# Patient Record
Sex: FEMALE | Race: WHITE | Employment: OTHER | URBAN - METROPOLITAN AREA
[De-identification: names, ages, dates, MRNs, and addresses within clinical notes are randomized per-mention and may not be internally consistent; named-entity substitution may affect disease eponyms.]

---

## 2017-03-24 ENCOUNTER — TELEPHONE (OUTPATIENT)
Dept: SURGERY | Facility: CLINIC | Age: 82
End: 2017-03-24

## 2017-03-27 NOTE — TELEPHONE ENCOUNTER
Informed pt that Dr Micheline Balbuena will not be able to refill pts pain pump,Dr Micheline Balbuena does not refill pumps he has not installed, pt understood and no further questions

## 2018-05-15 ENCOUNTER — ASST LIVING (OUTPATIENT)
Dept: FAMILY MEDICINE CLINIC | Facility: CLINIC | Age: 83
End: 2018-05-15

## 2018-05-15 VITALS
HEART RATE: 80 BPM | TEMPERATURE: 98 F | OXYGEN SATURATION: 94 % | SYSTOLIC BLOOD PRESSURE: 150 MMHG | RESPIRATION RATE: 18 BRPM | DIASTOLIC BLOOD PRESSURE: 80 MMHG

## 2018-05-15 DIAGNOSIS — G89.29 CHRONIC MIDLINE BACK PAIN, UNSPECIFIED BACK LOCATION: ICD-10-CM

## 2018-05-15 DIAGNOSIS — M54.9 CHRONIC MIDLINE BACK PAIN, UNSPECIFIED BACK LOCATION: ICD-10-CM

## 2018-05-15 DIAGNOSIS — J45.901 ACUTE EXACERBATION OF COPD WITH ASTHMA (HCC): Primary | ICD-10-CM

## 2018-05-15 DIAGNOSIS — R03.0 ELEVATED BLOOD-PRESSURE READING WITHOUT DIAGNOSIS OF HYPERTENSION: ICD-10-CM

## 2018-05-15 DIAGNOSIS — J44.1 ACUTE EXACERBATION OF COPD WITH ASTHMA (HCC): Primary | ICD-10-CM

## 2018-05-15 DIAGNOSIS — F41.9 ANXIETY: ICD-10-CM

## 2018-05-15 RX ORDER — LEVOFLOXACIN 500 MG/1
500 TABLET, FILM COATED ORAL DAILY
Qty: 10 TABLET | Refills: 0 | Status: SHIPPED | OUTPATIENT
Start: 2018-05-15 | End: 2018-05-25

## 2018-05-15 RX ORDER — BIOTIN 1 MG
1 TABLET ORAL DAILY
COMMUNITY

## 2018-05-15 RX ORDER — UBIDECARENONE 75 MG
250 CAPSULE ORAL DAILY
COMMUNITY
End: 2019-01-07

## 2018-05-15 RX ORDER — OXYBUTYNIN CHLORIDE 5 MG/1
TABLET ORAL
Refills: 2 | COMMUNITY
Start: 2018-04-08 | End: 2018-06-26

## 2018-05-15 RX ORDER — TEMAZEPAM 15 MG/1
CAPSULE ORAL
Refills: 0 | COMMUNITY
Start: 2018-03-09 | End: 2018-06-26

## 2018-05-15 RX ORDER — GABAPENTIN 600 MG/1
600 TABLET ORAL 3 TIMES DAILY
COMMUNITY
End: 2018-06-26

## 2018-05-15 RX ORDER — AMOXICILLIN 500 MG
1200 CAPSULE ORAL DAILY
Status: ON HOLD | COMMUNITY
End: 2019-01-25

## 2018-05-15 RX ORDER — PREDNISONE 20 MG/1
20 TABLET ORAL DAILY
Qty: 5 TABLET | Refills: 0 | Status: SHIPPED | OUTPATIENT
Start: 2018-05-15 | End: 2018-05-20

## 2018-05-15 RX ORDER — TRAVOPROST 0.004 %
DROPS OPHTHALMIC (EYE)
Refills: 3 | COMMUNITY
Start: 2018-05-11 | End: 2019-01-07

## 2018-05-15 RX ORDER — FERROUS SULFATE 325(65) MG
325 TABLET ORAL
COMMUNITY
End: 2019-01-01

## 2018-05-15 RX ORDER — ALBUTEROL SULFATE 90 UG/1
2 AEROSOL, METERED RESPIRATORY (INHALATION) EVERY 4 HOURS PRN
Qty: 1 INHALER | Refills: 6 | Status: SHIPPED | OUTPATIENT
Start: 2018-05-15 | End: 2019-02-22 | Stop reason: ALTCHOICE

## 2018-05-16 ENCOUNTER — TELEPHONE (OUTPATIENT)
Dept: FAMILY MEDICINE CLINIC | Facility: CLINIC | Age: 83
End: 2018-05-16

## 2018-05-16 ENCOUNTER — MED REC SCAN ONLY (OUTPATIENT)
Dept: FAMILY MEDICINE CLINIC | Facility: CLINIC | Age: 83
End: 2018-05-16

## 2018-05-16 DIAGNOSIS — J45.901 ASTHMA WITH COPD WITH EXACERBATION (HCC): Primary | ICD-10-CM

## 2018-05-16 DIAGNOSIS — J44.1 ASTHMA WITH COPD WITH EXACERBATION (HCC): Primary | ICD-10-CM

## 2018-05-16 PROBLEM — J44.9 COPD WITH ASTHMA (HCC): Status: ACTIVE | Noted: 2018-05-16

## 2018-05-16 RX ORDER — IPRATROPIUM BROMIDE AND ALBUTEROL SULFATE 2.5; .5 MG/3ML; MG/3ML
3 SOLUTION RESPIRATORY (INHALATION) EVERY 4 HOURS PRN
Qty: 50 VIAL | Refills: 0 | Status: SHIPPED | OUTPATIENT
Start: 2018-05-16 | End: 2019-01-07

## 2018-05-16 RX ORDER — ALBUTEROL SULFATE 2.5 MG/3ML
2.5 SOLUTION RESPIRATORY (INHALATION) EVERY 4 HOURS PRN
Qty: 540 ML | Refills: 3 | Status: CANCELLED | OUTPATIENT
Start: 2018-05-16

## 2018-05-16 NOTE — TELEPHONE ENCOUNTER
[facility] notified. Recommended Nasal spray and Humidifier for nasal stuffiness.

## 2018-05-16 NOTE — TELEPHONE ENCOUNTER
Yu Finnegan from [facility] called to say that they received the nebulizer machine that was ordered for this patient but not the medication to go inside, as it was not ordered according to pharmacy.  Also wants to know if pt is okay to take mucinex

## 2018-05-16 NOTE — TELEPHONE ENCOUNTER
Shiva Camilo from [facility] asked that Dr Adriana Farias place Rx for Med neb Rx to [facility], and asked if OK for Pt to take Mucinex DM at home for sinuses? Med estevan Rx pended. Will you approve, and advise of Mucinex DM authorized? Routed to Dr Adriana Farias.

## 2018-05-16 NOTE — PROGRESS NOTES
/80   Pulse 80   Temp 97.8 °F (36.6 °C) (Temporal)   Resp 18   SpO2 94%               Patient presents with:  Test Results: go over chest x-ray  Chest Congestion       HPI; Samule Burn is a 80year old female.   She is being seen here for the 325 mg by mouth daily with breakfast. Disp:  Rfl:    Docusate Sodium (STOOL SOFTENER OR) Take by mouth. Disp:  Rfl:    Probiotic Product (PROBIOTIC COLON SUPPORT OR) Take by mouth. Disp:  Rfl:    Polyethylene Glycol 3350 (MIRALAX OR) Take by mouth.  Disp: hypertension  Discussed with the home health nurse to monitor the blood pressure  Anxiety  Stable, continue the temazepam  Chronic midline back pain, unspecified back location  Unchanged, continue Dilaudid pump and OxyContin    Checking her blood work, CBC

## 2018-05-17 ENCOUNTER — TELEPHONE (OUTPATIENT)
Dept: FAMILY MEDICINE CLINIC | Facility: CLINIC | Age: 83
End: 2018-05-17

## 2018-05-17 NOTE — TELEPHONE ENCOUNTER
Initiated Prior authorization for patients Ipratropium-Albuterol  Inhalation solution . Awaiting for results .

## 2018-05-22 ENCOUNTER — ASST LIVING (OUTPATIENT)
Dept: FAMILY MEDICINE CLINIC | Facility: CLINIC | Age: 83
End: 2018-05-22

## 2018-05-22 VITALS
TEMPERATURE: 97 F | SYSTOLIC BLOOD PRESSURE: 140 MMHG | HEART RATE: 82 BPM | OXYGEN SATURATION: 96 % | RESPIRATION RATE: 18 BRPM | DIASTOLIC BLOOD PRESSURE: 68 MMHG

## 2018-05-22 DIAGNOSIS — J44.9 COPD WITH ASTHMA (HCC): ICD-10-CM

## 2018-05-22 DIAGNOSIS — R53.1 GENERALIZED WEAKNESS: ICD-10-CM

## 2018-05-22 DIAGNOSIS — J18.9 PNEUMONITIS: Primary | ICD-10-CM

## 2018-05-22 RX ORDER — PEAK FLOW METER
EACH MISCELLANEOUS
Refills: 0 | COMMUNITY
Start: 2018-05-15 | End: 2019-01-07

## 2018-05-22 RX ORDER — BRIMONIDINE TARTRATE 0.1 %
DROPS OPHTHALMIC (EYE)
Refills: 3 | COMMUNITY
Start: 2018-05-14 | End: 2018-06-26

## 2018-05-24 NOTE — PROGRESS NOTES
/68   Pulse 82   Temp 96.6 °F (35.9 °C) (Temporal)   Resp 18   SpO2 96%               Patient presents with:  RSV: follow up        HPI; Guille Hill is a 80year old female.   Patient is seen for follow-up for cough and congestion  She underw Oral Chew Tab Chew 600 mg by mouth. Disp:  Rfl:    gabapentin 600 MG Oral Tab Take 600 mg by mouth 3 (three) times daily.  Disp:  Rfl:    Ferrous Sulfate 325 (65 Fe) MG Oral Tab Take 325 mg by mouth daily with breakfast. Disp:  Rfl:    Docusate Sodium (STOO indicates understanding of these issues and agrees to the plan. Imaging & Consults:  None  Meds & Refills for this Visit:    No prescriptions requested or ordered in this encounter     No orders of the defined types were placed in this encounter.

## 2018-06-12 ENCOUNTER — ASST LIVING (OUTPATIENT)
Dept: FAMILY MEDICINE CLINIC | Facility: CLINIC | Age: 83
End: 2018-06-12

## 2018-06-12 VITALS
SYSTOLIC BLOOD PRESSURE: 108 MMHG | HEART RATE: 72 BPM | RESPIRATION RATE: 18 BRPM | TEMPERATURE: 97 F | DIASTOLIC BLOOD PRESSURE: 56 MMHG | OXYGEN SATURATION: 94 %

## 2018-06-12 DIAGNOSIS — R03.0 ELEVATED BLOOD PRESSURE READING: Primary | ICD-10-CM

## 2018-06-12 PROCEDURE — 1111F DSCHRG MED/CURRENT MED MERGE: CPT | Performed by: FAMILY MEDICINE

## 2018-06-12 NOTE — PROGRESS NOTES
/56   Pulse 72   Temp 96.8 °F (36 °C) (Temporal)   Resp 18   SpO2 94%               Patient presents with:  Hospital F/U       HPI; Dinah Castellanos is a 80year old female.  She was seen in the central to the hospital with elevated blood pressure puffs into the lungs every 4 (four) hours as needed for Wheezing. Disp: 1 Inhaler Rfl: 6   BusPIRone HCl 10 MG Oral Tab Take 1 tablet (10 mg total) by mouth 2 (two) times daily.  Disp: 60 tablet Rfl: 3   lisinopril 5 MG Oral Tab Take 1 tablet (5 mg total) b HEALTH: feels well otherwise  SKIN: denies any unusual skin lesions or rashes  RESPIRATORY: denies shortness of breath with exertion  CARDIOVASCULAR: denies chest pain on exertion  GI: denies abdominal pain and denies heartburn  NEURO: denies headaches  EX

## 2018-06-25 ENCOUNTER — HOSPITAL ENCOUNTER (EMERGENCY)
Facility: HOSPITAL | Age: 83
Discharge: HOME | End: 2018-06-25
Attending: EMERGENCY MEDICINE
Payer: MEDICARE

## 2018-06-25 VITALS
RESPIRATION RATE: 16 BRPM | WEIGHT: 130 LBS | SYSTOLIC BLOOD PRESSURE: 176 MMHG | BODY MASS INDEX: 21.66 KG/M2 | DIASTOLIC BLOOD PRESSURE: 74 MMHG | HEART RATE: 68 BPM | HEIGHT: 65 IN | TEMPERATURE: 98 F | OXYGEN SATURATION: 96 %

## 2018-06-25 DIAGNOSIS — I10 HYPERTENSION, UNSPECIFIED TYPE: Primary | ICD-10-CM

## 2018-06-25 PROCEDURE — 99284 EMERGENCY DEPT VISIT MOD MDM: CPT

## 2018-06-25 PROCEDURE — 93010 ELECTROCARDIOGRAM REPORT: CPT

## 2018-06-25 PROCEDURE — 93005 ELECTROCARDIOGRAM TRACING: CPT

## 2018-06-25 RX ORDER — CLONIDINE HYDROCHLORIDE 0.1 MG/1
0.1 TABLET ORAL 2 TIMES DAILY
Qty: 30 TABLET | Refills: 0 | Status: SHIPPED | OUTPATIENT
Start: 2018-06-25 | End: 2018-06-26

## 2018-06-25 RX ORDER — CLONIDINE HYDROCHLORIDE 0.1 MG/1
0.1 TABLET ORAL 2 TIMES DAILY
Status: COMPLETED | OUTPATIENT
Start: 2018-06-25 | End: 2018-06-25

## 2018-06-26 ENCOUNTER — ASST LIVING (OUTPATIENT)
Dept: FAMILY MEDICINE CLINIC | Facility: CLINIC | Age: 83
End: 2018-06-26

## 2018-06-26 ENCOUNTER — MOBILE ENCOUNTER (OUTPATIENT)
Dept: FAMILY MEDICINE CLINIC | Facility: CLINIC | Age: 83
End: 2018-06-26

## 2018-06-26 VITALS
TEMPERATURE: 98 F | OXYGEN SATURATION: 95 % | HEART RATE: 76 BPM | SYSTOLIC BLOOD PRESSURE: 142 MMHG | RESPIRATION RATE: 18 BRPM | DIASTOLIC BLOOD PRESSURE: 90 MMHG

## 2018-06-26 DIAGNOSIS — I10 ESSENTIAL HYPERTENSION: Primary | ICD-10-CM

## 2018-06-26 RX ORDER — GABAPENTIN 600 MG/1
TABLET ORAL
COMMUNITY
Start: 2018-06-19 | End: 2019-01-07

## 2018-06-26 RX ORDER — TEMAZEPAM 15 MG/1
CAPSULE ORAL
COMMUNITY
Start: 2018-03-09 | End: 2019-01-07

## 2018-06-26 RX ORDER — HYDROMORPHONE HYDROCHLORIDE 4 MG/1
4 TABLET ORAL 4 TIMES DAILY PRN
Status: ON HOLD | COMMUNITY
Start: 2018-08-10 | End: 2019-01-07

## 2018-06-26 RX ORDER — NALOXONE HYDROCHLORIDE 2 MG/.4ML
2 INJECTION, SOLUTION INTRAMUSCULAR; SUBCUTANEOUS
COMMUNITY
Start: 2018-06-15 | End: 2019-01-07

## 2018-06-26 RX ORDER — OXYCODONE HCL 20 MG/1
20 TABLET, FILM COATED, EXTENDED RELEASE ORAL EVERY 12 HOURS
Status: ON HOLD | COMMUNITY
Start: 2018-08-10 | End: 2019-01-25

## 2018-06-26 RX ORDER — CLONIDINE HYDROCHLORIDE 0.1 MG/1
0.1 TABLET ORAL 2 TIMES DAILY
Qty: 30 TABLET | Refills: 0 | Status: SHIPPED | OUTPATIENT
Start: 2018-06-26 | End: 2019-01-07

## 2018-06-26 RX ORDER — OXYBUTYNIN CHLORIDE 5 MG/1
TABLET ORAL
COMMUNITY
Start: 2018-04-08 | End: 2019-01-07

## 2018-06-26 NOTE — ED NOTES
Assisted Pt onto bedpan so PT could use the bathroom, Pt urinated in bedpan w/o difficulty or incident, and given clean pads

## 2018-06-26 NOTE — ED PROVIDER NOTES
Patient Seen in: [facility] Emergency Department    History   Patient presents with:  Hypertension (cardiovascular)    Stated Complaint: HTN    HPI    Patient 55-year-old woman here for evaluation for a high blood pressure read at the extended care Normocephalic and atraumatic. Eyes: Conjunctivae are normal. No scleral icterus. Neck: Normal range of motion. Neck supple. Cardiovascular: Normal rate and intact distal pulses. Pulmonary/Chest: Effort normal. No respiratory distress.    Abdominal: these medications    CloNIDine HCl 0.1 MG Oral Tab  Take 1 tablet (0.1 mg total) by mouth 2 (two) times daily.   Qty: 30 tablet Refills: 0

## 2018-06-26 NOTE — ED NOTES
Assisted Pt onto bedpan so PT could urinate. Pt did this w/o difficulty or incident.  Pt given clean pads

## 2018-06-28 NOTE — PROGRESS NOTES
/90   Pulse 76   Temp 98.3 °F (36.8 °C) (Temporal)   Resp 18   SpO2 95%               Patient presents with:  TCM (Transition Of Care Management): high blood pressure       HPI; Dustin Casper is a 80year old female.   Seen in the emergency john Cholecalciferol (VITAMIN D3) 1000 units Oral Cap Take 1 tablet by mouth daily. Disp:  Rfl:    Psyllium (METAMUCIL OR)  Disp:  Rfl:    Vitamin B-12 100 MCG Oral Tab Take 250 mcg by mouth daily.  Disp:  Rfl:    Calcium Carbonate Antacid 600 MG Oral Chew Tab chest pain on exertion  GI: denies abdominal pain and denies heartburn  NEURO: denies headaches  EXAM:   /90   Pulse 76   Temp 98.3 °F (36.8 °C) (Temporal)   Resp 18   SpO2 95%   GENERAL: well developed, well nourished,in no apparent distress  SKIN:

## 2018-12-08 NOTE — IP AVS SNAPSHOT
Patient Demographics     Address  [address] [address] Phone  [phone] [facility]  [phone] [facility]      Emergency Contact(s)     Name Relation Home Work Lizbeth Daughter [phone] Reason For Visit  LIZETH CERRATO is a(n) established patient here today for Diabetes and Dyslipidemia. A chaperone is not applicable. He is accompanied by no one.        History of Present Illness  Patient was first noted to have diabetes when he presented with some symptoms of polyuria approximate 20 years ago and was started on oral agents therapy. He did take I believe metformin and glipizide up until a BMtransplant for myeoldysplasia last year at [facility] He was up to 110 mg of prednisone twice a day. He was finally taken off steroids about 2 weeks ago. He reports being down to 10 mg a day which I think would be rather high dose to discontinue after being on it for several months she has seen Dr. Rosenbaum for many years and most recently was on a regimen of 20 units of Lantus twice a day and 10 units of Humalog with each meal plus a 1-50 sliding scale starting at 1:30. With the past few days he has had some hypoglycemia particularly after lunch. He does not eat many carbohydrates could benefit from seeing a nutritionist again at [facility]. Last saw an ophthalmologist about a year ago and was referred to a retinal specialist who told him that \"everything was OK \". No symptoms of diabetic peripheral neuropathy his most recent labs from January showed aBUN-42 and a creatinine of 1.7. Download of his blood glucose meter with mostly readings at meals   Showed a little spike after lunch, minimal after breakfast and no post dinner readings to interpret.. I'm told his most recent A1c was only 6% one performed yesterday history of type 2 diabetes in a maternal grandmother.      Review of Systems  Const: recent - 50lbs in one year weight loss, but no recent weight gain.   Eyes: no blurred vision.   ENT: no dysphagia and no hoarseness.   CV: no lower extremity edema and no dyspnea on exertion.   Resp: not expressed as feeling short of breath.   : nocturia, but no change in urinary frequency.    Endo: no polyuria and no polydipsia.   Heme/Lymph: a tendency for easy bruising.   Musc: no joint pain.   Neuro: no dizziness, no numbness, no tremors and no feeling weak.   Psych: no depression.   Skin: bruising.       Allergies  No Known Drug Allergies    Current Meds   1. Aspirin 81 MG Oral Tablet Delayed Release; TAKE 1 TABLET DAILY;   Therapy: 08May2017 to Recorded   2. HumaLOG KwikPen 100 UNIT/ML Subcutaneous Solution Pen-injector; INJECT 8 UNITS   WITH BREAKFAST, 6 UNITS WITH LUNCH, AND 8 WITH DINNER;   Therapy: 08May2017 to (Evaluate:23Mar2019)  Requested for: 28Mar2018; Last   Rx:28Mar2018 Ordered   3. Lantus SoloStar 100 UNIT/ML Subcutaneous Solution Pen-injector; INJECT 20 UNITS   TWICE A DAY;   Therapy: 08May2017 to (Evaluate:23Mar2019)  Requested for: 28Mar2018; Last   Rx:28Mar2018 Ordered   4. OneTouch Delica Lancets 33G; TEST 3 TIMES A DAY.    E11.9 TYPE 2 DIABETES ON   INSULIN;   Therapy: 22May2018 to (Evaluate:17May2019)  Requested for: 22May2018; Last   Rx:22May2018 Ordered   5. OneTouch Ultra Blue In Vitro Strip; TEST 3 TIMES DAILY;   Therapy: 14Jun2018 to (Evaluate:22Jun2019)  Requested for: 27Jun2018; Last   Rx:27Jun2018 Ordered   6. TechLite Pen Needles 32G X 4 MM; Use 4 times daily as instructed;   Therapy: 27Mar2018 to (Evaluate:29Mar2019)  Requested for: [id number]; Last   Rx:32Rva1041 Ordered   7. ValACYclovir HCl - 1 GM Oral Tablet;   Therapy: 08May2017 to Recorded    Fluconazole  Tacrolimus  Penicillin  Lantus insulin 20 units twice a day  Humalog insulin 8 units 3 times a day  Humalog sliding scale 1 unit per 50 mg/dL greater than 1:30.      Active Problems  Diabetes mellitus, type II (E11.9)  Hypercholesteremia (E78.00)  Type 2 diabetes mellitus (E11.9)    Past Medical History  History of MDS (myelodysplastic syndrome) (D46.9)  History of Uncontrolled diabetes mellitus (E11.65)  Hypertension in the past  Coronary artery disease with 2 stents  Diabetes mellitusâ€“type I.5.      Surgical  Clopidogrel Bisulfate 75 MG Tabs  Commonly known as:  PLAVIX      TAKE 1 TABLET BY MOUTH EVERY DAY   Keke Landin MD         denosumab 60 MG/ML Soln  Commonly known as:  PROLIA      Inject 60 mg into the skin every 6 (six) months.           docusate [id number] Pantoprazole Sodium (PROTONIX) EC tab 20 mg 09/17/19 0633 Given      [id number] aspirin chewable tab 81 mg 09/17/19 0917 Given      [id number] atorvastatin (LIPITOR) tab 40 mg 09/16/19 2006 Given      [id number] brimonidine Tartrate (ALPHAGAN) 0.2 History  History of Insertion Of Coronary Artery Stent    Family History  Grandmother   Family history of diabetes mellitus (Z83.3)    Social History  Minimum alcohol consumption  Never smoked tobacco (Z78.9)  Tobaccoâ€“smoked a pipe  EtOH occasionally.      Physical Exam  Constitutional: alert and in no acute distress.   Head and Face: normocephalic.   Eyes: Fundi grossly benign un dilated.   Neck: no thyroid nodules and thyroid not enlarged.   Lymphatic: no lymphadenopathy.   Pulmonary: normal respiratory rate and effort. breath sounds clear to auscultation bilaterally.   Cardiovascular: normal rate and regular rhythm . Systolic murmur. Trace peripheral edema. no right carotid bruit right dorsalis pedis 2+ no left carotid bruit left dorsalis pedis 2+   Abdomen: soft and nontender. no hepatomegaly and no splenomegaly.   Musculoskeletal: no joint swelling seen.   Neurologic: cranial nerves grossly intact. Deep tendon reflexes: 0 right ankle jerk and 0 left ankle jerk.   Skin, Hair, Nails: Bruising. no foot ulcers.   Diabetic Foot Exam:   Decreased sensation of both feet.   No ulcers observed.   Skin integrity intact.   No Calluses.   No Charcot.   No Onychomycosis.      Results/Data  3/17   A1c 6%  1/17  Glucose 168  Potassium 5.4 sodium 138  BUN 42/creatinine 1.7   (less than 245)  HDL 57   Ferritin 2213  WBC 7.2  14.1 /40.3  Platelets 144,000    RDW 15.7.      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 At one time with [id number] disease was 110 of prednisone twice a day, and was taking some 30 of Lantus twice a day and about 30 of NovoLog with each meal. Question dose of steroids was on prior to  Signed    :  Sylvester Baron MD (Physician)         [facility]  [facility] and Physical     [facility]:  [facility]    1934 MRN [id number]   Location [address] [place] [address] Attending Zeinab Schuler, discontinuation about 3 weeks ago, patient remembers 10 mg. Some fatigue symptoms, no nausea or vomiting but has had some episodes of hypoglycemia after meals continuing 20 units of Lantus twice a day, Humalog decreased 6 units 3 times a day with meals and lighting scale changed to 1-50 greater than 150. Patient to monitor blood glucoses at home and call me with results. Have asked for a C-peptide level and a cortisol level to be drawn when he goes for his blood for the transplant service next week. Cortisol may be drawn around noon time but will try to interpret results were essentially looking for very low like 2- 3 mcg/dL or less. We'll plan to follow-up in about 3-4 months, eventually we'll need to transfer care to one of my younger associates. In addition would wonder about statin therapy with his cardiovascular disease if it would be safe from the liver standpoint  Patient returns for follow-up taking a tapering course of prednisone from Dr Olivier and now down to 10 mg a day and in addition is on Plaquenil 200 mg daily for monitoring shows readings were mostly done in the morning occasionally as low as 80 on one occasion as high as 350Renal function with BUN 41 creatinine 1.6 hemoglobin hematocrit white count and platelets are normal have suggested a regimen of 20 units of Lantus once a day and 6/6/6 of NovoLog. Spoke to patient and his caretaker and suggested at least twice a day monitoring in the morning and then either predinner or at bedtime and asked him to send in blood glucose readings.  Return visit after running out of insulin and hosp with glucose >500 and ? DKA-sent out on 20 Lantus and ss Humalog 2-6 with no insulin if BG <150.  Given new Rx of 24 Lantus and 8 Humalog tid and 2-5 ss Humaloh  Pt interested in looking into Buffy continous glucose monItoring system.   Given new Rx of 24 Lantus      Signatures   Electronically signed by : LAURIE PACHECO M.D.; Nov 19 2018 10:37PM CST     Psyllium (REGULOID) 0.52 g Oral Cap Take 2 capsules by mouth daily.  Disp:  Rfl:    Pantoprazole Sodium 20 MG Oral Tab EC Take 1 tablet (20 mg total) by mouth every morning before breakfast. Disp: 90 tablet Rfl: 2   spironolactone 25 MG Oral Tab Take 1 tabl aspirin 81 MG Oral Tab Take 81 mg by mouth daily. Disp:  Rfl:    Cholecalciferol (VITAMIN D3) 1000 units Oral Cap Take 1 tablet by mouth daily. Disp:  Rfl:    Polyethylene Glycol 3350 (MIRALAX OR) Take 17 g by mouth daily.    Disp:  Rfl:        Review of Sy 4. Consider further imaging if not better, neuro consult? 2. Hyponatremia   1. Check urine osm/Na  2. IVF  3. Consider salt tabs  4. Renal consult? ?[NB.2]  3. Elevated LFTs,[NB. 1]monitor[NB.2]   4. HTN,[NB.1]  Controlled, resume home med[NB.2]  5.  COPD, s • Cancer [facility])    • Cataract    • Depression    • Essential hypertension    • Glaucoma    • Hearing impairment    • High blood pressure    • High cholesterol    • Incontinence    • Muscle weakness    • Pneumonia due to organism    • Thyroid disease    • Vi reflexes bilaterally. EOMs intact. Visual fields are full. Tongue is midline. Uvula and palate elevate symmetrically. Shrug shoulders normally bilaterally. The rest of the cranial nerves are grossly intact.   Sensation to light touch is intact bilater Toxic metabolic encephalopathy secondary to above  Baseline expressive aphasia due to prior stroke    Continue to treat hyponatremia. If mental status continues to improve, then no further neurologic work up will be indicated. Will continue to follow. • Essential hypertension    • Glaucoma    • Hearing impairment    • High blood pressure    • High cholesterol    • Incontinence    • Muscle weakness    • Pneumonia due to organism    • Thyroid disease    • Visual impairment        Past Surgical History  Pa FUNCTIONAL ABILITY STATUS  Gait Assessment   Gait Assistance: Dependent assistance(per FIM distance - otherwise mod-max A)  Distance (ft): 40  Assistive Device: Rolling walker  Pattern: Shuffle; Ataxic(retropulsion)  Stoop/Curb Assistance: Not tested  [employer] days) - after completion of MAI, Pt will most likely require 24 hour care/supervision d/t high fall risk, and currently requiring Mod A/Max A for all mobility. PLAN  PT Treatment Plan: Body mechanics; Patient education;Gait training;Strengthening;Bryant Diagnosis Date   • Back pain    • Back problem    • Cancer [facility])    • Cataract    • Depression    • Essential hypertension    • Glaucoma    • Hearing impairment    • High blood pressure    • High cholesterol    • Incontinence    • Muscle weakness    • Pneu Standardized Score (AM-PAC Scale): 35.33   CMS Modifier (G-Code): CL    FUNCTIONAL ABILITY STATUS  Gait Assessment   Gait Assistance: Dependent assistance(per FIM distance - otherwise Max A)  Distance (ft): 12  Assistive Device: Rolling walker  Pattern:  Sh PT Discharge Recommendations: Sub-acute rehabilitation(estimated LOS 12-14 days) - after completion of MAI, Pt will most likely require 24 hour care/supervision d/t high fall risk, and currently requiring Mod A/Max A for all mobility.        PLAN  PT Treatm Altered mental status, unspecified altered mental status type  Active Problems:    Essential hypertension    Hyponatremia    Altered mental status    Fall, initial encounter      Past Medical History  Past Medical History:   Diagnosis Date   • Back pain AM-PAC '6-Clicks' INPATIENT SHORT FORM - BASIC MOBILITY  How much difficulty does the patient currently have. ..  -   Turning over in bed (including adjusting bedclothes, sheets and blankets)?: A Little   -   Sitting down on and standing up from a chair wit and standing balance. Functional outcome measures completed include [facility] in which pt demonstrates with 54% degree impairment. Based on this evaluation, patient's clinical presentation is stable and overall the evaluation complexity is considered low.   [place] :  Leonie Monge OT (Occupational Therapist)       OCCUPATIONAL THERAPY EVALUATION - INPATIENT     Room Number: 426/426-A  Evaluation Date: 9/16/2019  Type of Evaluation: Initial[BJ.1]  Presenting Problem: AMS(9/14/19)[BJ.2]    Physician Order: 3. Obstructive pulmonary disease changes. XR Abdomen 9/14/19  CONCLUSION:    1. Nonobstructive bowel gas pattern  2. 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 Postoperative changes as described.       MAT JUAREZ notified of these findings with prelimin \"It just doesn't\" referring to R upper extremity    Patient self-stated goal is to go home. OBJECTIVE[BJ.1]  Precautions:  Other (Comment)(Contact MRSA, aphasia)  Fall Risk: High fall risk[BJ.2]    WEIGHT BEARING RESTRICTION[BJ.1]  Weight Bearing Res -   Bathing (including washing, rinsing, drying)?: A Lot  -   Toileting, which includes using toilet, bedpan or urinal? : A Lot  -   Putting on and taking off regular upper body clothing?: A Little  -   Taking care of personal grooming such as brushing jing the patient’s ability to participate in[BJ.1] self care ADLs, functional mobility, navigating home environment[BJ.3], instrumental activities of daily living, rest and sleep, work, leisure and social participation. [BJ.1]     Subacute rehab is recommended f Patient will perform toileting: with min assist[BJ.3]    Functional Transfer Goals[BJ.1]  Patient will transfer from sit to stand:  with min assist  Patient will transfer to toilet:  with min assist[BJ.3]    UE Exercise Program Goal  Patient will be[BJ.1]

## 2019-01-01 ENCOUNTER — MED REC SCAN ONLY (OUTPATIENT)
Dept: FAMILY MEDICINE CLINIC | Facility: CLINIC | Age: 84
End: 2019-01-01

## 2019-01-01 ENCOUNTER — EXTERNAL FACILITY (OUTPATIENT)
Dept: FAMILY MEDICINE CLINIC | Facility: CLINIC | Age: 84
End: 2019-01-01

## 2019-01-01 ENCOUNTER — TELEPHONE (OUTPATIENT)
Dept: FAMILY MEDICINE CLINIC | Facility: CLINIC | Age: 84
End: 2019-01-01

## 2019-01-01 ENCOUNTER — OFFICE VISIT (OUTPATIENT)
Dept: CARDIOLOGY | Age: 84
End: 2019-01-01

## 2019-01-01 ENCOUNTER — TELEPHONE (OUTPATIENT)
Dept: NEUROLOGY | Facility: CLINIC | Age: 84
End: 2019-01-01

## 2019-01-01 ENCOUNTER — APPOINTMENT (OUTPATIENT)
Dept: CARDIOLOGY | Age: 84
End: 2019-01-01
Attending: INTERNAL MEDICINE

## 2019-01-01 ENCOUNTER — APPOINTMENT (OUTPATIENT)
Dept: GENERAL RADIOLOGY | Facility: HOSPITAL | Age: 84
End: 2019-01-01
Attending: EMERGENCY MEDICINE
Payer: MEDICARE

## 2019-01-01 ENCOUNTER — OFFICE VISIT (OUTPATIENT)
Dept: NEUROLOGY | Facility: CLINIC | Age: 84
End: 2019-01-01
Payer: MEDICARE

## 2019-01-01 ENCOUNTER — HOSPITAL ENCOUNTER (OUTPATIENT)
Facility: HOSPITAL | Age: 84
Setting detail: OBSERVATION
Discharge: NURSING, REHAB OR LONG-TERM CARE | End: 2019-01-01
Attending: EMERGENCY MEDICINE | Admitting: INTERNAL MEDICINE
Payer: MEDICARE

## 2019-01-01 ENCOUNTER — ANCILLARY PROCEDURE (OUTPATIENT)
Dept: CARDIOLOGY | Age: 84
End: 2019-01-01
Attending: INTERNAL MEDICINE

## 2019-01-01 ENCOUNTER — APPOINTMENT (OUTPATIENT)
Dept: CARDIOLOGY | Age: 84
End: 2019-01-01

## 2019-01-01 ENCOUNTER — APPOINTMENT (OUTPATIENT)
Dept: CT IMAGING | Facility: HOSPITAL | Age: 84
End: 2019-01-01
Attending: EMERGENCY MEDICINE
Payer: MEDICARE

## 2019-01-01 ENCOUNTER — HOSPITAL ENCOUNTER (OUTPATIENT)
Dept: CARDIOLOGY CLINIC | Facility: HOSPITAL | Age: 84
Discharge: HOME | End: 2019-01-01
Attending: SURGERY
Payer: MEDICARE

## 2019-01-01 ENCOUNTER — APPOINTMENT (OUTPATIENT)
Dept: GENERAL RADIOLOGY | Facility: HOSPITAL | Age: 84
End: 2019-01-01
Attending: INTERNAL MEDICINE
Payer: MEDICARE

## 2019-01-01 ENCOUNTER — TELEPHONE (OUTPATIENT)
Dept: CARDIOLOGY | Age: 84
End: 2019-01-01

## 2019-01-01 ENCOUNTER — MOBILE ENCOUNTER (OUTPATIENT)
Dept: FAMILY MEDICINE CLINIC | Facility: CLINIC | Age: 84
End: 2019-01-01

## 2019-01-01 ENCOUNTER — HOSPITAL ENCOUNTER (OUTPATIENT)
Facility: HOSPITAL | Age: 84
Setting detail: OBSERVATION
Discharge: NURSING, REHAB OR LONG-TERM CARE | End: 2019-01-01
Attending: EMERGENCY MEDICINE | Admitting: STUDENT IN AN ORGANIZED HEALTH CARE EDUCATION/TRAINING PROGRAM
Payer: MEDICARE

## 2019-01-01 VITALS
HEIGHT: 63 IN | OXYGEN SATURATION: 95 % | TEMPERATURE: 99 F | SYSTOLIC BLOOD PRESSURE: 144 MMHG | DIASTOLIC BLOOD PRESSURE: 70 MMHG | RESPIRATION RATE: 16 BRPM | HEART RATE: 77 BPM | WEIGHT: 131 LBS | BODY MASS INDEX: 23.21 KG/M2

## 2019-01-01 VITALS
DIASTOLIC BLOOD PRESSURE: 60 MMHG | OXYGEN SATURATION: 96 % | SYSTOLIC BLOOD PRESSURE: 124 MMHG | TEMPERATURE: 97 F | RESPIRATION RATE: 16 BRPM | HEART RATE: 78 BPM

## 2019-01-01 VITALS
TEMPERATURE: 99 F | DIASTOLIC BLOOD PRESSURE: 60 MMHG | RESPIRATION RATE: 16 BRPM | SYSTOLIC BLOOD PRESSURE: 120 MMHG | OXYGEN SATURATION: 96 % | HEART RATE: 62 BPM

## 2019-01-01 VITALS
HEART RATE: 80 BPM | OXYGEN SATURATION: 96 % | RESPIRATION RATE: 18 BRPM | TEMPERATURE: 99 F | BODY MASS INDEX: 24 KG/M2 | SYSTOLIC BLOOD PRESSURE: 112 MMHG | DIASTOLIC BLOOD PRESSURE: 62 MMHG | WEIGHT: 135 LBS

## 2019-01-01 VITALS
HEART RATE: 76 BPM | BODY MASS INDEX: 23 KG/M2 | RESPIRATION RATE: 18 BRPM | TEMPERATURE: 99 F | DIASTOLIC BLOOD PRESSURE: 72 MMHG | SYSTOLIC BLOOD PRESSURE: 142 MMHG | WEIGHT: 138 LBS | OXYGEN SATURATION: 96 %

## 2019-01-01 VITALS
SYSTOLIC BLOOD PRESSURE: 144 MMHG | DIASTOLIC BLOOD PRESSURE: 72 MMHG | OXYGEN SATURATION: 94 % | TEMPERATURE: 98 F | BODY MASS INDEX: 22 KG/M2 | HEART RATE: 62 BPM | RESPIRATION RATE: 18 BRPM | WEIGHT: 134 LBS

## 2019-01-01 VITALS
DIASTOLIC BLOOD PRESSURE: 83 MMHG | RESPIRATION RATE: 18 BRPM | SYSTOLIC BLOOD PRESSURE: 126 MMHG | BODY MASS INDEX: 20.49 KG/M2 | WEIGHT: 123 LBS | TEMPERATURE: 98 F | HEIGHT: 65 IN | OXYGEN SATURATION: 98 % | HEART RATE: 72 BPM

## 2019-01-01 VITALS
WEIGHT: 131 LBS | DIASTOLIC BLOOD PRESSURE: 52 MMHG | SYSTOLIC BLOOD PRESSURE: 96 MMHG | HEIGHT: 65 IN | HEART RATE: 62 BPM | BODY MASS INDEX: 21.83 KG/M2

## 2019-01-01 VITALS
DIASTOLIC BLOOD PRESSURE: 48 MMHG | RESPIRATION RATE: 18 BRPM | SYSTOLIC BLOOD PRESSURE: 138 MMHG | HEART RATE: 67 BPM | TEMPERATURE: 99 F | WEIGHT: 133 LBS | BODY MASS INDEX: 24.48 KG/M2 | OXYGEN SATURATION: 91 % | HEIGHT: 62 IN

## 2019-01-01 VITALS
HEART RATE: 78 BPM | TEMPERATURE: 98 F | SYSTOLIC BLOOD PRESSURE: 108 MMHG | RESPIRATION RATE: 18 BRPM | DIASTOLIC BLOOD PRESSURE: 58 MMHG | OXYGEN SATURATION: 92 %

## 2019-01-01 VITALS
RESPIRATION RATE: 18 BRPM | HEART RATE: 72 BPM | DIASTOLIC BLOOD PRESSURE: 62 MMHG | BODY MASS INDEX: 22 KG/M2 | WEIGHT: 132 LBS | SYSTOLIC BLOOD PRESSURE: 140 MMHG | OXYGEN SATURATION: 96 % | TEMPERATURE: 98 F

## 2019-01-01 VITALS
SYSTOLIC BLOOD PRESSURE: 118 MMHG | WEIGHT: 131 LBS | HEART RATE: 70 BPM | DIASTOLIC BLOOD PRESSURE: 60 MMHG | TEMPERATURE: 97 F | BODY MASS INDEX: 24 KG/M2

## 2019-01-01 VITALS
HEART RATE: 68 BPM | BODY MASS INDEX: 22 KG/M2 | SYSTOLIC BLOOD PRESSURE: 118 MMHG | DIASTOLIC BLOOD PRESSURE: 68 MMHG | WEIGHT: 125 LBS | RESPIRATION RATE: 14 BRPM

## 2019-01-01 VITALS
DIASTOLIC BLOOD PRESSURE: 62 MMHG | TEMPERATURE: 99 F | RESPIRATION RATE: 18 BRPM | HEART RATE: 62 BPM | BODY MASS INDEX: 21 KG/M2 | OXYGEN SATURATION: 93 % | SYSTOLIC BLOOD PRESSURE: 130 MMHG | WEIGHT: 126 LBS

## 2019-01-01 VITALS
DIASTOLIC BLOOD PRESSURE: 58 MMHG | RESPIRATION RATE: 16 BRPM | WEIGHT: 130 LBS | TEMPERATURE: 99 F | HEART RATE: 84 BPM | SYSTOLIC BLOOD PRESSURE: 108 MMHG | OXYGEN SATURATION: 96 % | BODY MASS INDEX: 22 KG/M2

## 2019-01-01 VITALS — DIASTOLIC BLOOD PRESSURE: 54 MMHG | RESPIRATION RATE: 16 BRPM | SYSTOLIC BLOOD PRESSURE: 92 MMHG | HEART RATE: 80 BPM

## 2019-01-01 VITALS
RESPIRATION RATE: 18 BRPM | OXYGEN SATURATION: 97 % | TEMPERATURE: 98 F | DIASTOLIC BLOOD PRESSURE: 54 MMHG | HEART RATE: 62 BPM | SYSTOLIC BLOOD PRESSURE: 100 MMHG

## 2019-01-01 VITALS
SYSTOLIC BLOOD PRESSURE: 126 MMHG | HEART RATE: 60 BPM | BODY MASS INDEX: 25 KG/M2 | RESPIRATION RATE: 18 BRPM | TEMPERATURE: 98 F | DIASTOLIC BLOOD PRESSURE: 72 MMHG | WEIGHT: 140 LBS

## 2019-01-01 DIAGNOSIS — R47.01 EXPRESSIVE APHASIA: ICD-10-CM

## 2019-01-01 DIAGNOSIS — Z86.73 HISTORY OF STROKE: ICD-10-CM

## 2019-01-01 DIAGNOSIS — I95.9 HYPOTENSION, UNSPECIFIED HYPOTENSION TYPE: Primary | ICD-10-CM

## 2019-01-01 DIAGNOSIS — R41.82 ALTERED MENTAL STATUS, UNSPECIFIED ALTERED MENTAL STATUS TYPE: Primary | ICD-10-CM

## 2019-01-01 DIAGNOSIS — I95.9 HYPOTENSION, UNSPECIFIED HYPOTENSION TYPE: ICD-10-CM

## 2019-01-01 DIAGNOSIS — E87.1 HYPONATREMIA: Primary | ICD-10-CM

## 2019-01-01 DIAGNOSIS — I63.9 LEFT-SIDED CEREBROVASCULAR ACCIDENT (CVA) (CMD): Primary | ICD-10-CM

## 2019-01-01 DIAGNOSIS — I10 ESSENTIAL HYPERTENSION: ICD-10-CM

## 2019-01-01 DIAGNOSIS — R60.0 PEDAL EDEMA: Primary | ICD-10-CM

## 2019-01-01 DIAGNOSIS — E87.1 HYPONATREMIA: ICD-10-CM

## 2019-01-01 DIAGNOSIS — I95.1 ORTHOSTATIC HYPOTENSION: ICD-10-CM

## 2019-01-01 DIAGNOSIS — I67.89 ISCHEMIC ENCEPHALOPATHY: Primary | ICD-10-CM

## 2019-01-01 DIAGNOSIS — N30.00 ACUTE CYSTITIS WITHOUT HEMATURIA: Primary | ICD-10-CM

## 2019-01-01 DIAGNOSIS — R74.8 ELEVATED LIVER ENZYMES: ICD-10-CM

## 2019-01-01 DIAGNOSIS — R47.01 EXPRESSIVE APHASIA: Primary | ICD-10-CM

## 2019-01-01 DIAGNOSIS — I63.9 LEFT-SIDED CEREBROVASCULAR ACCIDENT (CVA) (HCC): ICD-10-CM

## 2019-01-01 DIAGNOSIS — I69.30 CHRONIC ISCHEMIC LEFT MCA STROKE: Primary | ICD-10-CM

## 2019-01-01 DIAGNOSIS — R35.0 URINARY FREQUENCY: Primary | ICD-10-CM

## 2019-01-01 DIAGNOSIS — I44.30 AV BLOCK: Primary | ICD-10-CM

## 2019-01-01 DIAGNOSIS — I63.9 LEFT-SIDED CEREBROVASCULAR ACCIDENT (CVA) (CMD): ICD-10-CM

## 2019-01-01 DIAGNOSIS — R29.6 FREQUENT FALLS: ICD-10-CM

## 2019-01-01 DIAGNOSIS — N30.00 ACUTE CYSTITIS WITHOUT HEMATURIA: ICD-10-CM

## 2019-01-01 DIAGNOSIS — N39.0 URINARY TRACT INFECTION WITHOUT HEMATURIA, SITE UNSPECIFIED: Primary | ICD-10-CM
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
## 2019-01-01 DIAGNOSIS — I87.2 VENOUS INSUFFICIENCY OF BOTH LOWER EXTREMITIES: ICD-10-CM

## 2019-01-01 DIAGNOSIS — J44.9 COPD WITH ASTHMA (HCC): ICD-10-CM

## 2019-01-01 DIAGNOSIS — R47.01 APHASIA DUE TO ACUTE CEREBROVASCULAR ACCIDENT (CVA) (HCC): ICD-10-CM

## 2019-01-01 DIAGNOSIS — R79.89 ABNORMAL LFTS: ICD-10-CM

## 2019-01-01 DIAGNOSIS — N39.46 MIXED STRESS AND URGE URINARY INCONTINENCE: Primary | ICD-10-CM

## 2019-01-01 DIAGNOSIS — D64.9 ANEMIA, UNSPECIFIED TYPE: ICD-10-CM

## 2019-01-01 DIAGNOSIS — G47.00 INSOMNIA, UNSPECIFIED TYPE: ICD-10-CM

## 2019-01-01 DIAGNOSIS — Z95.0 PACEMAKER: ICD-10-CM

## 2019-01-01 DIAGNOSIS — I10 ESSENTIAL HYPERTENSION: Primary | ICD-10-CM

## 2019-01-01 DIAGNOSIS — K59.00 CONSTIPATION, UNSPECIFIED CONSTIPATION TYPE: Primary | ICD-10-CM

## 2019-01-01 DIAGNOSIS — Z45.018 PACEMAKER REPROGRAMMING/CHECK: ICD-10-CM

## 2019-01-01 DIAGNOSIS — I63.9 APHASIA DUE TO ACUTE CEREBROVASCULAR ACCIDENT (CVA) (HCC): ICD-10-CM

## 2019-01-01 LAB
ALBUMIN SERPL-MCNC: 2.5 G/DL (ref 3.4–5)
ALBUMIN SERPL-MCNC: 2.5 G/DL (ref 3.4–5)
ALBUMIN SERPL-MCNC: 3.2 G/DL (ref 3.4–5)
ALBUMIN/GLOB SERPL: 0.6 {RATIO} (ref 1–2)
ALBUMIN/GLOB SERPL: 0.6 {RATIO} (ref 1–2)
ALP LIVER SERPL-CCNC: 184 U/L (ref 55–142)
ALP LIVER SERPL-CCNC: 215 U/L (ref 55–142)
ALP LIVER SERPL-CCNC: 309 U/L (ref 55–142)
ALT SERPL-CCNC: 114 U/L (ref 13–56)
ALT SERPL-CCNC: 145 U/L (ref 13–56)
ALT SERPL-CCNC: 205 U/L (ref 13–56)
ANION GAP SERPL CALC-SCNC: 3 MMOL/L (ref 0–18)
ANION GAP SERPL CALC-SCNC: 5 MMOL/L (ref 0–18)
ANION GAP SERPL CALC-SCNC: 7 MMOL/L (ref 0–18)
AST SERPL-CCNC: 154 U/L (ref 15–37)
AST SERPL-CCNC: 62 U/L (ref 15–37)
AST SERPL-CCNC: 96 U/L (ref 15–37)
ATRIAL RATE: 78 BPM
BASOPHILS # BLD AUTO: 0.1 X10(3) UL (ref 0–0.2)
BASOPHILS # BLD AUTO: 0.12 X10(3) UL (ref 0–0.2)
BASOPHILS # BLD AUTO: 0.13 X10(3) UL (ref 0–0.2)
BASOPHILS NFR BLD AUTO: 0.7 %
BASOPHILS NFR BLD AUTO: 0.8 %
BASOPHILS NFR BLD AUTO: 0.9 %
BILIRUB DIRECT SERPL-MCNC: 0.2 MG/DL (ref 0–0.2)
BILIRUB SERPL-MCNC: 0.5 MG/DL (ref 0.1–2)
BILIRUB SERPL-MCNC: 0.5 MG/DL (ref 0.1–2)
BILIRUB SERPL-MCNC: 0.6 MG/DL (ref 0.1–2)
BILIRUB UR QL STRIP.AUTO: NEGATIVE
BUN BLD-MCNC: 11 MG/DL (ref 7–18)
BUN BLD-MCNC: 15 MG/DL (ref 7–18)
BUN BLD-MCNC: 9 MG/DL (ref 7–18)
BUN/CREAT SERPL: 13.2 (ref 10–20)
BUN/CREAT SERPL: 13.9 (ref 10–20)
BUN/CREAT SERPL: 14.3 (ref 10–20)
CALCIUM BLD-MCNC: 8.7 MG/DL (ref 8.5–10.1)
CALCIUM BLD-MCNC: 8.7 MG/DL (ref 8.5–10.1)
CALCIUM BLD-MCNC: 9.2 MG/DL (ref 8.5–10.1)
CHLORIDE SERPL-SCNC: 101 MMOL/L (ref 98–112)
CHLORIDE SERPL-SCNC: 102 MMOL/L (ref 98–112)
CHLORIDE SERPL-SCNC: 93 MMOL/L (ref 98–112)
CO2 SERPL-SCNC: 27 MMOL/L (ref 21–32)
CO2 SERPL-SCNC: 27 MMOL/L (ref 21–32)
CO2 SERPL-SCNC: 29 MMOL/L (ref 21–32)
CREAT BLD-MCNC: 0.68 MG/DL (ref 0.55–1.02)
CREAT BLD-MCNC: 0.79 MG/DL (ref 0.55–1.02)
CREAT BLD-MCNC: 1.05 MG/DL (ref 0.55–1.02)
DEPRECATED RDW RBC AUTO: 44.9 FL (ref 35.1–46.3)
DEPRECATED RDW RBC AUTO: 44.9 FL (ref 35.1–46.3)
DEPRECATED RDW RBC AUTO: 45.7 FL (ref 35.1–46.3)
EOSINOPHIL # BLD AUTO: 0.28 X10(3) UL (ref 0–0.7)
EOSINOPHIL # BLD AUTO: 0.28 X10(3) UL (ref 0–0.7)
EOSINOPHIL # BLD AUTO: 0.31 X10(3) UL (ref 0–0.7)
EOSINOPHIL NFR BLD AUTO: 1.7 %
EOSINOPHIL NFR BLD AUTO: 2.1 %
EOSINOPHIL NFR BLD AUTO: 2.2 %
ERYTHROCYTE [DISTWIDTH] IN BLOOD BY AUTOMATED COUNT: 13.6 % (ref 11–15)
ERYTHROCYTE [DISTWIDTH] IN BLOOD BY AUTOMATED COUNT: 13.7 % (ref 11–15)
ERYTHROCYTE [DISTWIDTH] IN BLOOD BY AUTOMATED COUNT: 13.7 % (ref 11–15)
GLOBULIN PLAS-MCNC: 4 G/DL (ref 2.8–4.4)
GLOBULIN PLAS-MCNC: 5 G/DL (ref 2.8–4.4)
GLUCOSE BLD-MCNC: 123 MG/DL (ref 70–99)
GLUCOSE BLD-MCNC: 90 MG/DL (ref 70–99)
GLUCOSE BLD-MCNC: 95 MG/DL (ref 70–99)
GLUCOSE UR STRIP.AUTO-MCNC: NEGATIVE MG/DL
HCT VFR BLD AUTO: 32 % (ref 35–48)
HCT VFR BLD AUTO: 32 % (ref 35–48)
HCT VFR BLD AUTO: 35.1 % (ref 35–48)
HGB BLD-MCNC: 10.8 G/DL (ref 12–16)
HGB BLD-MCNC: 10.9 G/DL (ref 12–16)
HGB BLD-MCNC: 11.9 G/DL (ref 12–16)
IMM GRANULOCYTES # BLD AUTO: 0.3 X10(3) UL (ref 0–1)
IMM GRANULOCYTES # BLD AUTO: 0.32 X10(3) UL (ref 0–1)
IMM GRANULOCYTES # BLD AUTO: 0.34 X10(3) UL (ref 0–1)
IMM GRANULOCYTES NFR BLD: 1.9 %
IMM GRANULOCYTES NFR BLD: 2.3 %
IMM GRANULOCYTES NFR BLD: 2.4 %
KETONES UR STRIP.AUTO-MCNC: NEGATIVE MG/DL
LYMPHOCYTES # BLD AUTO: 1.68 X10(3) UL (ref 1–4)
LYMPHOCYTES # BLD AUTO: 1.7 X10(3) UL (ref 1–4)
LYMPHOCYTES # BLD AUTO: 1.99 X10(3) UL (ref 1–4)
LYMPHOCYTES NFR BLD AUTO: 10.1 %
LYMPHOCYTES NFR BLD AUTO: 13.1 %
LYMPHOCYTES NFR BLD AUTO: 13.8 %
M PROTEIN MFR SERPL ELPH: 6.4 G/DL (ref 6.4–8.2)
M PROTEIN MFR SERPL ELPH: 6.5 G/DL (ref 6.4–8.2)
M PROTEIN MFR SERPL ELPH: 8.2 G/DL (ref 6.4–8.2)
MCH RBC QN AUTO: 30.1 PG (ref 26–34)
MCH RBC QN AUTO: 30.2 PG (ref 26–34)
MCH RBC QN AUTO: 30.4 PG (ref 26–34)
MCHC RBC AUTO-ENTMCNC: 33.8 G/DL (ref 31–37)
MCHC RBC AUTO-ENTMCNC: 33.9 G/DL (ref 31–37)
MCHC RBC AUTO-ENTMCNC: 34.1 G/DL (ref 31–37)
MCV RBC AUTO: 88.6 FL (ref 80–100)
MCV RBC AUTO: 88.9 FL (ref 80–100)
MCV RBC AUTO: 90.1 FL (ref 80–100)
MONOCYTES # BLD AUTO: 1.58 X10(3) UL (ref 0.1–1)
MONOCYTES # BLD AUTO: 1.89 X10(3) UL (ref 0.1–1)
MONOCYTES # BLD AUTO: 2.21 X10(3) UL (ref 0.1–1)
MONOCYTES NFR BLD AUTO: 12.3 %
MONOCYTES NFR BLD AUTO: 13.1 %
MONOCYTES NFR BLD AUTO: 13.1 %
NEUTROPHILS # BLD AUTO: 12.19 X10 (3) UL (ref 1.5–7.7)
NEUTROPHILS # BLD AUTO: 12.19 X10(3) UL (ref 1.5–7.7)
NEUTROPHILS # BLD AUTO: 8.84 X10 (3) UL (ref 1.5–7.7)
NEUTROPHILS # BLD AUTO: 8.84 X10(3) UL (ref 1.5–7.7)
NEUTROPHILS # BLD AUTO: 9.82 X10 (3) UL (ref 1.5–7.7)
NEUTROPHILS # BLD AUTO: 9.82 X10(3) UL (ref 1.5–7.7)
NEUTROPHILS NFR BLD AUTO: 67.9 %
NEUTROPHILS NFR BLD AUTO: 69.2 %
NEUTROPHILS NFR BLD AUTO: 72.4 %
NITRITE UR QL STRIP.AUTO: NEGATIVE
NT-PROBNP SERPL-MCNC: 568 PG/ML (ref ?–450)
OSMOLALITY SERPL CALC.SUM OF ELEC: 266 MOSM/KG (ref 275–295)
OSMOLALITY SERPL CALC.SUM OF ELEC: 275 MOSM/KG (ref 275–295)
OSMOLALITY SERPL CALC.SUM OF ELEC: 276 MOSM/KG (ref 275–295)
P AXIS: 67 DEGREES
P-R INTERVAL: 196 MS
PH UR STRIP.AUTO: 6 [PH] (ref 4.5–8)
PLATELET # BLD AUTO: 396 10(3)UL (ref 150–450)
PLATELET # BLD AUTO: 399 10(3)UL (ref 150–450)
PLATELET # BLD AUTO: 423 10(3)UL (ref 150–450)
POTASSIUM SERPL-SCNC: 4.8 MMOL/L (ref 3.5–5.1)
POTASSIUM SERPL-SCNC: 4.8 MMOL/L (ref 3.5–5.1)
POTASSIUM SERPL-SCNC: 5 MMOL/L (ref 3.5–5.1)
PROT UR STRIP.AUTO-MCNC: NEGATIVE MG/DL
Q-T INTERVAL: 370 MS
QRS DURATION: 100 MS
QTC CALCULATION (BEZET): 421 MS
R AXIS: -17 DEGREES
RBC # BLD AUTO: 3.55 X10(6)UL (ref 3.8–5.3)
RBC # BLD AUTO: 3.61 X10(6)UL (ref 3.8–5.3)
RBC # BLD AUTO: 3.95 X10(6)UL (ref 3.8–5.3)
SODIUM SERPL-SCNC: 127 MMOL/L (ref 136–145)
SODIUM SERPL-SCNC: 133 MMOL/L (ref 136–145)
SODIUM SERPL-SCNC: 134 MMOL/L (ref 136–145)
SP GR UR STRIP.AUTO: 1.01 (ref 1–1.03)
T AXIS: -12 DEGREES
TROPONIN I SERPL-MCNC: <0.045 NG/ML (ref ?–0.04)
UROBILINOGEN UR STRIP.AUTO-MCNC: <2 MG/DL
VENTRICULAR RATE: 78 BPM
WBC # BLD AUTO: 12.8 X10(3) UL (ref 4–11)
WBC # BLD AUTO: 14.5 X10(3) UL (ref 4–11)
WBC # BLD AUTO: 16.8 X10(3) UL (ref 4–11)

## 2019-01-01 PROCEDURE — 99225 SUBSEQUENT OBSERVATION CARE: CPT | Performed by: INTERNAL MEDICINE

## 2019-01-01 PROCEDURE — 99213 OFFICE O/P EST LOW 20 MIN: CPT | Performed by: INTERNAL MEDICINE

## 2019-01-01 PROCEDURE — 99217 OBSERVATION CARE DISCHARGE: CPT | Performed by: HOSPITALIST

## 2019-01-01 PROCEDURE — 93280 PM DEVICE PROGR EVAL DUAL: CPT | Performed by: INTERNAL MEDICINE

## 2019-01-01 PROCEDURE — 72125 CT NECK SPINE W/O DYE: CPT | Performed by: EMERGENCY MEDICINE

## 2019-01-01 PROCEDURE — 99219 INITIAL OBSERVATION CARE,LEVL II: CPT | Performed by: STUDENT IN AN ORGANIZED HEALTH CARE EDUCATION/TRAINING PROGRAM

## 2019-01-01 PROCEDURE — 71045 X-RAY EXAM CHEST 1 VIEW: CPT | Performed by: EMERGENCY MEDICINE

## 2019-01-01 PROCEDURE — 99214 OFFICE O/P EST MOD 30 MIN: CPT | Performed by: INTERNAL MEDICINE

## 2019-01-01 PROCEDURE — 99214 OFFICE O/P EST MOD 30 MIN: CPT | Performed by: FAMILY MEDICINE

## 2019-01-01 PROCEDURE — 99217 OBSERVATION CARE DISCHARGE: CPT | Performed by: INTERNAL MEDICINE

## 2019-01-01 PROCEDURE — 74018 RADEX ABDOMEN 1 VIEW: CPT | Performed by: INTERNAL MEDICINE

## 2019-01-01 PROCEDURE — 99213 OFFICE O/P EST LOW 20 MIN: CPT | Performed by: OTHER

## 2019-01-01 PROCEDURE — 99495 TRANSJ CARE MGMT MOD F2F 14D: CPT | Performed by: FAMILY MEDICINE

## 2019-01-01 PROCEDURE — 99214 OFFICE O/P EST MOD 30 MIN: CPT | Performed by: OTHER

## 2019-01-01 PROCEDURE — 99220 INITIAL OBSERVATION CARE,LEVL III: CPT | Performed by: INTERNAL MEDICINE

## 2019-01-01 PROCEDURE — 70450 CT HEAD/BRAIN W/O DYE: CPT | Performed by: EMERGENCY MEDICINE

## 2019-01-01 PROCEDURE — 99226 SUBSEQUENT OBSERVATION CARE: CPT | Performed by: HOSPITALIST

## 2019-01-01 PROCEDURE — 93880 EXTRACRANIAL BILAT STUDY: CPT | Performed by: INTERNAL MEDICINE

## 2019-01-01 RX ORDER — POLYETHYLENE GLYCOL 3350 17 G/17G
17 POWDER, FOR SOLUTION ORAL DAILY
Status: DISCONTINUED | OUTPATIENT
Start: 2019-01-01 | End: 2019-01-01

## 2019-01-01 RX ORDER — OXYBUTYNIN CHLORIDE 5 MG/1
TABLET ORAL
Qty: 30 TABLET | Refills: 5 | Status: SHIPPED | OUTPATIENT
Start: 2019-01-01 | End: 2019-01-01

## 2019-01-01 RX ORDER — CLOPIDOGREL BISULFATE 75 MG/1
TABLET ORAL
COMMUNITY
Start: 2019-01-01 | End: 2019-01-01

## 2019-01-01 RX ORDER — ATORVASTATIN CALCIUM 40 MG/1
40 TABLET, FILM COATED ORAL NIGHTLY
Qty: 90 TABLET | Refills: 0 | OUTPATIENT
Start: 2019-01-01

## 2019-01-01 RX ORDER — ASPIRIN 81 MG/1
81 TABLET, CHEWABLE ORAL DAILY
Status: DISCONTINUED | OUTPATIENT
Start: 2019-01-01 | End: 2019-01-01

## 2019-01-01 RX ORDER — SPIRONOLACTONE 25 MG/1
TABLET ORAL
Qty: 10 TABLET | Refills: 0 | Status: SHIPPED | OUTPATIENT
Start: 2019-01-01 | End: 2020-01-01

## 2019-01-01 RX ORDER — PANTOPRAZOLE SODIUM 20 MG/1
20 TABLET, DELAYED RELEASE ORAL
Status: DISCONTINUED | OUTPATIENT
Start: 2019-01-01 | End: 2019-01-01

## 2019-01-01 RX ORDER — ATORVASTATIN CALCIUM 40 MG/1
40 TABLET, FILM COATED ORAL NIGHTLY
Status: CANCELLED | OUTPATIENT
Start: 2019-01-01

## 2019-01-01 RX ORDER — OXYBUTYNIN CHLORIDE 5 MG/1
5 TABLET ORAL NIGHTLY
Status: DISCONTINUED | OUTPATIENT
Start: 2019-01-01 | End: 2019-01-01

## 2019-01-01 RX ORDER — DOCUSATE SODIUM 100 MG/1
100 CAPSULE, LIQUID FILLED ORAL
Status: DISCONTINUED | OUTPATIENT
Start: 2019-01-01 | End: 2019-01-01

## 2019-01-01 RX ORDER — SODIUM CHLORIDE 9 MG/ML
INJECTION, SOLUTION INTRAVENOUS CONTINUOUS
Status: DISCONTINUED | OUTPATIENT
Start: 2019-01-01 | End: 2019-01-01

## 2019-01-01 RX ORDER — SACCHAROMYCES BOULARDII 250 MG
250 CAPSULE ORAL 2 TIMES DAILY
Qty: 60 CAPSULE | Refills: 0 | Status: CANCELLED | OUTPATIENT
Start: 2019-01-01

## 2019-01-01 RX ORDER — LISINOPRIL 10 MG/1
TABLET ORAL
Qty: 90 TABLET | Refills: 0 | Status: SHIPPED | OUTPATIENT
Start: 2019-01-01 | End: 2019-01-01

## 2019-01-01 RX ORDER — ASPIRIN 81 MG/1
TABLET, COATED ORAL
Refills: 1 | COMMUNITY
Start: 2019-01-01 | End: 2019-01-01

## 2019-01-01 RX ORDER — ALBUTEROL SULFATE 90 UG/1
2 AEROSOL, METERED RESPIRATORY (INHALATION)
COMMUNITY
End: 2019-01-01

## 2019-01-01 RX ORDER — MIDODRINE HYDROCHLORIDE 2.5 MG/1
2.5 TABLET ORAL
Qty: 30 TABLET | Refills: 0 | Status: SHIPPED | OUTPATIENT
Start: 2019-01-01

## 2019-01-01 RX ORDER — SACCHAROMYCES BOULARDII 250 MG
250 CAPSULE ORAL 2 TIMES DAILY
Qty: 28 CAPSULE | Refills: 0 | Status: SHIPPED | OUTPATIENT
Start: 2019-01-01 | End: 2019-01-01

## 2019-01-01 RX ORDER — SPIRONOLACTONE 25 MG/1
25 TABLET ORAL DAILY
Qty: 30 TABLET | Refills: 1 | Status: SHIPPED | OUTPATIENT
Start: 2019-01-01 | End: 2019-01-01

## 2019-01-01 RX ORDER — GABAPENTIN 300 MG/1
CAPSULE ORAL
Qty: 60 CAPSULE | Refills: 0 | Status: SHIPPED | OUTPATIENT
Start: 2019-01-01 | End: 2019-01-01

## 2019-01-01 RX ORDER — HEPARIN SODIUM 5000 [USP'U]/ML
5000 INJECTION, SOLUTION INTRAVENOUS; SUBCUTANEOUS EVERY 12 HOURS SCHEDULED
Status: DISCONTINUED | OUTPATIENT
Start: 2019-01-01 | End: 2019-01-01

## 2019-01-01 RX ORDER — CLOPIDOGREL BISULFATE 75 MG/1
75 TABLET ORAL
Status: DISCONTINUED | OUTPATIENT
Start: 2019-01-01 | End: 2019-01-01

## 2019-01-01 RX ORDER — BRIMONIDINE TARTRATE 1 MG/ML
1 SOLUTION/ DROPS OPHTHALMIC
COMMUNITY

## 2019-01-01 RX ORDER — LATANOPROST 50 UG/ML
1 SOLUTION/ DROPS OPHTHALMIC NIGHTLY
Status: DISCONTINUED | OUTPATIENT
Start: 2019-01-01 | End: 2019-01-01

## 2019-01-01 RX ORDER — GABAPENTIN 300 MG/1
300 CAPSULE ORAL 2 TIMES DAILY
Status: DISCONTINUED | OUTPATIENT
Start: 2019-01-01 | End: 2019-01-01

## 2019-01-01 RX ORDER — GABAPENTIN 300 MG/1
300 CAPSULE ORAL 2 TIMES DAILY
Qty: 60 CAPSULE | Refills: 3 | Status: SHIPPED | OUTPATIENT
Start: 2019-01-01 | End: 2020-01-01

## 2019-01-01 RX ORDER — MIDODRINE HYDROCHLORIDE 2.5 MG/1
2.5 TABLET ORAL
Qty: 30 TABLET | Refills: 0 | Status: SHIPPED | OUTPATIENT
Start: 2019-01-01 | End: 2019-01-01

## 2019-01-01 RX ORDER — LISINOPRIL 10 MG/1
TABLET ORAL
Qty: 30 TABLET | Refills: 0 | Status: SHIPPED | OUTPATIENT
Start: 2019-01-01 | End: 2019-01-01

## 2019-01-01 RX ORDER — METOCLOPRAMIDE HYDROCHLORIDE 5 MG/ML
5 INJECTION INTRAMUSCULAR; INTRAVENOUS EVERY 8 HOURS PRN
Status: DISCONTINUED | OUTPATIENT
Start: 2019-01-01 | End: 2019-01-01

## 2019-01-01 RX ORDER — SODIUM CHLORIDE 1000 MG
1 TABLET, SOLUBLE MISCELLANEOUS ONCE
Qty: 1 TABLET | Refills: 0 | Status: SHIPPED | OUTPATIENT
Start: 2019-01-01 | End: 2019-01-01

## 2019-01-01 RX ORDER — FERROUS SULFATE 325(65) MG
325 TABLET ORAL
Qty: 90 TABLET | Refills: 0 | Status: CANCELLED | OUTPATIENT
Start: 2019-01-01

## 2019-01-01 RX ORDER — CIPROFLOXACIN 500 MG/1
500 TABLET, FILM COATED ORAL 2 TIMES DAILY
Qty: 14 TABLET | Refills: 0 | Status: ON HOLD | OUTPATIENT
Start: 2019-01-01 | End: 2019-01-01 | Stop reason: ALTCHOICE

## 2019-01-01 RX ORDER — ONDANSETRON 2 MG/ML
4 INJECTION INTRAMUSCULAR; INTRAVENOUS EVERY 6 HOURS PRN
Status: DISCONTINUED | OUTPATIENT
Start: 2019-01-01 | End: 2019-01-01

## 2019-01-01 RX ORDER — OXYBUTYNIN CHLORIDE 5 MG/1
5 TABLET ORAL EVERY EVENING
Status: DISCONTINUED | OUTPATIENT
Start: 2019-01-01 | End: 2019-01-01

## 2019-01-01 RX ORDER — LISINOPRIL 10 MG/1
10 TABLET ORAL
Status: DISCONTINUED | OUTPATIENT
Start: 2019-01-01 | End: 2019-01-01

## 2019-01-01 RX ORDER — ATORVASTATIN CALCIUM 40 MG/1
40 TABLET, FILM COATED ORAL NIGHTLY
Qty: 90 TABLET | Refills: 0 | Status: SHIPPED | OUTPATIENT
Start: 2019-01-01 | End: 2019-01-01

## 2019-01-01 RX ORDER — MELATONIN
325
Status: DISCONTINUED | OUTPATIENT
Start: 2019-01-01 | End: 2019-01-01

## 2019-01-01 RX ORDER — GARLIC EXTRACT 500 MG
1 CAPSULE ORAL 2 TIMES DAILY
Status: DISCONTINUED | OUTPATIENT
Start: 2019-01-01 | End: 2019-01-01

## 2019-01-01 RX ORDER — LISINOPRIL 5 MG/1
5 TABLET ORAL 2 TIMES DAILY
Qty: 60 TABLET | Refills: 2 | Status: SHIPPED | OUTPATIENT
Start: 2019-01-01

## 2019-01-01 RX ORDER — LISINOPRIL 5 MG/1
2.5 TABLET ORAL
COMMUNITY
Start: 2019-01-01

## 2019-01-01 RX ORDER — OXYBUTYNIN CHLORIDE 5 MG/1
TABLET ORAL
Qty: 30 TABLET | Refills: 0 | Status: SHIPPED | OUTPATIENT
Start: 2019-01-01 | End: 2019-01-01

## 2019-01-01 RX ORDER — ATORVASTATIN CALCIUM 40 MG/1
40 TABLET, FILM COATED ORAL
COMMUNITY
Start: 2019-01-01

## 2019-01-01 RX ORDER — LISINOPRIL 10 MG/1
TABLET ORAL
Qty: 30 TABLET | Refills: 0 | Status: CANCELLED | OUTPATIENT
Start: 2019-01-01

## 2019-01-01 RX ORDER — PANTOPRAZOLE SODIUM 20 MG/1
20 TABLET, DELAYED RELEASE ORAL
COMMUNITY
Start: 2019-01-01

## 2019-01-01 RX ORDER — CLOPIDOGREL BISULFATE 75 MG/1
TABLET ORAL
Qty: 30 TABLET | Refills: 0 | Status: SHIPPED | OUTPATIENT
Start: 2019-01-01 | End: 2019-01-01

## 2019-01-01 RX ORDER — ALBUTEROL SULFATE 90 UG/1
1 AEROSOL, METERED RESPIRATORY (INHALATION) EVERY 6 HOURS PRN
Status: DISCONTINUED | OUTPATIENT
Start: 2019-01-01 | End: 2019-01-01

## 2019-01-01 RX ORDER — OMEPRAZOLE 20 MG/1
20 CAPSULE, DELAYED RELEASE ORAL
Qty: 90 CAPSULE | Refills: 3 | Status: SHIPPED | OUTPATIENT
Start: 2019-01-01 | End: 2019-01-01

## 2019-01-01 RX ORDER — TRAVOPROST OPHTHALMIC SOLUTION 0.04 MG/ML
1 SOLUTION OPHTHALMIC
COMMUNITY
End: 2019-01-01

## 2019-01-01 RX ORDER — SOLIFENACIN SUCCINATE 5 MG/1
5 TABLET, FILM COATED ORAL DAILY
Qty: 30 TABLET | Refills: 3 | Status: SHIPPED | OUTPATIENT
Start: 2019-01-01

## 2019-01-01 RX ORDER — B-COMPLEX WITH VITAMIN C
1 TABLET ORAL DAILY
Qty: 90 TABLET | Refills: 3 | Status: SHIPPED | OUTPATIENT
Start: 2019-01-01

## 2019-01-01 RX ORDER — BRIMONIDINE TARTRATE 2 MG/ML
1 SOLUTION/ DROPS OPHTHALMIC 2 TIMES DAILY
Status: DISCONTINUED | OUTPATIENT
Start: 2019-01-01 | End: 2019-01-01

## 2019-01-01 RX ORDER — SPIRONOLACTONE 25 MG/1
25 TABLET ORAL DAILY
Qty: 30 TABLET | Refills: 1 | Status: ON HOLD | OUTPATIENT
Start: 2019-01-01 | End: 2019-01-01

## 2019-01-01 RX ORDER — DOCUSATE SODIUM 100 MG/1
100 CAPSULE, LIQUID FILLED ORAL
COMMUNITY

## 2019-01-01 RX ORDER — MAG HYDROX/ALUMINUM HYD/SIMETH 400-400-40
SUSPENSION, ORAL (FINAL DOSE FORM) ORAL
Refills: 5 | Status: ON HOLD | COMMUNITY
Start: 2019-01-01 | End: 2019-01-01

## 2019-01-01 RX ORDER — FERROUS SULFATE 325(65) MG
325 TABLET ORAL
Qty: 90 TABLET | Refills: 0 | Status: ON HOLD | OUTPATIENT
Start: 2019-01-01 | End: 2019-01-01

## 2019-01-01 RX ORDER — SODIUM CHLORIDE 1000 MG
1 TABLET, SOLUBLE MISCELLANEOUS
Qty: 30 TABLET | Refills: 3 | Status: SHIPPED | OUTPATIENT
Start: 2019-01-01 | End: 2019-01-01 | Stop reason: DRUGHIGH

## 2019-01-01 RX ORDER — MULTIVITAMIN WITH IRON
TABLET ORAL
Refills: 5 | COMMUNITY
Start: 2019-01-01 | End: 2020-01-01

## 2019-01-01 RX ORDER — TRAZODONE HYDROCHLORIDE 50 MG/1
50 TABLET ORAL NIGHTLY
Qty: 20 TABLET | Refills: 0 | Status: SHIPPED | OUTPATIENT
Start: 2019-01-01

## 2019-01-01 RX ORDER — GABAPENTIN 300 MG/1
CAPSULE ORAL
COMMUNITY
Start: 2019-01-01

## 2019-01-01 RX ORDER — GABAPENTIN 300 MG/1
300 CAPSULE ORAL 2 TIMES DAILY
Qty: 60 CAPSULE | Refills: 3 | Status: SHIPPED | OUTPATIENT
Start: 2019-01-01 | End: 2019-01-01

## 2019-01-01 RX ORDER — SODIUM CHLORIDE 9 MG/ML
1000 INJECTION, SOLUTION INTRAVENOUS ONCE
Status: COMPLETED | OUTPATIENT
Start: 2019-01-01 | End: 2019-01-01

## 2019-01-01 RX ORDER — B-COMPLEX WITH VITAMIN C
1 TABLET ORAL
COMMUNITY
Start: 2019-01-01 | End: 2019-01-01

## 2019-01-01 RX ORDER — LISINOPRIL 5 MG/1
5 TABLET ORAL 2 TIMES DAILY
Qty: 60 TABLET | Refills: 0 | Status: SHIPPED | OUTPATIENT
Start: 2019-01-01 | End: 2019-01-01

## 2019-01-01 RX ORDER — ATORVASTATIN CALCIUM 40 MG/1
40 TABLET, FILM COATED ORAL NIGHTLY
Status: DISCONTINUED | OUTPATIENT
Start: 2019-01-01 | End: 2019-01-01

## 2019-01-01 RX ORDER — GABAPENTIN 300 MG/1
CAPSULE ORAL
Qty: 60 CAPSULE | Refills: 3 | Status: SHIPPED | OUTPATIENT
Start: 2019-01-01 | End: 2019-01-01

## 2019-01-01 RX ORDER — ENOXAPARIN SODIUM 100 MG/ML
40 INJECTION SUBCUTANEOUS DAILY
Status: DISCONTINUED | OUTPATIENT
Start: 2019-01-01 | End: 2019-01-01

## 2019-01-01 RX ORDER — SODIUM CHLORIDE 1000 MG
TABLET, SOLUBLE MISCELLANEOUS
Refills: 0 | COMMUNITY
Start: 2019-01-01

## 2019-01-01 RX ORDER — ACETAMINOPHEN 500 MG
500 TABLET ORAL EVERY 8 HOURS PRN
Status: DISCONTINUED | OUTPATIENT
Start: 2019-01-01 | End: 2019-01-01

## 2019-01-01 RX ORDER — SPIRONOLACTONE 25 MG/1
25 TABLET ORAL DAILY
Status: DISCONTINUED | OUTPATIENT
Start: 2019-01-01 | End: 2019-01-01

## 2019-01-01 RX ORDER — SULFAMETHOXAZOLE AND TRIMETHOPRIM 800; 160 MG/1; MG/1
1 TABLET ORAL 2 TIMES DAILY
Qty: 14 TABLET | Refills: 0 | Status: SHIPPED | OUTPATIENT
Start: 2019-01-01 | End: 2019-01-01

## 2019-01-01 RX ORDER — SACCHAROMYCES BOULARDII 250 MG
250 CAPSULE ORAL 2 TIMES DAILY
Qty: 180 CAPSULE | Refills: 0 | Status: ON HOLD | OUTPATIENT
Start: 2019-01-01 | End: 2019-01-01 | Stop reason: ALTCHOICE

## 2019-01-01 RX ORDER — CLOPIDOGREL BISULFATE 75 MG/1
TABLET ORAL
Qty: 90 TABLET | Refills: 3 | Status: SHIPPED | OUTPATIENT
Start: 2019-01-01

## 2019-01-01 RX ORDER — ACETAMINOPHEN 500 MG
1 TABLET ORAL DAILY
Qty: 30 TABLET | Refills: 5 | Status: SHIPPED | OUTPATIENT
Start: 2019-01-01 | End: 2019-01-01

## 2019-01-01 SDOH — HEALTH STABILITY: MENTAL HEALTH: HOW OFTEN DO YOU HAVE A DRINK CONTAINING ALCOHOL?: NEVER

## 2019-01-01 ASSESSMENT — ENCOUNTER SYMPTOMS
HEMOPTYSIS: 0
WEIGHT LOSS: 0
CHILLS: 0
WEIGHT GAIN: 0
ALLERGIC/IMMUNOLOGIC COMMENTS: NO NEW FOOD ALLERGIES
SUSPICIOUS LESIONS: 0
HEMATOCHEZIA: 0
FEVER: 0
BRUISES/BLEEDS EASILY: 0
COUGH: 0

## 2019-01-07 ENCOUNTER — APPOINTMENT (OUTPATIENT)
Dept: CV DIAGNOSTICS | Facility: HOSPITAL | Age: 84
DRG: 244 | End: 2019-01-07
Attending: INTERNAL MEDICINE
Payer: MEDICARE

## 2019-01-07 ENCOUNTER — APPOINTMENT (OUTPATIENT)
Dept: INTERVENTIONAL RADIOLOGY/VASCULAR | Facility: HOSPITAL | Age: 84
DRG: 244 | End: 2019-01-07
Attending: INTERNAL MEDICINE
Payer: MEDICARE

## 2019-01-07 ENCOUNTER — HOSPITAL ENCOUNTER (INPATIENT)
Facility: HOSPITAL | Age: 84
LOS: 1 days | Discharge: HOME HEALTH | DRG: 244 | End: 2019-01-09
Attending: EMERGENCY MEDICINE | Admitting: INTERNAL MEDICINE
Payer: MEDICARE

## 2019-01-07 ENCOUNTER — APPOINTMENT (OUTPATIENT)
Dept: GENERAL RADIOLOGY | Facility: HOSPITAL | Age: 84
DRG: 244 | End: 2019-01-07
Attending: INTERNAL MEDICINE
Payer: MEDICARE

## 2019-01-07 ENCOUNTER — APPOINTMENT (OUTPATIENT)
Dept: GENERAL RADIOLOGY | Facility: HOSPITAL | Age: 84
DRG: 244 | End: 2019-01-07
Attending: EMERGENCY MEDICINE
Payer: MEDICARE

## 2019-01-07 ENCOUNTER — APPOINTMENT (OUTPATIENT)
Dept: CT IMAGING | Facility: HOSPITAL | Age: 84
DRG: 244 | End: 2019-01-07
Attending: EMERGENCY MEDICINE
Payer: MEDICARE

## 2019-01-07 DIAGNOSIS — S01.01XA LACERATION OF SCALP, INITIAL ENCOUNTER: ICD-10-CM

## 2019-01-07 DIAGNOSIS — R55 SYNCOPE AND COLLAPSE: Primary | ICD-10-CM

## 2019-01-07 DIAGNOSIS — D64.9 ANEMIA, UNSPECIFIED TYPE: ICD-10-CM

## 2019-01-07 DIAGNOSIS — Z23 TETANUS-DIPHTHERIA (TD) VACCINATION: ICD-10-CM

## 2019-01-07 PROBLEM — R79.89 AZOTEMIA: Status: ACTIVE | Noted: 2019-01-07

## 2019-01-07 PROBLEM — R73.9 HYPERGLYCEMIA: Status: ACTIVE | Noted: 2019-01-07

## 2019-01-07 PROCEDURE — 70450 CT HEAD/BRAIN W/O DYE: CPT | Performed by: EMERGENCY MEDICINE

## 2019-01-07 PROCEDURE — 71045 X-RAY EXAM CHEST 1 VIEW: CPT | Performed by: INTERNAL MEDICINE

## 2019-01-07 PROCEDURE — 02H63JZ INSERTION OF PACEMAKER LEAD INTO RIGHT ATRIUM, PERCUTANEOUS APPROACH: ICD-10-PCS | Performed by: INTERNAL MEDICINE

## 2019-01-07 PROCEDURE — 71045 X-RAY EXAM CHEST 1 VIEW: CPT | Performed by: EMERGENCY MEDICINE

## 2019-01-07 PROCEDURE — 93306 TTE W/DOPPLER COMPLETE: CPT | Performed by: INTERNAL MEDICINE

## 2019-01-07 PROCEDURE — 0JH606Z INSERTION OF PACEMAKER, DUAL CHAMBER INTO CHEST SUBCUTANEOUS TISSUE AND FASCIA, OPEN APPROACH: ICD-10-PCS | Performed by: INTERNAL MEDICINE

## 2019-01-07 PROCEDURE — 02HK3JZ INSERTION OF PACEMAKER LEAD INTO RIGHT VENTRICLE, PERCUTANEOUS APPROACH: ICD-10-PCS | Performed by: INTERNAL MEDICINE

## 2019-01-07 PROCEDURE — 99223 1ST HOSP IP/OBS HIGH 75: CPT | Performed by: INTERNAL MEDICINE

## 2019-01-07 PROCEDURE — B5171ZZ FLUOROSCOPY OF LEFT SUBCLAVIAN VEIN USING LOW OSMOLAR CONTRAST: ICD-10-PCS | Performed by: INTERNAL MEDICINE

## 2019-01-07 RX ORDER — GABAPENTIN 600 MG/1
600 TABLET ORAL 3 TIMES DAILY
Status: ON HOLD | COMMUNITY
End: 2019-01-25

## 2019-01-07 RX ORDER — ASPIRIN 81 MG/1
81 TABLET, CHEWABLE ORAL DAILY
Status: DISCONTINUED | OUTPATIENT
Start: 2019-01-08 | End: 2019-01-09

## 2019-01-07 RX ORDER — MELATONIN
325
Status: DISCONTINUED | OUTPATIENT
Start: 2019-01-08 | End: 2019-01-09

## 2019-01-07 RX ORDER — LATANOPROST 50 UG/ML
1 SOLUTION/ DROPS OPHTHALMIC NIGHTLY
Status: DISCONTINUED | OUTPATIENT
Start: 2019-01-07 | End: 2019-01-09

## 2019-01-07 RX ORDER — TEMAZEPAM 7.5 MG/1
7.5 CAPSULE ORAL NIGHTLY PRN
Refills: 1 | COMMUNITY
Start: 2018-10-17 | End: 2019-01-07

## 2019-01-07 RX ORDER — SODIUM CHLORIDE 9 MG/ML
INJECTION, SOLUTION INTRAVENOUS CONTINUOUS
Status: DISCONTINUED | OUTPATIENT
Start: 2019-01-07 | End: 2019-01-07

## 2019-01-07 RX ORDER — ONDANSETRON 2 MG/ML
4 INJECTION INTRAMUSCULAR; INTRAVENOUS EVERY 6 HOURS PRN
Status: DISCONTINUED | OUTPATIENT
Start: 2019-01-07 | End: 2019-01-09

## 2019-01-07 RX ORDER — TEMAZEPAM 7.5 MG/1
7.5 CAPSULE ORAL NIGHTLY PRN
Status: ON HOLD | COMMUNITY
End: 2019-01-25

## 2019-01-07 RX ORDER — LIDOCAINE HYDROCHLORIDE 10 MG/ML
INJECTION, SOLUTION EPIDURAL; INFILTRATION; INTRACAUDAL; PERINEURAL
Status: COMPLETED
Start: 2019-01-07 | End: 2019-01-07

## 2019-01-07 RX ORDER — CHLORHEXIDINE GLUCONATE 4 G/100ML
30 SOLUTION TOPICAL
Status: DISCONTINUED | OUTPATIENT
Start: 2019-01-08 | End: 2019-01-07

## 2019-01-07 RX ORDER — CEFAZOLIN SODIUM/WATER 2 G/20 ML
SYRINGE (ML) INTRAVENOUS
Status: COMPLETED
Start: 2019-01-07 | End: 2019-01-07

## 2019-01-07 RX ORDER — TEMAZEPAM 15 MG/1
15 CAPSULE ORAL NIGHTLY PRN
Status: DISCONTINUED | OUTPATIENT
Start: 2019-01-07 | End: 2019-01-09

## 2019-01-07 RX ORDER — BACITRACIN 50000 [USP'U]/1
INJECTION, POWDER, LYOPHILIZED, FOR SOLUTION INTRAMUSCULAR
Status: COMPLETED
Start: 2019-01-07 | End: 2019-01-07

## 2019-01-07 RX ORDER — DIAZEPAM 2 MG/1
1 TABLET ORAL 2 TIMES DAILY PRN
Status: ON HOLD | COMMUNITY
End: 2019-01-07

## 2019-01-07 RX ORDER — SODIUM CHLORIDE 9 MG/ML
INJECTION, SOLUTION INTRAVENOUS CONTINUOUS
Status: DISCONTINUED | OUTPATIENT
Start: 2019-01-07 | End: 2019-01-09

## 2019-01-07 RX ORDER — CEFAZOLIN SODIUM/WATER 2 G/20 ML
2 SYRINGE (ML) INTRAVENOUS EVERY 12 HOURS
Status: COMPLETED | OUTPATIENT
Start: 2019-01-08 | End: 2019-01-08

## 2019-01-07 RX ORDER — NETARSUDIL 0.2 MG/ML
1 SOLUTION/ DROPS OPHTHALMIC; TOPICAL NIGHTLY
Refills: 6 | COMMUNITY
Start: 2019-01-03 | End: 2019-01-07

## 2019-01-07 RX ORDER — OXYCODONE HCL 20 MG/1
20 TABLET, FILM COATED, EXTENDED RELEASE ORAL EVERY 24 HOURS
Status: DISCONTINUED | OUTPATIENT
Start: 2019-01-08 | End: 2019-01-09

## 2019-01-07 RX ORDER — ALBUTEROL SULFATE 90 UG/1
2 AEROSOL, METERED RESPIRATORY (INHALATION) EVERY 4 HOURS PRN
Status: DISCONTINUED | OUTPATIENT
Start: 2019-01-07 | End: 2019-01-09

## 2019-01-07 RX ORDER — TRAVOPROST OPHTHALMIC SOLUTION 0.04 MG/ML
1 SOLUTION OPHTHALMIC NIGHTLY
Status: ON HOLD | COMMUNITY
End: 2019-01-14 | Stop reason: SDUPTHER

## 2019-01-07 RX ORDER — CLONIDINE HYDROCHLORIDE 0.1 MG/1
0.1 TABLET ORAL 2 TIMES DAILY
Status: DISCONTINUED | OUTPATIENT
Start: 2019-01-08 | End: 2019-01-08

## 2019-01-07 RX ORDER — LISINOPRIL 2.5 MG/1
2.5 TABLET ORAL DAILY
Refills: 0 | Status: ON HOLD | COMMUNITY
Start: 2018-12-26 | End: 2019-01-09

## 2019-01-07 RX ORDER — TETANUS AND DIPHTHERIA TOXOIDS ADSORBED 2; 2 [LF]/.5ML; [LF]/.5ML
0.5 INJECTION INTRAMUSCULAR ONCE
Status: COMPLETED | OUTPATIENT
Start: 2019-01-07 | End: 2019-01-07

## 2019-01-07 RX ORDER — BRIMONIDINE TARTRATE 2 MG/ML
1 SOLUTION/ DROPS OPHTHALMIC EVERY 8 HOURS SCHEDULED
Status: DISCONTINUED | OUTPATIENT
Start: 2019-01-07 | End: 2019-01-08

## 2019-01-07 RX ORDER — HYDRALAZINE HYDROCHLORIDE 20 MG/ML
10 INJECTION INTRAMUSCULAR; INTRAVENOUS EVERY 4 HOURS PRN
Status: DISCONTINUED | OUTPATIENT
Start: 2019-01-07 | End: 2019-01-09

## 2019-01-07 RX ORDER — MIDAZOLAM HYDROCHLORIDE 1 MG/ML
INJECTION INTRAMUSCULAR; INTRAVENOUS
Status: COMPLETED
Start: 2019-01-07 | End: 2019-01-07

## 2019-01-07 RX ORDER — DOCUSATE SODIUM 100 MG/1
100 CAPSULE, LIQUID FILLED ORAL
COMMUNITY

## 2019-01-07 RX ORDER — GABAPENTIN 600 MG/1
600 TABLET ORAL 3 TIMES DAILY
Status: DISCONTINUED | OUTPATIENT
Start: 2019-01-07 | End: 2019-01-09

## 2019-01-07 RX ORDER — LISINOPRIL 2.5 MG/1
2.5 TABLET ORAL DAILY
Status: DISCONTINUED | OUTPATIENT
Start: 2019-01-08 | End: 2019-01-08

## 2019-01-07 RX ORDER — IPRATROPIUM BROMIDE AND ALBUTEROL SULFATE 2.5; .5 MG/3ML; MG/3ML
3 SOLUTION RESPIRATORY (INHALATION) EVERY 4 HOURS PRN
Status: DISCONTINUED | OUTPATIENT
Start: 2019-01-07 | End: 2019-01-09

## 2019-01-07 RX ORDER — BISACODYL 10 MG
10 SUPPOSITORY, RECTAL RECTAL
Status: DISCONTINUED | OUTPATIENT
Start: 2019-01-07 | End: 2019-01-09

## 2019-01-07 RX ORDER — POLYETHYLENE GLYCOL 3350 17 G/17G
17 POWDER, FOR SOLUTION ORAL DAILY PRN
Status: DISCONTINUED | OUTPATIENT
Start: 2019-01-07 | End: 2019-01-09

## 2019-01-07 RX ORDER — CEFAZOLIN SODIUM/WATER 2 G/20 ML
2 SYRINGE (ML) INTRAVENOUS
Status: DISCONTINUED | OUTPATIENT
Start: 2019-01-07 | End: 2019-01-07 | Stop reason: ALTCHOICE

## 2019-01-07 RX ORDER — ENOXAPARIN SODIUM 100 MG/ML
40 INJECTION SUBCUTANEOUS NIGHTLY
Status: DISCONTINUED | OUTPATIENT
Start: 2019-01-07 | End: 2019-01-09

## 2019-01-07 NOTE — ED NOTES
Cardiologist at the bedside, patient to stay here in ED for stat ECHO and then to get pace maker. Notified RN on the floor regarding update.

## 2019-01-07 NOTE — H&P
[facility]OSPITAL[facility]  History and Physical     Sedalia Ranjan Villanueva Patient Status:  Emergency    1934 MRN [id number]   Location [facility] Attending Maryan Lindsey, [address] [place] [address] Day # 0 PCP Elijah Boast     Chief Complaint Carbonate-Vit D-Min (CALCIUM 1200 OR) Take 1,200 mg by mouth daily. Disp:  Rfl:    diazepam 2 MG Oral Tab Take 1 mg by mouth 2 (two) times daily as needed for Anxiety.  Disp:  Rfl:    docusate sodium 100 MG Oral Cap Take 100 mg by mouth daily as needed for General: No acute distress. Alert and oriented x 3. HEENT: Normocephalic atraumatic. Moist mucous membranes. EOM-I. PERRLA. Anicteric. Neck: No lymphadenopathy. Respiratory: Clear to auscultation bilaterally. No wheezes. No rhonchi.   Cardiovascular:

## 2019-01-07 NOTE — ED NOTES
Patient returned to room after having gone to the bathroom, patient reported to be feeling very sleepy all of a sudden. Noted to be bradycardic on the monitor. Confirmed patient's HR was in the 30s.  MD and team to bedside, patient placed on pads, atropine

## 2019-01-07 NOTE — ED INITIAL ASSESSMENT (HPI)
Patient arrives by ems following fall off toilet from independent living facility. Reports she woke up on ground in bathroom.  spoke with patient and saw bleeding to back of head. Patient denies head pain, dizziness, vision changes.  Denies c

## 2019-01-07 NOTE — CONSULTS
[facility] Cardiology  Report of Consultation    Mayur Villanueva Patient Status:  Emergency    1934 MRN [id number]   Location [address] [facility] Attending Zehra Zuluaga, [address] [place] [address] Day # 0 PCP Serge Ya     Reason for Consu in no apparent distress. HEENT: Superficial right occipital contusion with dried blood. Neck: No JVD, carotids 2+, loud right bruit. Cardiac: Regular rate and rhythm, S1, S2 normal, no murmur, rub or gallop.   Lungs: Clear without wheezes, rales, rhonchi

## 2019-01-07 NOTE — ED NOTES
Patient's daughter Moody Muñoz was called and updated regarding patient's status as per patient's request. Per daughter, she will be coming to the hospital later this evening.

## 2019-01-07 NOTE — CONSULTS
[facility] Initial Consult Note - EP consult      Ruel Villanueva Patient Status:  Emergency    1934 MRN [id number]   Location [facility] [facility] Attending Deisy Casanova, [address] [place] [address] Day # 0 PCP MERCEDES NORMAN fatigued  Neuro: A+O x 3  HEENT: right carotid bruit  Cardiac: RRR  Lungs: CTAB  Abdomen: Soft, NT, ND, BS+  Extremities: Warm, well perfused, normal cap refill, no edema  Skin: Warm and dry.        Labs:     Recent Labs   Lab  01/07/19   1209   GLU  119*

## 2019-01-07 NOTE — ED PROVIDER NOTES
Patient Seen in: [facility] Emergency Department    History   Patient presents with:  Trauma 1 & 2 (cardiovascular, musculoskeletal)    Stated Complaint: fall, head injury, possible LOC    HPI    79 yo fm medical history of hypertension, depression, Temp src Temporal   SpO2 95 %   O2 Device None (Room air)       Current:/71   Pulse 62   Temp 98.5 °F (36.9 °C) (Temporal)   Resp 16   Ht 165.1 cm (5' 5\")   Wt 63 kg   SpO2 96%   BMI 23.13 kg/m²         Physical Exam   Constitutional: She is orien - Normal   PROTHROMBIN TIME (PT) - Normal   PTT, ACTIVATED - Normal   CBC WITH DIFFERENTIAL WITH PLATELET    Narrative: The following orders were created for panel order CBC WITH DIFFERENTIAL WITH PLATELET.   Procedure                               Abno right silvano thorax.  Surgical clips over the right hilum suggest previous thoracotomy.  Correlate with clinical history.              Dictated by: Kvng Garcia MD on 1/07/2019 at 13:29       Approved by: Kvng Garcia MD                      Narr palpation step-offs or deformities. She has no musculoskeletal complaints at this time. Her EKG is sinus with no gross ST changes significant for ischemia she does have a right bundle branch block seen in her previous EKG from 6/25/2018.   Troponin is neg

## 2019-01-08 ENCOUNTER — APPOINTMENT (OUTPATIENT)
Dept: ULTRASOUND IMAGING | Facility: HOSPITAL | Age: 84
DRG: 244 | End: 2019-01-08
Attending: INTERNAL MEDICINE
Payer: MEDICARE

## 2019-01-08 ENCOUNTER — APPOINTMENT (OUTPATIENT)
Dept: GENERAL RADIOLOGY | Facility: HOSPITAL | Age: 84
DRG: 244 | End: 2019-01-08
Attending: INTERNAL MEDICINE
Payer: MEDICARE

## 2019-01-08 PROBLEM — I44.2 COMPLETE HEART BLOCK (HCC): Status: ACTIVE | Noted: 2019-01-08

## 2019-01-08 PROCEDURE — 4B02XSZ MEASUREMENT OF CARDIAC PACEMAKER, EXTERNAL APPROACH: ICD-10-PCS | Performed by: INTERNAL MEDICINE

## 2019-01-08 PROCEDURE — 71046 X-RAY EXAM CHEST 2 VIEWS: CPT | Performed by: INTERNAL MEDICINE

## 2019-01-08 PROCEDURE — 99232 SBSQ HOSP IP/OBS MODERATE 35: CPT | Performed by: INTERNAL MEDICINE

## 2019-01-08 PROCEDURE — 93880 EXTRACRANIAL BILAT STUDY: CPT | Performed by: INTERNAL MEDICINE

## 2019-01-08 RX ORDER — BRIMONIDINE TARTRATE 2 MG/ML
1 SOLUTION/ DROPS OPHTHALMIC 2 TIMES DAILY
Status: DISCONTINUED | OUTPATIENT
Start: 2019-01-08 | End: 2019-01-09

## 2019-01-08 RX ORDER — LISINOPRIL 5 MG/1
5 TABLET ORAL DAILY
Status: DISCONTINUED | OUTPATIENT
Start: 2019-01-09 | End: 2019-01-09

## 2019-01-08 NOTE — PROGRESS NOTES
[facility]  Cardiology Progress Note    Subjective:  No chest pain or shortness of breath. Feels good.       Objective:  /58 (BP Location: Right arm)   Pulse 67   Temp 98.6 °F (37 °C) (Oral)   Resp 20   Ht 5' 5\" (1.651 m)   Wt 136 lb 7.4 oz (61 favor PT/OT evaluation given limitations with left upper extremity post-pacemaker for next 30 days. Possible discharge later today pending evaluations and treatment plan.   - Increase to Lisinopril 5mg PO daily  -  to see - would benefit fro

## 2019-01-08 NOTE — PHYSICAL THERAPY NOTE
PHYSICAL THERAPY QUICK EVALUATION - INPATIENT    Room Number: 5985/9812-X  Evaluation Date: 1/8/2019  Presenting Problem: syncope, s/p PPM implant 1/7/19  Physician Order: PT Eval and Treat    Problem List  Principal Problem:    Syncope and collapse  Act functional limits     Lower extremity strength is within functional limits     NEUROLOGICAL FINDINGS                      ACTIVITY TOLERANCE                         O2 WALK                  AM-PAC '6-Clicks' INPATIENT SHORT FORM - BASIC MOBILITY  How much precautions and verbalized understanding. Based on this evaluation, patient's clinical presentation is stable and overall evaluation complexity is considered low.    PT Discharge Recommendations: Home    PLAN  Patient has been evaluated and presents with no

## 2019-01-08 NOTE — PROGRESS NOTES
Pain Service  Consulted to interrogate Intrathecal pump:  Morphine 25mg/ml concentration  11.995mg/day continuous rate  Low reservoir alarm date 2/10/19    Patient's Pain MD Dr. Shen [place] [phone]    Plan for

## 2019-01-08 NOTE — OCCUPATIONAL THERAPY NOTE
OCCUPATIONAL THERAPY QUICK EVALUATION - INPATIENT    Room Number: 5856/8877-H  Evaluation Date: 1/8/2019     Type of Evaluation: Initial  Presenting Problem: syncope, pacemaker placement    Physician Order: IP Consult to Occupational Therapy  Reason for Th we're supposed to do? \"    Patient self-stated goal is to find her necklace, lost at [facility]. OBJECTIVE  Precautions: Limb alert - left; Other (Comment)(LUE pacemaker precautions)  Fall Risk: Standard fall risk    WEIGHT BEARING RESTRICTION  Weight Bearing Re avoiding L shoulder flexion. Able to correct and identify alternate strategy. Able to don/doff sling with cueing for proper fit. Pt discovers loss of necklace during dressing task, as she thought it was in her shoe.  Pt very upset RE necklace; assisted pt t

## 2019-01-08 NOTE — CM/SW NOTE
01/08/19 1400   CM/SW Screening   Referral [phone] staff; Chart review;Nursing rounds   Patient's Mental Status Alert;Oriented   Patient's Home Environment [facility]  [place])   Patient Status Pr

## 2019-01-08 NOTE — OPERATIVE REPORT
ELECTROPHYSIOLOGY SERVICE OP REPORT    PROCEDURE: dual chamber PPM implant    OPERATION PERFORMED:    1. Implantation of [employer] dual chamber PPM, serial number [id number] at the left infraclavicular site. 2. Implantation of transvenous leads:   At fluoroscopic guidance. A total of 2 wires were placed. RIGHT VENTRICULAR LEAD:  A 7 Lithuanian peel away sheath was brought to the field and placed  into the venous system via over the wire technique.  The right ventricular lead was placed  via this sheath i applied to the incision followed by a Telfa and Tegaderm  Dressing.     MEASURED DEVICE DATA:  Atrial lead  sensin.8 mV  impedance: 545 Ohms  Threshold: 1.2 Volts at 0.5 ms    RV lead  sensin.1 mV  pacing impedance: 950 Ohms  Threshold: 0.8 Volts at

## 2019-01-08 NOTE — PLAN OF CARE
Patient/Family Goals    • Patient/Family Long Term Goal Progressing    • Patient/Family Short Term Goal Progressing        Patient is alert and oriented x4. NSR/A paced on tele. Oxygenation is 100% on RA. L sounds clear. VSS.  Patient denies chest pain, cony

## 2019-01-08 NOTE — PROGRESS NOTES
ANN HOSPITALIST  Progress Note     Remona Schlatter Warschkow Patient Status:  Observation    1934 MRN [id number]   [facility] 2NE-A Attending Danilo Gamez MD   Hosp Day # 0 PCP Santana Jaffe     Chief Complaint: s/p PPM     S: Patient with Both Eyes Nightly   • enoxaparin  40 mg Subcutaneous Nightly   • ceFAZolin  2 g Intravenous 2 times per day       ASSESSMENT / PLAN:     1. Syncope   1. Due to CHB, s./p PPM  2. CTH neg for acute bleed  3. Cardiology rec appreciated   2.  Bradycardia with 3

## 2019-01-08 NOTE — PROGRESS NOTES
[facility] Pharmacy Note:  Renal Adjustment for cefazolin (ANCEF)    Bina Lyles is a 80year old female who has been prescribed cefazolin (ANCEF) 2g q12hr x2 doses.     Est CrCl: ~33 mL/min    The dose has been adjusted to cefazolin (ANCEF) 2g q12hr per hos

## 2019-01-09 VITALS
HEART RATE: 72 BPM | HEIGHT: 65 IN | WEIGHT: 136.44 LBS | DIASTOLIC BLOOD PRESSURE: 59 MMHG | TEMPERATURE: 98 F | RESPIRATION RATE: 18 BRPM | BODY MASS INDEX: 22.73 KG/M2 | SYSTOLIC BLOOD PRESSURE: 136 MMHG | OXYGEN SATURATION: 96 %

## 2019-01-09 PROCEDURE — 99239 HOSP IP/OBS DSCHRG MGMT >30: CPT | Performed by: HOSPITALIST

## 2019-01-09 RX ORDER — LISINOPRIL 5 MG/1
5 TABLET ORAL DAILY
Qty: 30 TABLET | Refills: 3 | Status: ON HOLD | OUTPATIENT
Start: 2019-01-10 | End: 2019-01-26

## 2019-01-09 RX ORDER — BRIMONIDINE TARTRATE 2 MG/ML
1 SOLUTION/ DROPS OPHTHALMIC 2 TIMES DAILY
Qty: 1 BOTTLE | Refills: 1 | Status: SHIPPED | OUTPATIENT
Start: 2019-01-09 | End: 2019-01-09

## 2019-01-09 NOTE — PLAN OF CARE
Patient/Family Goals    • Patient/Family Long Term Goal Progressing    • Patient/Family Short Term Goal Progressing            Assumed care of pt around 1. Pt A/Ox4. RA. VSS. Denies pain. Left upper chest dressing c/d/i.   Pt resting comfortably in chair

## 2019-01-09 NOTE — CM/SW NOTE
01/09/19 1138   Discharge disposition   Expected discharge disposition Home or Self   Name of Facillity/Home Care/[facility]   Discharge transportation [facility]       Patient discussed in rounds, jemmate d/c today.  Per rn, dtr requesti

## 2019-01-09 NOTE — PLAN OF CARE
Patient/Family Goals    • Patient/Family Long Term Goal Completed    • Patient/Family Short Term Goal Completed        Patient was given discharge instructions in regards to medications, s/s to monitor for, follow up appointments, and activity level.  Paulette

## 2019-01-09 NOTE — CM/SW NOTE
sw arranged [facility] on will call.       RN to call [phone] when ready to DC patient to arrange time for [facility]

## 2019-01-09 NOTE — PROGRESS NOTES
ANN HOSPITALIST  Progress Note     Reese Villanueva Patient Status:  Observation    1934 MRN [id number]   [facility] 2NE-A Attending Teresa Clarke MD   Hosp Day # 0 PCP Kenny Gandhi     Chief Complaint: s/p PPM     S: Patient with Both Eyes Nightly   • enoxaparin  40 mg Subcutaneous Nightly       ASSESSMENT / PLAN:     1. Syncope   1. Due to CHB, s/p PPM  2. CTH neg for acute bleed  3. Cardiology rec appreciated   2. Bradycardia with 3rd degree AV block   1. S/p PPM 1/7/19  3.  Chron

## 2019-01-11 NOTE — DISCHARGE SUMMARY
ANN HOSPITALIST  DISCHARGE SUMMARY     Syeda Villanueva Patient Status:  Inpatient    1934 MRN [id number]   [facility] 2NE-A Attending No att. providers found   Hosp Day # 1 PCP Chrisitne Fernandez     Date of Admission: 2019  Date of expected.                Procedures during hospitalization:   • none    Incidental or significant findings and recommendations (brief descriptions):  • PPM    Lab/Test results pending at Discharge:   · no    Consultants:  • Cardiology    Discharge Medicatio supplement      Take 1,200 mg by mouth daily. Refills:  0     gabapentin 600 MG Tabs  Commonly known as:  NEURONTIN  Notes to patient:  Neurologic pain medication      Take 600 mg by mouth 3 (three) times daily.    Refills:  0     MIRALAX OR  Notes to pat [phone]    Schedule an appointment as soon as possible for a visit  follow up for management of chronic pain/medications/pump    Jose Vo MD  [address] [place] [address] [phone]    On 1/23/2019  2:30pm with pacemaker

## 2019-01-13 ENCOUNTER — HOSPITAL ENCOUNTER (INPATIENT)
Facility: HOSPITAL | Age: 84
LOS: 14 days | Discharge: NURSING, REHAB OR LONG-TERM CARE | DRG: 037 | End: 2019-01-27
Attending: EMERGENCY MEDICINE | Admitting: HOSPITALIST
Payer: MEDICARE

## 2019-01-13 ENCOUNTER — APPOINTMENT (OUTPATIENT)
Dept: CT IMAGING | Facility: HOSPITAL | Age: 84
DRG: 037 | End: 2019-01-13
Attending: EMERGENCY MEDICINE
Payer: MEDICARE

## 2019-01-13 ENCOUNTER — APPOINTMENT (OUTPATIENT)
Dept: GENERAL RADIOLOGY | Facility: HOSPITAL | Age: 84
DRG: 037 | End: 2019-01-13
Attending: EMERGENCY MEDICINE
Payer: MEDICARE

## 2019-01-13 DIAGNOSIS — R47.01 APHASIA DUE TO ACUTE CEREBROVASCULAR ACCIDENT (CVA) (HCC): Primary | ICD-10-CM

## 2019-01-13 DIAGNOSIS — I63.9 APHASIA DUE TO ACUTE CEREBROVASCULAR ACCIDENT (CVA) (HCC): Primary | ICD-10-CM

## 2019-01-13 PROBLEM — I65.21 SYMPTOMATIC STENOSIS OF RIGHT CAROTID ARTERY: Chronic | Status: ACTIVE | Noted: 2019-01-13

## 2019-01-13 PROBLEM — I67.89 ISCHEMIC ENCEPHALOPATHY: Status: ACTIVE | Noted: 2019-01-13

## 2019-01-13 LAB
ALBUMIN SERPL-MCNC: 3.3 G/DL (ref 3.1–4.5)
ALBUMIN/GLOB SERPL: 0.7 {RATIO} (ref 1–2)
ALP LIVER SERPL-CCNC: 91 U/L (ref 55–142)
ALT SERPL-CCNC: 20 U/L (ref 14–54)
ANION GAP SERPL CALC-SCNC: 4 MMOL/L (ref 0–18)
AST SERPL-CCNC: 27 U/L (ref 15–41)
ATRIAL RATE: 72 BPM
BASOPHILS # BLD AUTO: 0.09 X10(3) UL (ref 0–0.1)
BASOPHILS NFR BLD AUTO: 1 %
BILIRUB SERPL-MCNC: 0.5 MG/DL (ref 0.1–2)
BILIRUB UR QL STRIP.AUTO: NEGATIVE
BUN BLD-MCNC: 14 MG/DL (ref 8–20)
BUN/CREAT SERPL: 17.3 (ref 10–20)
CALCIUM BLD-MCNC: 9.1 MG/DL (ref 8.3–10.3)
CHLORIDE SERPL-SCNC: 105 MMOL/L (ref 101–111)
CLARITY UR REFRACT.AUTO: CLEAR
CO2 SERPL-SCNC: 30 MMOL/L (ref 22–32)
COLOR UR AUTO: YELLOW
CREAT BLD-MCNC: 0.81 MG/DL (ref 0.55–1.02)
EOSINOPHIL # BLD AUTO: 0.17 X10(3) UL (ref 0–0.3)
EOSINOPHIL NFR BLD AUTO: 1.8 %
ERYTHROCYTE [DISTWIDTH] IN BLOOD BY AUTOMATED COUNT: 12.6 % (ref 11.5–16)
EST. AVERAGE GLUCOSE BLD GHB EST-MCNC: 131 MG/DL (ref 68–126)
GLOBULIN PLAS-MCNC: 5 G/DL (ref 2.8–4.4)
GLUCOSE BLD-MCNC: 100 MG/DL (ref 65–99)
GLUCOSE BLD-MCNC: 109 MG/DL (ref 70–99)
GLUCOSE BLD-MCNC: 99 MG/DL (ref 65–99)
GLUCOSE UR STRIP.AUTO-MCNC: NEGATIVE MG/DL
HBA1C MFR BLD HPLC: 6.2 % (ref ?–5.7)
HCT VFR BLD AUTO: 39.5 % (ref 34–50)
HGB BLD-MCNC: 12.4 G/DL (ref 12–16)
IMMATURE GRANULOCYTE COUNT: 0.02 X10(3) UL (ref 0–1)
IMMATURE GRANULOCYTE RATIO %: 0.2 %
INR BLD: 1.02 (ref 0.9–1.1)
KETONES UR STRIP.AUTO-MCNC: NEGATIVE MG/DL
LEUKOCYTE ESTERASE UR QL STRIP.AUTO: NEGATIVE
LYMPHOCYTES # BLD AUTO: 1.69 X10(3) UL (ref 0.9–4)
LYMPHOCYTES NFR BLD AUTO: 18.4 %
M PROTEIN MFR SERPL ELPH: 8.3 G/DL (ref 6.4–8.2)
MCH RBC QN AUTO: 28.7 PG (ref 27–33.2)
MCHC RBC AUTO-ENTMCNC: 31.4 G/DL (ref 31–37)
MCV RBC AUTO: 91.4 FL (ref 81–100)
MONOCYTES # BLD AUTO: 0.81 X10(3) UL (ref 0.1–1)
MONOCYTES NFR BLD AUTO: 8.8 %
NEUTROPHIL ABS PRELIM: 6.42 X10 (3) UL (ref 1.3–6.7)
NEUTROPHILS # BLD AUTO: 6.42 X10(3) UL (ref 1.3–6.7)
NEUTROPHILS NFR BLD AUTO: 69.8 %
NITRITE UR QL STRIP.AUTO: NEGATIVE
OSMOLALITY SERPL CALC.SUM OF ELEC: 289 MOSM/KG (ref 275–295)
P AXIS: 62 DEGREES
P-R INTERVAL: 278 MS
PH UR STRIP.AUTO: 7 [PH] (ref 4.5–8)
PLATELET # BLD AUTO: 268 10(3)UL (ref 150–450)
POTASSIUM SERPL-SCNC: 4.8 MMOL/L (ref 3.6–5.1)
PROT UR STRIP.AUTO-MCNC: NEGATIVE MG/DL
PSA SERPL DL<=0.01 NG/ML-MCNC: 13.8 SECONDS (ref 12.4–14.7)
Q-T INTERVAL: 456 MS
QRS DURATION: 170 MS
QTC CALCULATION (BEZET): 499 MS
R AXIS: -85 DEGREES
RBC # BLD AUTO: 4.32 X10(6)UL (ref 3.8–5.1)
RBC UR QL AUTO: NEGATIVE
RED CELL DISTRIBUTION WIDTH-SD: 42.7 FL (ref 35.1–46.3)
SODIUM SERPL-SCNC: 139 MMOL/L (ref 136–144)
SP GR UR STRIP.AUTO: 1.01 (ref 1–1.03)
T AXIS: 59 DEGREES
UROBILINOGEN UR STRIP.AUTO-MCNC: <2 MG/DL
VENTRICULAR RATE: 72 BPM
WBC # BLD AUTO: 9.2 X10(3) UL (ref 4–13)

## 2019-01-13 PROCEDURE — 71045 X-RAY EXAM CHEST 1 VIEW: CPT | Performed by: EMERGENCY MEDICINE

## 2019-01-13 PROCEDURE — 99221 1ST HOSP IP/OBS SF/LOW 40: CPT | Performed by: HOSPITALIST

## 2019-01-13 PROCEDURE — 99291 CRITICAL CARE FIRST HOUR: CPT | Performed by: NURSE PRACTITIONER

## 2019-01-13 PROCEDURE — 70496 CT ANGIOGRAPHY HEAD: CPT | Performed by: EMERGENCY MEDICINE

## 2019-01-13 PROCEDURE — 70450 CT HEAD/BRAIN W/O DYE: CPT | Performed by: EMERGENCY MEDICINE

## 2019-01-13 PROCEDURE — 70498 CT ANGIOGRAPHY NECK: CPT | Performed by: EMERGENCY MEDICINE

## 2019-01-13 RX ORDER — POLYETHYLENE GLYCOL 3350 17 G/17G
17 POWDER, FOR SOLUTION ORAL DAILY PRN
Status: DISCONTINUED | OUTPATIENT
Start: 2019-01-13 | End: 2019-01-21

## 2019-01-13 RX ORDER — SODIUM CHLORIDE 9 MG/ML
INJECTION, SOLUTION INTRAVENOUS CONTINUOUS
Status: ACTIVE | OUTPATIENT
Start: 2019-01-13 | End: 2019-01-15

## 2019-01-13 RX ORDER — ASPIRIN 325 MG
325 TABLET ORAL DAILY
Status: DISCONTINUED | OUTPATIENT
Start: 2019-01-13 | End: 2019-01-23

## 2019-01-13 RX ORDER — ACETAMINOPHEN 325 MG/1
650 TABLET ORAL EVERY 4 HOURS PRN
Status: DISCONTINUED | OUTPATIENT
Start: 2019-01-13 | End: 2019-01-27

## 2019-01-13 RX ORDER — ACETAMINOPHEN 650 MG/1
650 SUPPOSITORY RECTAL EVERY 4 HOURS PRN
Status: DISCONTINUED | OUTPATIENT
Start: 2019-01-13 | End: 2019-01-24

## 2019-01-13 RX ORDER — GABAPENTIN 600 MG/1
600 TABLET ORAL 3 TIMES DAILY
Status: DISCONTINUED | OUTPATIENT
Start: 2019-01-13 | End: 2019-01-17

## 2019-01-13 RX ORDER — ENOXAPARIN SODIUM 100 MG/ML
40 INJECTION SUBCUTANEOUS NIGHTLY
Status: DISCONTINUED | OUTPATIENT
Start: 2019-01-13 | End: 2019-01-23

## 2019-01-13 RX ORDER — PHENYLEPHRINE HCL IN 0.9% NACL 50MG/250ML
PLASTIC BAG, INJECTION (ML) INTRAVENOUS CONTINUOUS PRN
Status: DISCONTINUED | OUTPATIENT
Start: 2019-01-13 | End: 2019-01-21

## 2019-01-13 RX ORDER — DOCUSATE SODIUM 100 MG/1
100 CAPSULE, LIQUID FILLED ORAL 2 TIMES DAILY
Status: DISCONTINUED | OUTPATIENT
Start: 2019-01-13 | End: 2019-01-20

## 2019-01-13 RX ORDER — SODIUM PHOSPHATE, DIBASIC AND SODIUM PHOSPHATE, MONOBASIC 7; 19 G/133ML; G/133ML
1 ENEMA RECTAL ONCE AS NEEDED
Status: DISCONTINUED | OUTPATIENT
Start: 2019-01-13 | End: 2019-01-27

## 2019-01-13 RX ORDER — MORPHINE SULFATE 4 MG/ML
1 INJECTION, SOLUTION INTRAMUSCULAR; INTRAVENOUS EVERY 2 HOUR PRN
Status: DISCONTINUED | OUTPATIENT
Start: 2019-01-13 | End: 2019-01-14

## 2019-01-13 RX ORDER — FAMOTIDINE 10 MG/ML
20 INJECTION, SOLUTION INTRAVENOUS DAILY
Status: DISCONTINUED | OUTPATIENT
Start: 2019-01-13 | End: 2019-01-14

## 2019-01-13 RX ORDER — ASPIRIN 300 MG
300 SUPPOSITORY, RECTAL RECTAL DAILY
Status: DISCONTINUED | OUTPATIENT
Start: 2019-01-13 | End: 2019-01-20

## 2019-01-13 RX ORDER — LABETALOL HYDROCHLORIDE 5 MG/ML
10 INJECTION, SOLUTION INTRAVENOUS EVERY 10 MIN PRN
Status: DISCONTINUED | OUTPATIENT
Start: 2019-01-13 | End: 2019-01-27

## 2019-01-13 RX ORDER — FAMOTIDINE 20 MG/1
20 TABLET ORAL DAILY
Status: DISCONTINUED | OUTPATIENT
Start: 2019-01-13 | End: 2019-01-14

## 2019-01-13 RX ORDER — BISACODYL 10 MG
10 SUPPOSITORY, RECTAL RECTAL
Status: DISCONTINUED | OUTPATIENT
Start: 2019-01-13 | End: 2019-01-24

## 2019-01-13 RX ORDER — ATORVASTATIN CALCIUM 80 MG/1
80 TABLET, FILM COATED ORAL NIGHTLY
Status: DISCONTINUED | OUTPATIENT
Start: 2019-01-13 | End: 2019-01-16

## 2019-01-13 RX ORDER — BRIMONIDINE TARTRATE 2 MG/ML
1 SOLUTION/ DROPS OPHTHALMIC EVERY 8 HOURS SCHEDULED
Status: DISCONTINUED | OUTPATIENT
Start: 2019-01-13 | End: 2019-01-27

## 2019-01-13 RX ORDER — MORPHINE SULFATE 4 MG/ML
2 INJECTION, SOLUTION INTRAMUSCULAR; INTRAVENOUS EVERY 2 HOUR PRN
Status: DISCONTINUED | OUTPATIENT
Start: 2019-01-13 | End: 2019-01-14

## 2019-01-13 RX ORDER — SENNOSIDES 8.6 MG
17.2 TABLET ORAL NIGHTLY
Status: DISCONTINUED | OUTPATIENT
Start: 2019-01-13 | End: 2019-01-20

## 2019-01-13 RX ORDER — METOCLOPRAMIDE HYDROCHLORIDE 5 MG/ML
10 INJECTION INTRAMUSCULAR; INTRAVENOUS EVERY 8 HOURS PRN
Status: DISCONTINUED | OUTPATIENT
Start: 2019-01-13 | End: 2019-01-27

## 2019-01-13 RX ORDER — DOCUSATE SODIUM 100 MG/1
100 CAPSULE, LIQUID FILLED ORAL
Status: DISCONTINUED | OUTPATIENT
Start: 2019-01-13 | End: 2019-01-24

## 2019-01-13 RX ORDER — MELATONIN
325
Status: DISCONTINUED | OUTPATIENT
Start: 2019-01-14 | End: 2019-01-23

## 2019-01-13 RX ORDER — ALBUTEROL SULFATE 90 UG/1
2 AEROSOL, METERED RESPIRATORY (INHALATION) EVERY 4 HOURS PRN
Status: DISCONTINUED | OUTPATIENT
Start: 2019-01-13 | End: 2019-01-27

## 2019-01-13 RX ORDER — ONDANSETRON 2 MG/ML
4 INJECTION INTRAMUSCULAR; INTRAVENOUS EVERY 6 HOURS PRN
Status: DISCONTINUED | OUTPATIENT
Start: 2019-01-13 | End: 2019-01-27

## 2019-01-13 NOTE — ED INITIAL ASSESSMENT (HPI)
Patient arrived via ambulance called for ams onset per medics 20min     Patient felt she just wasn't right she went by her neighbors and asked them to call 911    Patient is unsure as to what time she went to bed last night she appears very confused keeps

## 2019-01-13 NOTE — ED PROVIDER NOTES
Patient Seen in: [facility] Emergency Department    History   Patient presents with:  Altered Mental Status (neurologic)    Stated Complaint: ams    HPI    Patient presents to the emergency department after she complained of not quite feeling hersel EOM are normal.   Neck: Normal range of motion. Neck supple. Carotid bruit is present (r sided). Cardiovascular: Normal rate, regular rhythm and intact distal pulses. Murmur heard. Systolic murmur is present with a grade of 2/6.   Pulmonary/Chest: Eff Paced  Reading: Paced complexes           ED Course as of Jan 13 1148  ------------------------------------------------------------  Time: 01/13 1015  Comment: Chemistries and CBC were unremarkable.  -------------------------------------------------------- complex decision making, and/or extensive discussions with the patient, family, and clinical staff.

## 2019-01-13 NOTE — ED NOTES
Report called to [facility] RN Waiting for transport for assistance.  Neuro remain the same  Patient is confused (expressive aphasia)

## 2019-01-13 NOTE — ED NOTES
Patient lives at [address] independent resident. Not to sure what happened today  The daughter was called this am.   [address] [facility] her daughter. ..

## 2019-01-13 NOTE — ED NOTES
Daughter states that she is usually alert since the pacemaker she seemed more alert. Her daughter called her last night and she seemed anxious and confused.  Pain pump filled every 5 weeks Dr Vilma Blackmon [place]   Daughter said that she is an anxious person, worrie

## 2019-01-13 NOTE — H&P
ANN HOSPITALIST  History and Physical     Ryann Villanueva Patient Status:  Emergency    1934 MRN [id number]   Location [address] [facility] Attending Jaclyn Reyes MD   Hosp Day # 0 PCP Mary Riggs     Chief Complaint: Archana Powell pancreas  Lactose                 DIARRHEA  Medications:    No current facility-administered medications on file prior to encounter.    Current Outpatient Medications on File Prior to Encounter:  lisinopril 5 MG Oral Tab Take 1 tablet (5 mg total) by mouth Polyethylene Glycol 3350 (MIRALAX OR) Take 17 g by mouth daily. Disp:  Rfl:    Albuterol Sulfate HFA (VENTOLIN HFA) 108 (90 Base) MCG/ACT Inhalation Aero Soln Inhale 2 puffs into the lungs every 4 (four) hours as needed for Wheezing.  Disp: 1 Inhaler Rf 30.0  30.0   ALKPHO   --    --   91   AST   --    --   27   ALT   --    --   20   BILT   --    --   0.5   TP   --    --   8.3*     Recent Labs   Lab  01/07/19   1209   PTP  14.7   INR  1.10     Recent Labs   Lab  01/07/19 1209   TROP  <0.046     Imaging:

## 2019-01-13 NOTE — CONSULTS
[facility] Cardiology  [address] [facility] Patient Status:  Emergency    1934 MRN [id number]   Location [address] [facility] [address] Attending Shi Wright MD   Hosp Day # 0 PCP Kar Sanders     Subjective:  See my recent consult 01/13/2019    CREATSERUM 0.81 01/13/2019     Recent Labs   Lab  01/07/19   1209   PTP  14.7   INR  1.10         Medications:  Reviewed.     Assessment and Plan:  Principal Problem:    Symptomatic stenosis of right carotid artery  Active Problems:    Complet

## 2019-01-13 NOTE — ED NOTES
Patient is incontinent bed linens changed. Right side of her buttocks is bruised.  I questioned it she said she fell last week She can not remember how she fell

## 2019-01-13 NOTE — ICD/PM
[employer] dual chamber pacemaker implanted 1/7/18    Generator Accol[employer] is MRI conditional  RA and RV leads [employer] are MRI conditional    I spoke to technical services at [employer] they recommend waiting 6 weeks post implant    Vinita Sicard

## 2019-01-14 LAB
ANION GAP SERPL CALC-SCNC: 6 MMOL/L (ref 0–18)
BASOPHILS # BLD AUTO: 0.09 X10(3) UL (ref 0–0.1)
BASOPHILS NFR BLD AUTO: 1.3 %
BUN BLD-MCNC: 10 MG/DL (ref 8–20)
BUN/CREAT SERPL: 15.2 (ref 10–20)
CALCIUM BLD-MCNC: 8.5 MG/DL (ref 8.3–10.3)
CHLORIDE SERPL-SCNC: 104 MMOL/L (ref 101–111)
CO2 SERPL-SCNC: 28 MMOL/L (ref 22–32)
CREAT BLD-MCNC: 0.66 MG/DL (ref 0.55–1.02)
EOSINOPHIL # BLD AUTO: 0.3 X10(3) UL (ref 0–0.3)
EOSINOPHIL NFR BLD AUTO: 4.2 %
ERYTHROCYTE [DISTWIDTH] IN BLOOD BY AUTOMATED COUNT: 12.7 % (ref 11.5–16)
GLUCOSE BLD-MCNC: 102 MG/DL (ref 65–99)
GLUCOSE BLD-MCNC: 130 MG/DL (ref 65–99)
GLUCOSE BLD-MCNC: 132 MG/DL (ref 65–99)
GLUCOSE BLD-MCNC: 90 MG/DL (ref 70–99)
GLUCOSE BLD-MCNC: 91 MG/DL (ref 65–99)
HAV IGM SER QL: 1.8 MG/DL (ref 1.8–2.5)
HCT VFR BLD AUTO: 34.1 % (ref 34–50)
HGB BLD-MCNC: 11 G/DL (ref 12–16)
IMMATURE GRANULOCYTE COUNT: 0.01 X10(3) UL (ref 0–1)
IMMATURE GRANULOCYTE RATIO %: 0.1 %
LYMPHOCYTES # BLD AUTO: 1.97 X10(3) UL (ref 0.9–4)
LYMPHOCYTES NFR BLD AUTO: 27.6 %
MCH RBC QN AUTO: 29.5 PG (ref 27–33.2)
MCHC RBC AUTO-ENTMCNC: 32.3 G/DL (ref 31–37)
MCV RBC AUTO: 91.4 FL (ref 81–100)
MONOCYTES # BLD AUTO: 0.77 X10(3) UL (ref 0.1–1)
MONOCYTES NFR BLD AUTO: 10.8 %
NEUTROPHIL ABS PRELIM: 4.01 X10 (3) UL (ref 1.3–6.7)
NEUTROPHILS # BLD AUTO: 4.01 X10(3) UL (ref 1.3–6.7)
NEUTROPHILS NFR BLD AUTO: 56 %
OSMOLALITY SERPL CALC.SUM OF ELEC: 285 MOSM/KG (ref 275–295)
PHOSPHATE SERPL-MCNC: 2.8 MG/DL (ref 2.5–4.9)
PLATELET # BLD AUTO: 264 10(3)UL (ref 150–450)
POTASSIUM SERPL-SCNC: 4.1 MMOL/L (ref 3.6–5.1)
RBC # BLD AUTO: 3.73 X10(6)UL (ref 3.8–5.1)
RED CELL DISTRIBUTION WIDTH-SD: 41.7 FL (ref 35.1–46.3)
SODIUM SERPL-SCNC: 138 MMOL/L (ref 136–144)
WBC # BLD AUTO: 7.2 X10(3) UL (ref 4–13)

## 2019-01-14 PROCEDURE — 99232 SBSQ HOSP IP/OBS MODERATE 35: CPT | Performed by: HOSPITALIST

## 2019-01-14 PROCEDURE — 99291 CRITICAL CARE FIRST HOUR: CPT | Performed by: OTHER

## 2019-01-14 RX ORDER — TRAVOPROST OPHTHALMIC SOLUTION 0.04 MG/ML
1 SOLUTION OPHTHALMIC NIGHTLY
COMMUNITY

## 2019-01-14 RX ORDER — OXYBUTYNIN CHLORIDE 5 MG/1
5 TABLET ORAL 3 TIMES DAILY
Status: ON HOLD | COMMUNITY
End: 2019-01-16

## 2019-01-14 RX ORDER — BRIMONIDINE TARTRATE 2 MG/ML
1 SOLUTION/ DROPS OPHTHALMIC 2 TIMES DAILY
Status: ON HOLD | COMMUNITY
End: 2019-01-01

## 2019-01-14 RX ORDER — MAGNESIUM OXIDE 400 MG (241.3 MG MAGNESIUM) TABLET
400 TABLET ONCE
Status: COMPLETED | OUTPATIENT
Start: 2019-01-14 | End: 2019-01-14

## 2019-01-14 NOTE — CONSULTS
[id number] [address] [facility] Interventional Neuro Radiology Consult    Nikolai Osborn Patient Status:  Inpatient    1934 MRN [id number]   [facility] 6NE-A Attending Leighann Cutler MD   Hosp Day # 1 PCP Sonny Handy [phone] that she does not drink alcohol.     ALLERGIES:    Hctz [Hydrochloroth*    OTHER (SEE COMMENTS)    Comment:Per  Pt affected the pancreas  Lactose                 DIARRHEA    MEDICATIONS:    Medications Prior to Admission:  lisinopril 5 MG Oral Tab Take 1 ta Rfl:  1/7/2019 at Unknown time   Cholecalciferol (VITAMIN D3) 1000 units Oral Cap Take 1 tablet by mouth daily.  Disp:  Rfl:  1/7/2019 at Unknown time   Ferrous Sulfate 325 (65 Fe) MG Oral Tab Take 325 mg by mouth daily with breakfast. Disp:  Rfl:  1/7/2019 Q8H PRN   famoTIDine (PEPCID) tab 20 mg 20 mg Oral Daily   Or      famoTIDine (PEPCID) injection 20 mg 20 mg Intravenous Daily   Enoxaparin Sodium (LOVENOX) 40 MG/0.4ML injection 40 mg 40 mg Subcutaneous Nightly   morphINE sulfate (PF) 4 MG/ML injection 1 DIAGNOSTIC DATA: Lab Results   Component Value Date    WBC 7.2 01/14/2019    HGB 11.0 01/14/2019    HCT 34.1 01/14/2019    .0 01/14/2019    CREATSERUM 0.66 01/14/2019    BUN 10 01/14/2019     01/14/2019    K 4.1 01/14/2019     01/1

## 2019-01-14 NOTE — PLAN OF CARE
Impaired Activities of Daily Living    • Achieve highest/safest level of independence in self care Progressing        Impaired Cognition    • Patient will exhibit improved attention, thought processing and/or memory Progressing        Impaired Communicatio

## 2019-01-14 NOTE — PROGRESS NOTES
01/14/19 5363   Clinical Encounter Type   Visited With Patient  ( respected patient's preference to not visit with .)   Continue Visiting No  ( remains available at pager #[phone] as needed/requested.)   Bahai Encounters   Sigrid Bosworth

## 2019-01-14 NOTE — PROGRESS NOTES
ANN HOSPITALIST  Progress Note     Mildred Vincent Patient Status:  Inpatient    1934 MRN [id number]   [facility] 6NE-A Attending Cekristine Varela MD   [facility] Day # 1 PCP Mary Riggs     Chief Complaint:  Acute confusion    S: Patie 01/13/19   0930   PTP  14.7  13.8   INR  1.10  1.02       Recent Labs   Lab  01/07/19   1209   TROP  <0.046          Imaging: Imaging data reviewed in Epic.     Medications:   • brimonidine Tartrate  1 drop Both Eyes Q8H [facility] & long-term   • ferrous sulfate  325 mg Mike Conn Lives alone     Plan of care discussed with RN, pt   Myra Quintana NP,   1/14/2019  [id number]    Addendum  Patient seen and examined  Chest: CTA B/L  CVS: S1, S2, RRR  ABD: Soft, NT, ND, BS+  EXT: No c/c    Imaging: Reviewed  Agree with above  Motor intac

## 2019-01-14 NOTE — PHYSICAL THERAPY NOTE
Attempted to see pt for PT eval, but spoke with Neuro APN and will wait until following MRI to see if upgraded activity orders are appropriate.   Will f/u again later as indicated and if time permits

## 2019-01-14 NOTE — PROGRESS NOTES
[facility] Consult Follow Up Note      Jes Vincent Patient Status:  Inpatient    1934 MRN [id number]   [facility] 6NE-A Attending Eladio Pugh MD   Hosp Day # 1 PCP Lucien Prieto     Reason for Consultation mg, Rectal, Daily PRN  •  FLEET ENEMA (FLEET) 7-19 GM/118ML enema 133 mL, 1 enema, Rectal, Once PRN  •  ondansetron HCl (ZOFRAN) injection 4 mg, 4 mg, Intravenous, Q6H PRN **OR** Metoclopramide HCl (REGLAN) injection 10 mg, 10 mg, Intravenous, Q8H PRN  • 01/14/19   0418   GLU  93  109*  90   BUN  15  14  10   CREATSERUM  0.70  0.81  0.66   GFRAA  92  77  94   GFRNAA  80  67  81   CA  7.9*  9.1  8.5   NA  142  139  138   K  4.2  4.8  4.1   CL  109  105  104   CO2  30.0  30.0  28.0       Recent Labs   Lab  0

## 2019-01-14 NOTE — CONSULTS
[facility]  Neurocritical Care       Name: Nikolai Osborn  MRN: [id number]  Admission Date/Time: 1/13/2019  9:24 AM  Primary Care Provider:  Cristina Hennessy        Reason for Consultation:   Carotid stenosis and confusion    HPI:   Paulette • HIP REPLACEMENT SURGERY     • TOTAL HIP REPLACEMENT           Medications Prior to Admission:  lisinopril 5 MG Oral Tab Take 1 tablet (5 mg total) by mouth daily.  Disp: 30 tablet Rfl: 3    gabapentin 600 MG Oral Tab Take 600 mg by mouth 3 (three) times 1/7/2019 at Unknown time   Ferrous Sulfate 325 (65 Fe) MG Oral Tab Take 325 mg by mouth daily with breakfast. Disp:  Rfl:  1/7/2019 at Unknown time   Probiotic Product (PROBIOTIC COLON SUPPORT OR) Take 1 capsule by mouth daily.    Disp:  Rfl:  1/7/2019 at 3350 (MIRALAX) powder packet 17 g 17 g Oral Daily PRN   magnesium hydroxide (MILK OF MAGNESIA) 400 MG/5ML suspension 30 mL 30 mL Oral Daily PRN   bisacodyl (DULCOLAX) rectal suppository 10 mg 10 mg Rectal Daily PRN   FLEET ENEMA (FLEET) 7-19 GM/118ML enema mass index is 23.9 kg/m².     Intake/Output:    Intake/Output Summary (Last 24 hours) at 1/14/2019 1416  Last data filed at 1/14/2019 2948  Gross per 24 hour   Intake 894 ml   Output 850 ml   Net 44 ml       Neuro exam: MS: Awake, Alert, Oriented to person, brain. Dose reduction techniques were used. Dose information is transmitted to the [facility] of Radiology) NRDR ([address]) which includes the Dose Index Registry.   PATIENT STATED HISTORY: (As transcribed by Technologist) vessels. SINUSES:           No sign of acute sinusitis. MASTOIDS:          No sign of acute inflammation. SKULL:             No evidence for fracture or osseous abnormality. OTHER:             None. CONCLUSION:  1. No acute process.  2. Mild diffuse a Left ICA/CCA Velocity Ratio:  2.01  The vertebral arteries demonstrate antegrade flow. CONCLUSION:  1. There is greater than 70% stenosis but less than near occlusion involving the right proximal ICA.   At directed imaging there is approxim 1/07/2019 at 21:14     Approved by: Hayes Hou MD            Xr Chest Ap Portable  (cpt=71045)    Result Date: 1/7/2019  PROCEDURE:  XR CHEST AP PORTABLE  (CPT=71045)  TECHNIQUE:  AP chest radiograph was obtained. COMPARISON:  None.   INDICATIONS: performed through the carotid and vertebral arteries. All measurements obtained in this exam were performed using NASCET criteria. Dose reduction techniques were used.  Dose information is transmitted to the [facility] of Radiology) [facility] There is a bovine arch. The visualized soft tissues of the neck are also unremarkable. CONCLUSION:   1. Approximately 80% stenosis secondary to irregular eccentric plaque of the RIGHT proximal internal carotid artery.    2.  Approximately 50-60% st 24 hours) at 1/14/2019 1416  Last data filed at 1/14/2019 0958  Gross per 24 hour   Intake 894 ml   Output 850 ml   Net 44 ml   -   - IV Fluids NS at 75ml/hr  - Electrolytes – Na 138, K 4.1, Mg 1.8    GI:  - GI Prophylaxis not indicated  - Bowel Regimen

## 2019-01-14 NOTE — PROGRESS NOTES
[facility]  Neurocritical Care APRN Progress Note    NAME: Lacho Mullen - ROOM: 8826/6022-H - MRN: [id number] - Age: 80year old - :1934    History Of Present Illness:  Lacho Mullen is a 80year old female with PMHx sig and negatives noted in the HPI.     OBJECTIVE  Vitals:  BP (!) 168/79 (BP Location: Left arm)   Pulse 71   Temp 98.5 °F (36.9 °C) (Temporal)   Resp 19   Ht 160 cm (5' 3\")   Wt 135 lb (61.2 kg)   SpO2 92%   BMI 23.91 kg/m²    Physical Exam:    General Appea Brain Or Head (56058)  Result Date: 1/13/2019  CONCLUSION:  No acute intracranial process.     Dictated by: Josh Ryder MD on 1/13/2019 at 10:17     Approved by: Josh Ryder MD            Xr Chest Ap Portable  (cpt=71045)  Result Date: 1/13/2019  CONCLU Morphine ordered prn  7.   Encephalopathy--increased confusion reported in ED--appears resolved on exam--likely 2/2 sequelae from possible ischemic event        - Ischemic stroke order set      - Neuro checks Q1h      - NIH daily      - 0.9NS at 75      - M

## 2019-01-14 NOTE — PLAN OF CARE
Arrive from ED 1430  Alert, oriented to name, states age 80 (but later unable to state her appropriate age again with subsequent assessments). Confused. Does not know why she is here. Has expressive and some receptive aphasia. Clear sleep.   Stumbles over

## 2019-01-14 NOTE — PROGRESS NOTES
Pain Service  Patient readmitted to rule out stroke.     Consulted to interrogate Intrathecal pump:  Morphine 25mg/ml concentration  11.995mg/day continuous rate  Low reservoir alarm date 2/10/19     Patient's Pain MD   Dr. Usha Ambrocio  [address]

## 2019-01-14 NOTE — OCCUPATIONAL THERAPY NOTE
Attempted to see pt for OT eval, but spoke with Neuro APN and will wait until following MRI to see if upgraded activity orders are appropriate. Will f/u again later as indicated and if time permits.

## 2019-01-14 NOTE — SLP NOTE
ADULT SWALLOWING EVALUATION    ASSESSMENT    ASSESSMENT/OVERALL IMPRESSION:  Order was received for bedside swallow evaluation to r/o aspiration. Pt is a 79 y/o female with recent admission for heart block and PPM insertion.  H/o syncope, HTN and current bi Recommendations: One pill at a time  Treatment Plan/Recommendations: Communication evaluation;Dysphagia therapy  Discharge Recommendations/Plan: Undetermined    HISTORY   MEDICAL HISTORY  Reason for Referral: Stroke protocol;R/O aspiration    Problem List at 11:30       Approved by: Maximo Dumont MD                SUBJECTIVE       OBJECTIVE   ORAL MOTOR EXAMINATION  Dentition: Natural;Functional  Symmetry: Within Functional Limits  Strength:  Within Functional Limits  Tone: Within Functional Limits  Range of

## 2019-01-15 ENCOUNTER — APPOINTMENT (OUTPATIENT)
Dept: MRI IMAGING | Facility: HOSPITAL | Age: 84
DRG: 037 | End: 2019-01-15
Attending: EMERGENCY MEDICINE
Payer: MEDICARE

## 2019-01-15 LAB
ANION GAP SERPL CALC-SCNC: 6 MMOL/L (ref 0–18)
BUN BLD-MCNC: 8 MG/DL (ref 8–20)
BUN/CREAT SERPL: 13.1 (ref 10–20)
CALCIUM BLD-MCNC: 8.4 MG/DL (ref 8.3–10.3)
CHLORIDE SERPL-SCNC: 104 MMOL/L (ref 101–111)
CO2 SERPL-SCNC: 29 MMOL/L (ref 22–32)
CREAT BLD-MCNC: 0.61 MG/DL (ref 0.55–1.02)
ERYTHROCYTE [DISTWIDTH] IN BLOOD BY AUTOMATED COUNT: 12.5 % (ref 11.5–16)
GLUCOSE BLD-MCNC: 101 MG/DL (ref 70–99)
GLUCOSE BLD-MCNC: 103 MG/DL (ref 65–99)
GLUCOSE BLD-MCNC: 111 MG/DL (ref 65–99)
HAV IGM SER QL: 1.7 MG/DL (ref 1.8–2.5)
HCT VFR BLD AUTO: 36.3 % (ref 34–50)
HGB BLD-MCNC: 11.7 G/DL (ref 12–16)
MCH RBC QN AUTO: 29.3 PG (ref 27–33.2)
MCHC RBC AUTO-ENTMCNC: 32.2 G/DL (ref 31–37)
MCV RBC AUTO: 91 FL (ref 81–100)
OSMOLALITY SERPL CALC.SUM OF ELEC: 286 MOSM/KG (ref 275–295)
PLATELET # BLD AUTO: 274 10(3)UL (ref 150–450)
POTASSIUM SERPL-SCNC: 4 MMOL/L (ref 3.6–5.1)
RBC # BLD AUTO: 3.99 X10(6)UL (ref 3.8–5.1)
RED CELL DISTRIBUTION WIDTH-SD: 41.4 FL (ref 35.1–46.3)
SODIUM SERPL-SCNC: 139 MMOL/L (ref 136–144)
WBC # BLD AUTO: 9.3 X10(3) UL (ref 4–13)

## 2019-01-15 PROCEDURE — 99232 SBSQ HOSP IP/OBS MODERATE 35: CPT | Performed by: HOSPITALIST

## 2019-01-15 PROCEDURE — 70551 MRI BRAIN STEM W/O DYE: CPT | Performed by: NURSE PRACTITIONER

## 2019-01-15 RX ORDER — MAGNESIUM OXIDE 400 MG (241.3 MG MAGNESIUM) TABLET
400 TABLET ONCE
Status: COMPLETED | OUTPATIENT
Start: 2019-01-15 | End: 2019-01-15

## 2019-01-15 NOTE — PROGRESS NOTES
[facility] Cardiology  Progress Note    Estel Citizen Patient Status:  Inpatient    1934 MRN [id number]   [facility] 6NE-A Attending Yulisa Duggan MD   Hosp Day # 2 PCP Nilo Poll     Subjective:  No cardiac issues over night.   Se Problem:    Aphasia due to acute cerebrovascular accident (CVA) ([facility] [address]  Active Problems:    Complete heart block (HCC)    Encephalopathy, ISCHEMIC    Symptomatic stenosis of right carotid artery    Probable dementia    TIA in distribution of high grade barton

## 2019-01-15 NOTE — PROGRESS NOTES
ANN HOSPITALIST  Progress Note     Lakeishajuliano Gomeznicole Patient Status:  Inpatient    1934 MRN [id number]   [facility] 6NE-A Attending Yung Aguero MD   Psychiatric Day # 2 PCP Anna Saenz     Chief Complaint:  Acute confusion    S: Patie last 168 hours. Imaging: Imaging data reviewed in Epic.     Medications:   • mupirocin  1 Application Nasal U98J   • brimonidine Tartrate  1 drop Both Eyes Q8H [address]   • ferrous sulfate  325 mg Oral Daily with breakfast   • gabapentin  600 mg Oral TID S2, RRR  ABD: Soft, NT, ND, BS+  EXT: No c/c    Imaging: Reviewed  Plan as above  Still confused but better  Otherwise neuro intact  F/u MRI Brain  CEA pending clinical course    Christianne Hill MD

## 2019-01-15 NOTE — PROGRESS NOTES
Addendum:    Results of MRI noted. Acute CVA in left parietal region. Doesn't fit with initial clinical motor symptoms in ER on Sunday when I examined her, lateralizing to the left arm, but fits with her aphasia (she is right handed).    This MRI finding

## 2019-01-15 NOTE — PLAN OF CARE
Impaired Communication    • Patient will achieve maximal communication potential Progressing        NEUROLOGICAL - ADULT    • Achieves stable or improved neurological status Progressing    • Achieves maximal functionality and self care Progressing        P

## 2019-01-15 NOTE — PLAN OF CARE
0800 Pt's awake, follows commands, ellison's, toby. 0900 eating breakfast, tolerating well. Dr Hoa Santiago signed consent for pt's approval  For MRI. 1430 down for MRI.  1515 Pt returned to Amandeep ca at bedside before and after MRI  Scan. 1600 Notified o

## 2019-01-15 NOTE — CONSULTS
[facility]    PATIENT'S NAME: Josee Calero   ATTENDING PHYSICIAN: Greta Larose M.D.   [facility] Ada: Brandan Sena M.D.    PATIENT ACCOUNT#:   [de-identified]    LOCATION:  [facility] [id number] [facility]  MEDICAL RECORD #:   [id number]       DATE OF BIRTH: 2 daughters. She has a history of smoking in the past.  There is no EtOH abuse or drug abuse and is currently not smoking. She states she was a  when she was working.       FAMILY HISTORY:  The patient does not recall the history of her mother an and the risk of stroke, death, myocardial infarction, bleeding, infection, cranial nerve injury. Risks and complications of stroke occurring without treatment were also explained. I will talk to the family members.   If she did have a stroke, I would wait

## 2019-01-15 NOTE — CM/SW NOTE
01/15/19 1500   CM/SW Referral Data   Referral Source Physician   Reason for Referral Discharge planning   Informant Patient   Social History   Recreational Drug/Alcohol Use no   Major Changes Last 6 Months no   Domestic/Partner Violence no   Suicidal I

## 2019-01-15 NOTE — OCCUPATIONAL THERAPY NOTE
Attempted to see pt for OT. Pt still awaiting MRI. Will hold until results available. Will re-attempt to see pt as appropriate and as able.

## 2019-01-15 NOTE — PROGRESS NOTES
[facility]  Neurocritical Care       Name: Carlos Mohamud  MRN: [id number]  Admission Date/Time: 1/13/2019  9:24 AM  Primary Care Provider:  Adan Min        Subjective:     Pt with stable deficits.   No events overnight    Patient lisinopril 5 MG Oral Tab Take 1 tablet (5 mg total) by mouth daily. Disp: 30 tablet Rfl: 3    gabapentin 600 MG Oral Tab Take 600 mg by mouth 3 (three) times daily.  Disp:  Rfl:  1/7/2019 at Unknown time   OxyCODONE HCl ER 20 MG Oral Tablet Extended Relea 2 puffs into the lungs every 4 (four) hours as needed for Wheezing.  Disp: 1 Inhaler Rfl: 6 Unknown at Unknown time       Hctz [Hydrochloroth*    OTHER (SEE COMMENTS)    Comment:Per  Pt affected the pancreas  Lactose                 DIARRHEA  Social History HCl (REGLAN) injection 10 mg 10 mg Intravenous Q8H PRN   Enoxaparin Sodium (LOVENOX) 40 MG/0.4ML injection 40 mg 40 mg Subcutaneous Nightly   Labetalol HCl (TRANDATE) injection 10 mg 10 mg Intravenous Q10 Min PRN   niCARdipine HCl in NaCl (CARDENE) 20 mg/2 Chest Pa + Lat Chest (cpt=71046)    Result Date: 1/8/2019  PROCEDURE:  XR CHEST PA + LAT CHEST (CPT=71046)  INDICATIONS:  Pacemaker placement. COMPARISON:  [facility] , XR CHEST AP PORTABLE  (CPT=71045), 1/07/2019, 20:45.   [facility] , XR CHEST AP PORTABLE  (CPT= abnormalities. There is  nothing specific for acute infarct. There is no hemorrhage or mass lesion. SINUSES:           No sign of acute sinusitis. MASTOIDS:          No sign of acute inflammation.  SKULL:             No evidence for fracture or osseous carotid true week, possible LOC, fall  TECHNIQUE:  Real-time gray-scale and duplex ultrasound was used to evaluate the bilateral extracranial carotid and vertebral arteries.   B-mode 2-dimensional images of the vascular structures, Doppler spectral analysis XR CHEST PA + LAT CHEST (MTQ=14791), 1/08/2019, 8:25. INDICATIONS:  ams  PATIENT STATED HISTORY: (As transcribed by Technologist)  Patient offered no additional history at this time. FINDINGS:  Left-sided cardiac pacemaker is stable.   There is no acute lower thorax which could reflect a hiatus hernia. PA and lateral view would be helpful for further finding. Surgical clip projects over  the right hilum. Correlate for any previous thoracotomy.   Mild degenerative changes are seen in the spine and right VENTRICLES:  Mild prominence consistent with atrophy. No enlargement or displacement. CEREBRUM:    Low-attenuation within the periventricular white matter consistent small vessel ischemic change.   No excessive atrophy, mass, or hemorrhage, or abnormal enh 01/15/2019    HCT 36.3 01/15/2019    .0 01/15/2019    CREATSERUM 0.61 01/15/2019    BUN 8 01/15/2019     01/15/2019    K 4.0 01/15/2019     01/15/2019    CO2 29.0 01/15/2019     01/15/2019    CA 8.4 01/15/2019    MG 1.7 01/15/2019 None at bedside  G: none    Goals of the Day: transfer to floor    A total of 35 minutes of care time (exclusive of billable procedures) was administered.  This involved direct patient intervention, complex decision making, and/or extensive discussions with

## 2019-01-15 NOTE — PROGRESS NOTES
[facility]  Interventional Neuroradiology Progress Note    Belinda Bearden Patient Status:  Inpatient    1934 MRN [id number]   [facility] 6NE-A Attending Jesus Joseph MD   Hosp Day # 2 PCP Rhys Fernandez     Subjective:  Norma Corley 01/15/2019    CREATSERUM 0.61 01/15/2019    BUN 8 01/15/2019     01/15/2019    K 4.0 01/15/2019     01/15/2019    CO2 29.0 01/15/2019     01/15/2019    CA 8.4 01/15/2019    MG 1.7 01/15/2019    PGLU 103 01/15/2019     Imaging:  Carotid U

## 2019-01-15 NOTE — PROGRESS NOTES
01/15/19 1024   Clinical Encounter Type   Visited With Health care provider   Routine Visit Follow-up    responded to the consult regarding POLST.  Encouraged KODY Lynch to consult  if the patient/POA wish to facilitate POLST paperwork w/

## 2019-01-16 PROBLEM — I65.29 STENOSIS OF CAROTID ARTERY: Chronic | Status: ACTIVE | Noted: 2019-01-13

## 2019-01-16 LAB
GLUCOSE BLD-MCNC: 113 MG/DL (ref 65–99)
GLUCOSE BLD-MCNC: 123 MG/DL (ref 65–99)
GLUCOSE BLD-MCNC: 159 MG/DL (ref 65–99)
GLUCOSE BLD-MCNC: 91 MG/DL (ref 65–99)
HAV IGM SER QL: 2 MG/DL (ref 1.8–2.5)

## 2019-01-16 PROCEDURE — 99233 SBSQ HOSP IP/OBS HIGH 50: CPT | Performed by: OTHER

## 2019-01-16 PROCEDURE — 99232 SBSQ HOSP IP/OBS MODERATE 35: CPT | Performed by: HOSPITALIST

## 2019-01-16 RX ORDER — TRAVOPROST OPHTHALMIC SOLUTION 0.04 MG/ML
1 SOLUTION OPHTHALMIC NIGHTLY
Status: DISCONTINUED | OUTPATIENT
Start: 2019-01-16 | End: 2019-01-27

## 2019-01-16 RX ORDER — ATORVASTATIN CALCIUM 40 MG/1
40 TABLET, FILM COATED ORAL NIGHTLY
Status: DISCONTINUED | OUTPATIENT
Start: 2019-01-16 | End: 2019-01-27

## 2019-01-16 NOTE — SLP NOTE
SPEECH DAILY NOTE - INPATIENT    ASSESSMENT & PLAN   ASSESSMENT  Pt seen for dysphagia tx to assess tolerance with recommended diet, ensure proper utilization of aspiration precautions and provide pt/family education.   Patient alert and up in bedside chair contact Bri Weaver Catalino 87 CCC-SLP  Pager [phone]

## 2019-01-16 NOTE — PROGRESS NOTES
[facility]  Progress Note     Jenniferbrandan Wright Patient Status:  Inpatient    1934 MRN [id number]   [facility] 6NE-A Attending MICHELLE Victor MD   Hosp Day # 3 PCP Rubin Cabot     Chief Complaint:  Acute confusion    S: Patient Imaging: Imaging data reviewed in Epic.     Medications:   • Netarsudil Dimesylate  1 drop Both Eyes Nightly   • mupirocin  1 Application Nasal V78J   • brimonidine Tartrate  1 drop Both Eyes Q8H [address]   • ferrous sulfate  325 mg Oral Daily with breakfast discharge to:  tbd  Will need more help at home  Lives alone     Plan of care discussed with RN, pt   Sharmila Randhawa NP,   1/16  /2019  [id number]    Patient seen and examined  No complaints of chest pain, shortness of breath  No nausea, vomiting, diarrhea

## 2019-01-16 NOTE — PLAN OF CARE
Assumed care at 2300. Pt drowsy with expressive aphasia. Oriented to self and place. Disoriented to time and situation. RA. V Paced on tele. VSS. Neuro checks q4, see flowsheets. Incontinent, briefed. Safety precautions maintained. Bed alarm on.  Call li

## 2019-01-16 NOTE — PROGRESS NOTES
[id number] [facility] Progress Note    Reese Villanueva Patient Status:  Inpatient    1934 MRN [id number]   [facility] 7NE-A Attending Gela Gallegos MD   Hosp Day # 3 PCP Kenny Gandhi       Neurology Attending note     I ha and rhythm.  S1S2  Skin: warm,dry    Neurologic:   Mental status: Awake   Orientation: oriented to person and place, had difficulty with year    Speech: mild expressive aphasia, no dysarthria  Follows commands     Cranial Nerves: VFF, PERRL 3mm brisk, EOMI, left proximal ICA  CTA brain/carotids: PRANAV 80% stenosis, L carotid bulb 50-60% stenosis  LDL 93, HDL  49 ( from 1/7/19)  A1C 6.2  Echo EF 55-60%, no WMA    Plan:    Ischemic stroke order set in place   Asa 325 mg daily  Lipitor 80 mh Qhs   - 160  PT

## 2019-01-16 NOTE — OCCUPATIONAL THERAPY NOTE
PT spoke with RN. Per PT note, \"Pt presenting with expressive aphasia, MRI revealing acute infarct posterior L MCA. Per workup, pt with severe right ICA and moderate left ICA stenosis. Per vascular, planning possible CEA.  Will await activity orders per Ne

## 2019-01-16 NOTE — CM/SW NOTE
Care Management/BPCI:    Met with patient at bedside to explain the BPCI/Medicare program. Patient agreed with phone f/u [facility]. BPCI/Medicare Letter and Brochure provided.  [facility] [address]

## 2019-01-16 NOTE — PROGRESS NOTES
Patient improved - some ? aphasia, no lateralizing weakness. MRI noted. CTA/Duplex both with 80% right carotid stenosis, Left 60-65% stenosis. , Discussed with daughter Briscoe Bernheim- recommend left CEA/vein patch- not sure if Thursday or Monday due to her age.  Kika Russell

## 2019-01-16 NOTE — PROGRESS NOTES
Discussed with daughter yesterday- Patient recent left CVA- resolving aphasia, Plan Left CEA for Monday 1/21. Risk/complications explained. Family aware of higher risk CVA with recent stroke for surgery.  Aware of high risk for recurrent CVA on medical dixie

## 2019-01-16 NOTE — PROGRESS NOTES
Assumed care of patient at 1. Patient alert but confused at times. Oriented to self and place. Expressive aphasia apparent when patient asked direct questions. Otherwise neuro exam is intact. VSS, v-paced on the monitor.  SPB maintained within ordered 10

## 2019-01-16 NOTE — PROGRESS NOTES
[facility]  Cardiology Progress Note    Dormerissa Boudreauxniravnicolas Patient Status:  Inpatient    1934 MRN [id number]   [facility] 7NE-A Attending Amanda Pillai MD   Hosp Day # 3 PCP Yesi Tracy     Subjective:  Still with some word find Kay. Chart reviewed. She is admitted with left side CVA in the setting of bilateral carotid stenosis.     Exam:  CV: RRR  Pulm: CTAB  GI: soft, non-tender    Plan:    1) s/p [employer] PPM  - no acute issues    2) CVA  - aspirin, atorvastatin

## 2019-01-16 NOTE — PHYSICAL THERAPY NOTE
Spoke with RN, Nidia Odell this AM. Pt presenting with expressive aphasia, MRI revealing acute infarct posterior L MCA. Per workup, pt with severe right ICA and moderate left ICA stenosis. Per vascular, planning possible CEA.  Will await activity orders per Neur

## 2019-01-16 NOTE — PLAN OF CARE
A&Ox3, disoriented to time, can state year.   Neuro checks Q4H  Expressive aphasia noted  Generalized weakness noted  LLE slightly weaker than RLE  Denies pain or discomfort  Bruising noted to R hip  Up to chair x2 assist tolerated well  Will continue to mo

## 2019-01-16 NOTE — CM/SW NOTE
Pt is current with [facility] [phone]. PT/OT to see after surgery which maybe tomorrow - waiting for Dr Isabell Daniels to decide when surgery will be.

## 2019-01-17 ENCOUNTER — APPOINTMENT (OUTPATIENT)
Dept: CT IMAGING | Facility: HOSPITAL | Age: 84
DRG: 037 | End: 2019-01-17
Attending: HOSPITALIST
Payer: MEDICARE

## 2019-01-17 LAB
ALBUMIN SERPL-MCNC: 2.6 G/DL (ref 3.1–4.5)
ALBUMIN/GLOB SERPL: 0.6 {RATIO} (ref 1–2)
ALP LIVER SERPL-CCNC: 75 U/L (ref 55–142)
ALT SERPL-CCNC: 10 U/L (ref 14–54)
ANION GAP SERPL CALC-SCNC: 5 MMOL/L (ref 0–18)
AST SERPL-CCNC: 22 U/L (ref 15–41)
BASOPHILS # BLD AUTO: 0.08 X10(3) UL (ref 0–0.1)
BASOPHILS NFR BLD AUTO: 0.7 %
BILIRUB SERPL-MCNC: 0.6 MG/DL (ref 0.1–2)
BILIRUB UR QL STRIP.AUTO: NEGATIVE
BUN BLD-MCNC: 13 MG/DL (ref 8–20)
BUN/CREAT SERPL: 17.8 (ref 10–20)
CALCIUM BLD-MCNC: 8.1 MG/DL (ref 8.3–10.3)
CHLORIDE SERPL-SCNC: 104 MMOL/L (ref 101–111)
CO2 SERPL-SCNC: 27 MMOL/L (ref 22–32)
COLOR UR AUTO: YELLOW
CREAT BLD-MCNC: 0.73 MG/DL (ref 0.55–1.02)
EOSINOPHIL # BLD AUTO: 0.2 X10(3) UL (ref 0–0.3)
EOSINOPHIL NFR BLD AUTO: 1.8 %
ERYTHROCYTE [DISTWIDTH] IN BLOOD BY AUTOMATED COUNT: 12.7 % (ref 11.5–16)
GLOBULIN PLAS-MCNC: 4.7 G/DL (ref 2.8–4.4)
GLUCOSE BLD-MCNC: 103 MG/DL (ref 65–99)
GLUCOSE BLD-MCNC: 112 MG/DL (ref 65–99)
GLUCOSE BLD-MCNC: 125 MG/DL (ref 65–99)
GLUCOSE BLD-MCNC: 193 MG/DL (ref 65–99)
GLUCOSE BLD-MCNC: 98 MG/DL (ref 70–99)
GLUCOSE UR STRIP.AUTO-MCNC: NEGATIVE MG/DL
HCT VFR BLD AUTO: 34.1 % (ref 34–50)
HGB BLD-MCNC: 11.4 G/DL (ref 12–16)
IMMATURE GRANULOCYTE COUNT: 0.04 X10(3) UL (ref 0–1)
IMMATURE GRANULOCYTE RATIO %: 0.4 %
KETONES UR STRIP.AUTO-MCNC: NEGATIVE MG/DL
LYMPHOCYTES # BLD AUTO: 1.33 X10(3) UL (ref 0.9–4)
LYMPHOCYTES NFR BLD AUTO: 11.7 %
M PROTEIN MFR SERPL ELPH: 7.3 G/DL (ref 6.4–8.2)
MCH RBC QN AUTO: 30.2 PG (ref 27–33.2)
MCHC RBC AUTO-ENTMCNC: 33.4 G/DL (ref 31–37)
MCV RBC AUTO: 90.2 FL (ref 81–100)
MONOCYTES # BLD AUTO: 1.66 X10(3) UL (ref 0.1–1)
MONOCYTES NFR BLD AUTO: 14.7 %
NEUTROPHIL ABS PRELIM: 8.01 X10 (3) UL (ref 1.3–6.7)
NEUTROPHILS # BLD AUTO: 8.01 X10(3) UL (ref 1.3–6.7)
NEUTROPHILS NFR BLD AUTO: 70.7 %
NITRITE UR QL STRIP.AUTO: POSITIVE
OSMOLALITY SERPL CALC.SUM OF ELEC: 282 MOSM/KG (ref 275–295)
PH UR STRIP.AUTO: 6 [PH] (ref 4.5–8)
PLATELET # BLD AUTO: 245 10(3)UL (ref 150–450)
POTASSIUM SERPL-SCNC: 4.3 MMOL/L (ref 3.6–5.1)
PROT UR STRIP.AUTO-MCNC: 30 MG/DL
RBC # BLD AUTO: 3.78 X10(6)UL (ref 3.8–5.1)
RBC #/AREA URNS AUTO: >10 /HPF
RED CELL DISTRIBUTION WIDTH-SD: 41.9 FL (ref 35.1–46.3)
SODIUM SERPL-SCNC: 136 MMOL/L (ref 136–144)
SP GR UR STRIP.AUTO: 1.02 (ref 1–1.03)
UROBILINOGEN UR STRIP.AUTO-MCNC: <2 MG/DL
WBC # BLD AUTO: 11.3 X10(3) UL (ref 4–13)
WBC #/AREA URNS AUTO: >50 /HPF
WBC CLUMPS UR QL AUTO: PRESENT

## 2019-01-17 PROCEDURE — 99233 SBSQ HOSP IP/OBS HIGH 50: CPT | Performed by: OTHER

## 2019-01-17 PROCEDURE — 99233 SBSQ HOSP IP/OBS HIGH 50: CPT | Performed by: HOSPITALIST

## 2019-01-17 PROCEDURE — 70450 CT HEAD/BRAIN W/O DYE: CPT | Performed by: HOSPITALIST

## 2019-01-17 RX ORDER — SODIUM CHLORIDE 9 MG/ML
INJECTION, SOLUTION INTRAVENOUS CONTINUOUS
Status: DISCONTINUED | OUTPATIENT
Start: 2019-01-17 | End: 2019-01-20

## 2019-01-17 NOTE — PROGRESS NOTES
01/17/19 1426   Clinical Encounter Type   Visited With Patient   Patient's Supportive Strategies/Resources Patient has a [employer] background. Referral To Nurse  (Patient's POA, her daughter, signed POLST form. Physician's signature requested.   Chapl

## 2019-01-17 NOTE — OCCUPATIONAL THERAPY NOTE
Attempted to see the patient for OT evaluation. Per hospitalist, pt is presenting with decline in mentation and overall presentation. Hold therapy.

## 2019-01-17 NOTE — PHYSICAL THERAPY NOTE
Attempted PT evaluation, MD requesting to hold at this time. Per MD, pt with acute changes. Will continue to follow and see when appropriate.

## 2019-01-17 NOTE — PHYSICAL THERAPY NOTE
PHYSICAL THERAPY EVALUATION - INPATIENT     Room Number: 6564/0028-U  Evaluation Date: 1/17/2019  Type of Evaluation: Initial  Physician Order: PT Eval and Treat    Presenting Problem: acute CVA  Reason for Therapy: Mobility Dysfunction and Discharge CATARACT     • COLECTOMY     • HIP REPLACEMENT SURGERY     • TOTAL HIP REPLACEMENT         HOME SITUATION  Type of Home: Independent living facility   Home Layout: Multi-level                Lives With: Alone  Drives: No     Patient Regularly Uses: Glasses blankets)?: A Lot   -   Sitting down on and standing up from a chair with arms (e.g., wheelchair, bedside commode, etc.): A Lot   -   Moving from lying on back to sitting on the side of the bed?: A Lot   How much help from another person does the patient c 1/13/2019 for aphasia, acute CVA. Pertinent comorbidities and personal factors impacting therapy include severe carotid stenosis, chronic low back pain with sciatica.   In this PT evaluation, the patient presents with the following impairments decreased st

## 2019-01-17 NOTE — PROGRESS NOTES
Brief Note:    Patient seen and examined  Patient with clinical change this morning  Patient with difficulty following commands, processing information, cannot raise lower ext  Speech not as clear.     /57 (BP Location: Right arm)   Pulse 78   Temp 98

## 2019-01-17 NOTE — PROGRESS NOTES
Discussed with Dang Eng- Patient more confused/lethargic. Discussed with daughter. PLan surgery on Monday.  CSA will cover 1/19-1/21

## 2019-01-17 NOTE — OCCUPATIONAL THERAPY NOTE
OCCUPATIONAL THERAPY EVALUATION - INPATIENT     Room Number: 8079/3313-N  Evaluation Date: 1/17/2019  Type of Evaluation: Initial  Presenting Problem: L MCA infarct, bilateral ICA stenosis, L CEA  scheduled for 1/21    Physician Order: IP Consult to Luis Miguel • High blood pressure    • Incontinence    • Muscle weakness    • Pneumonia due to organism    • Thyroid disease    • Visual impairment        Past Surgical History  Past Surgical History:   Procedure Laterality Date   • CATARACT     • COLECTOMY     • HI awareness of errors   Awareness of Deficits:  not aware of deficits  Problem Solving:  assistance required to identify errors made, assistance required to generate solutions and assistance required to implement solutions    VISION  Current Vision: wears gl Degree of Impairment: 70.42%  Standardized Score (AM-PAC Scale): 29.04  CMS Modifier (G-Code): CL    FUNCTIONAL TRANSFER ASSESSMENT  Supine to Sit : Dependent assistance  Sit to Stand: Dependent assistance(max A x 2)    Skilled Therapy Provided: . sided LE pain, impaired trunk control, decreased standing balance, and decreased standing endurance.  These deficits impact the patient’s ability to participate in ADL, instrumental activities of daily living, rest and sleep, work, leisure and social partic assist    UE Exercise Program Goal  Patient will be minimum assistance with bilateral AROM HEP (home exercise program).     Additional Goals:  Complete  Test.

## 2019-01-17 NOTE — PROGRESS NOTES
[facility]  Cardiology Progress Note    Jeff Villanueva Patient Status:  Inpatient    1934 MRN [id number]   [facility] 7NE-A Attending Luciana Epley, MD   Hosp Day # 4 PCP Lucien Pulse     Subjective:  Awake, patient more confu Assessment:  · Aphasia d/t acute CVA. MRI with small region of acute infarct in the posterior 1/3 territory of the L MCA. Measures up to 3 cm in the left parietal region.    · Carotid artery stenosis R > L , > 70% PRANAV, 50-69% left proximal ICA  · Sym

## 2019-01-17 NOTE — PROGRESS NOTES
[facility]  Progress Note     Jillian [place] Patient Status:  Inpatient    1934 MRN [id number]   [facility] 6NE-A Attending MICHELLE Victor MD   Hosp Day # 4 PCP Soco Rodrigues     Chief Complaint:  Acute confusion    S: Patient PTP  13.8   INR  1.02       No results for input(s): TROP, CK in the last 168 hours. Imaging: Imaging data reviewed in Epic.     Medications:   • Travoprost (ALEXANDR Free)  1 drop Both Eyes Nightly   • atorvastatin  40 mg Oral Nightly   • Netarsudil Dim Buckley Areas, NP,   1/17 /2019  [id number]

## 2019-01-17 NOTE — PLAN OF CARE
Assumed care at [address]. Pt A/O to person and place. Disoriented to time and situation. RA. VPaced on tele. VSS. SBP maintained within ordered parameters. Denies any pain. Safety precautions maintained. Bed alarm on.  Plan for PT to eval in the AM.  Call lig

## 2019-01-17 NOTE — PROGRESS NOTES
[id number] [facility] Progress Note    Ryann Liyah Kay Patient Status:  Inpatient    1934 MRN [id number]   [facility] 7NE-A Attending Stuart Forbes MD   Hosp Day # 4 PCP Mary Riggs         Neurology Attending note      I Discussed with Hospitalist earlier this AM, that patient was not doing as well today and had increased confusion with weakness. CT brain ordered at that time. Patient back from CT and eating breakfast.  Discussed patient's current exam with nurse. TP 7.3 01/17/2019    AST 22 01/17/2019    ALT 10 01/17/2019    PGLU 103 01/17/2019       Diagnostics:  1/17/2019 CT Brain  Stable chronic appearance. 1/15/2019 MRI Brain   Small region of acute infarct in the posterior 1/3 territory of the left MCA.

## 2019-01-18 ENCOUNTER — APPOINTMENT (OUTPATIENT)
Dept: GENERAL RADIOLOGY | Facility: HOSPITAL | Age: 84
DRG: 037 | End: 2019-01-18
Attending: HOSPITALIST
Payer: MEDICARE

## 2019-01-18 ENCOUNTER — APPOINTMENT (OUTPATIENT)
Dept: MRI IMAGING | Facility: HOSPITAL | Age: 84
DRG: 037 | End: 2019-01-18
Attending: PHYSICIAN ASSISTANT
Payer: MEDICARE

## 2019-01-18 LAB
GLUCOSE BLD-MCNC: 106 MG/DL (ref 65–99)
GLUCOSE BLD-MCNC: 119 MG/DL (ref 65–99)
GLUCOSE BLD-MCNC: 130 MG/DL (ref 65–99)
GLUCOSE BLD-MCNC: 131 MG/DL (ref 65–99)

## 2019-01-18 PROCEDURE — 70551 MRI BRAIN STEM W/O DYE: CPT | Performed by: PHYSICIAN ASSISTANT

## 2019-01-18 PROCEDURE — 95819 EEG AWAKE AND ASLEEP: CPT | Performed by: OTHER

## 2019-01-18 PROCEDURE — 71045 X-RAY EXAM CHEST 1 VIEW: CPT | Performed by: HOSPITALIST

## 2019-01-18 PROCEDURE — 99233 SBSQ HOSP IP/OBS HIGH 50: CPT | Performed by: HOSPITALIST

## 2019-01-18 PROCEDURE — 99233 SBSQ HOSP IP/OBS HIGH 50: CPT | Performed by: OTHER

## 2019-01-18 NOTE — PLAN OF CARE
Assumed care at 0700. Patient A/O to person. Disoriented to place, time, and situation. More awake and alert than previous day. Patient's VSS. VPaced on tele. RA. SBP maintained within ordered parameters. EEG Abnormal. Seizure precautions in place.   Samina Claudio

## 2019-01-18 NOTE — PHYSICAL THERAPY NOTE
PHYSICAL THERAPY TREATMENT NOTE - INPATIENT    Room Number: 4840/2340-M     Session: 1   Number of Visits to Meet Established Goals: 6    Presenting Problem: acute CVA     History related to current admission:      Pt is 80year old female admitted on 1/1 SUBJECTIVE  Pt requiring increased time to respond, more alert this date    Patient’s self-stated goal is not stated    OBJECTIVE  Precautions: Cardiac;Bed/chair alarm( to 160, pacemaker 1/7)    WEIGHT BEARING RESTRICTION  Weight Bearing Restr stand x2 with max A x 2, bed elevated. Pt requiring manual assistance for R hand placement on RW, episodes of R hand sliding off walker due to inattention.  Pt stood each attempt with max A x 2, unable to attain full standing but able to clear bottom from s demonstrate supine - sit EOB @ level: maximum assistance      Goal #2 Patient is able to demonstrate transfers Sit to/from Stand at assistance level: maximum assistance      Goal #3 Patient is able to ambulate 2 feet with assist device: walker - rolling at

## 2019-01-18 NOTE — CM/SW NOTE
Pt having worsening AMS. PT/OT now recommending MAI. Spoke with pt's dtr Aj Marcos about MAI placement. She said pt has been to [facility] in the past and would like her to go back to [facility]. Referral made to [facility] via ecin. DON screen requested.   Pt

## 2019-01-18 NOTE — PLAN OF CARE
Assumed care at 0700. Patient A/O to person. Disoriented to place, time, and situation. Patient experiencing clinical change this morning having difficulty processing information and following commands.   Patient speech clearness intermittent and unable

## 2019-01-18 NOTE — OCCUPATIONAL THERAPY NOTE
OCCUPATIONAL THERAPY TREATMENT NOTE - INPATIENT     Room Number: 5720/4733-H  Session: 1   Number of Visits to Meet Established Goals: 7    Presenting Problem: L MCA infarct, bilateral ICA stenosis, L CEA  scheduled for 1/21    History related to current a disease    • Visual impairment        Past Surgical History  Past Surgical History:   Procedure Laterality Date   • CATARACT     • COLECTOMY     • HIP REPLACEMENT SURGERY     • TOTAL HIP REPLACEMENT         SUBJECTIVE  \"Oh, wow, where? \" When OT asked the spread it using both hands, pt started to raise her L hand. However, pt did not seem to acknowledge her R hand until OT provided 2 cueing to turn her head to look at her R hand. Min A to turn her head. Max A x 2 to stand. Attempted twice to stand.  Unable education;Patient/Family training;Equipment eval/education; Neuromuscluar reeducation; Compensatory technique education  Rehab Potential : Good  Frequency (Obs): Daily      OT Goals:     ADL Goals   Patient will perform grooming: with min assist and while in

## 2019-01-18 NOTE — PROCEDURES
[address] [facility] in [place]  in affiliation with [facility]  [address]  [address]  [phone]  Fax [phone]    Name: Lacho Mullen  6/21/1934  Date of Rito 1200 MG Oral Cap Take 1,200 mg by mouth daily. Disp:  Rfl:    aspirin 81 MG Oral Tab Take 81 mg by mouth daily. Disp:  Rfl:    Cholecalciferol (VITAMIN D3) 1000 units Oral Cap Take 1 tablet by mouth daily.  Disp:  Rfl:    Ferrous Sulfate 325 (65 Fe) MG Or

## 2019-01-18 NOTE — PLAN OF CARE
Assumed care at [address]. AOx1 very drowsy/lethargic. Unable to follow commands. Straight cathed to obtain UA. Notified UA result to Dr. Fady Rodriguez. New order received. Continue IV fluid/abx. Bed alarm on for safety. Call light within reach.     Impaired Activi

## 2019-01-18 NOTE — PROGRESS NOTES
[facility]  Progress Note     Bina Lyles Patient Status:  Inpatient    1934 MRN [id number]   [facility] 6NE-A Attending MICHELLE Victor MD   Hosp Day # 5 PCP Tony De Leon     Chief Complaint:  Acute confusion    S: Patient Estimated Creatinine Clearance: 47.5 mL/min (based on SCr of 0.73 mg/dL). Recent Labs   Lab  01/13/19   0930   PTP  13.8   INR  1.02       No results for input(s): TROP, CK in the last 168 hours. Imaging: Imaging data reviewed in Epic.     Me nares - Bactroban bid      Quality:  · DVT Prophylaxis: SCDs lovenox   · CODE status: DNR  · Daly: No    Plan of care:   MRI today  Hold off AED per neuro  Cont abx  Follow cx   Pt/ot eval   Seen this am  Needs repeated command to complete task- will need noted  Fever    Plan:  Check CXR and BC  UA (+) > already on abx  Continue present management  PTapearl JI

## 2019-01-18 NOTE — PROGRESS NOTES
[id number] [facility] Progress Note    Maggie Villanueva Patient Status:  Inpatient    1934 MRN [id number]   [facility] 7NE-A Attending Rualito Montes De Oca MD   [address] Day # 5 PCP Shahram Zheng         Subjective:  Maggie Villanueva Lipids: LDL 93, HDL 49, TGs 94  UA with +UTI    Assessment:   L MCA infarct  Carotid stenosis  Symptomatic AV block s/p PPM  HTN  UTI    Plan:  Ischemic order set  ASA 325mg daily (home dose 81mg)  Lipitor 40mg qhs (not on home statin)  DVT prophylaxis, Lo CT Reading 1/17/2019: There are no abnormal extraaxial fluid collections. There is no midline shift. There are no acute appearing intraparenchymal brain abnormalities. There is nothing specific for acute territorial infarct.   There is no hemorrhage or Non Face to Face CPT code 01009/02901 applies as documented above    High risk    (   ) Drug monitoring    (   ) PCA    (   ) Organ failure    (   ) De-escalation of treatment - DNR    (   ) Acute neurologic changes    (   ) Others: New Rx    (   ) ICU >35

## 2019-01-18 NOTE — PROGRESS NOTES
Updated by RN of  MRI results.  Pt neuro status stable   Dr Estrellita Sheppard updated- he will review scans  No change in meds  MRI FINDINGS:       There has been increase in the size of previously demonstrated infarct, when compared with MRI from 1/15/2019, withi

## 2019-01-18 NOTE — ACP (ADVANCE CARE PLANNING)
filed completed POLST form into patient's electronic Resuscitation Wishes/POLST medical record, as received. Original copy to be given to patient. Notified the RN.  Joseph Chi

## 2019-01-19 LAB
ALBUMIN SERPL-MCNC: 2.3 G/DL (ref 3.1–4.5)
ALBUMIN/GLOB SERPL: 0.5 {RATIO} (ref 1–2)
ALP LIVER SERPL-CCNC: 96 U/L (ref 55–142)
ALT SERPL-CCNC: 25 U/L (ref 14–54)
ANION GAP SERPL CALC-SCNC: 8 MMOL/L (ref 0–18)
AST SERPL-CCNC: 31 U/L (ref 15–41)
BASOPHILS # BLD AUTO: 0.04 X10(3) UL (ref 0–0.1)
BASOPHILS NFR BLD AUTO: 0.4 %
BILIRUB SERPL-MCNC: 0.4 MG/DL (ref 0.1–2)
BUN BLD-MCNC: 11 MG/DL (ref 8–20)
BUN/CREAT SERPL: 18.6 (ref 10–20)
CALCIUM BLD-MCNC: 7.4 MG/DL (ref 8.3–10.3)
CHLORIDE SERPL-SCNC: 108 MMOL/L (ref 101–111)
CO2 SERPL-SCNC: 23 MMOL/L (ref 22–32)
CREAT BLD-MCNC: 0.59 MG/DL (ref 0.55–1.02)
EOSINOPHIL # BLD AUTO: 0.05 X10(3) UL (ref 0–0.3)
EOSINOPHIL NFR BLD AUTO: 0.5 %
ERYTHROCYTE [DISTWIDTH] IN BLOOD BY AUTOMATED COUNT: 12.5 % (ref 11.5–16)
GLOBULIN PLAS-MCNC: 5 G/DL (ref 2.8–4.4)
GLUCOSE BLD-MCNC: 103 MG/DL (ref 65–99)
GLUCOSE BLD-MCNC: 115 MG/DL (ref 70–99)
GLUCOSE BLD-MCNC: 119 MG/DL (ref 65–99)
GLUCOSE BLD-MCNC: 131 MG/DL (ref 65–99)
GLUCOSE BLD-MCNC: 95 MG/DL (ref 65–99)
HCT VFR BLD AUTO: 33.5 % (ref 34–50)
HGB BLD-MCNC: 10.8 G/DL (ref 12–16)
IMMATURE GRANULOCYTE COUNT: 0.05 X10(3) UL (ref 0–1)
IMMATURE GRANULOCYTE RATIO %: 0.5 %
LYMPHOCYTES # BLD AUTO: 1.17 X10(3) UL (ref 0.9–4)
LYMPHOCYTES NFR BLD AUTO: 11.1 %
M PROTEIN MFR SERPL ELPH: 7.3 G/DL (ref 6.4–8.2)
MCH RBC QN AUTO: 29.6 PG (ref 27–33.2)
MCHC RBC AUTO-ENTMCNC: 32.2 G/DL (ref 31–37)
MCV RBC AUTO: 91.8 FL (ref 81–100)
MONOCYTES # BLD AUTO: 0.99 X10(3) UL (ref 0.1–1)
MONOCYTES NFR BLD AUTO: 9.4 %
NEUTROPHIL ABS PRELIM: 8.22 X10 (3) UL (ref 1.3–6.7)
NEUTROPHILS # BLD AUTO: 8.22 X10(3) UL (ref 1.3–6.7)
NEUTROPHILS NFR BLD AUTO: 78.1 %
OSMOLALITY SERPL CALC.SUM OF ELEC: 288 MOSM/KG (ref 275–295)
PLATELET # BLD AUTO: 279 10(3)UL (ref 150–450)
POTASSIUM SERPL-SCNC: 3.9 MMOL/L (ref 3.6–5.1)
RBC # BLD AUTO: 3.65 X10(6)UL (ref 3.8–5.1)
RED CELL DISTRIBUTION WIDTH-SD: 42 FL (ref 35.1–46.3)
SODIUM SERPL-SCNC: 139 MMOL/L (ref 136–144)
WBC # BLD AUTO: 10.5 X10(3) UL (ref 4–13)

## 2019-01-19 PROCEDURE — 99233 SBSQ HOSP IP/OBS HIGH 50: CPT | Performed by: HOSPITALIST

## 2019-01-19 PROCEDURE — 99233 SBSQ HOSP IP/OBS HIGH 50: CPT | Performed by: OTHER

## 2019-01-19 NOTE — PLAN OF CARE
Assumed care @ 0700. Pt a/o x1 to person, VSS. Tele SR. No acute respiratory distress noted. Denies any pain. Pt sit in chair for lunch, by [employer] lift. (+) BM. Pt bed-bathed per PCT. Plan for LCEA on Monday by NABIL Tapia RN updated

## 2019-01-19 NOTE — PLAN OF CARE
Assumed care @ 1900. Alert to self, RA, VSS, V-Paced on tele. Seizure precautions maintained. Patient is restless. Required reassurance multiple times a night. Call light within reach, needs attended to. Will continue to monitor.     Impaired Activitie

## 2019-01-19 NOTE — PROGRESS NOTES
[facility]  Progress Note     [place] Patient Status:  Inpatient    1934 MRN [id number]   [facility] 6NE-A Attending MICHELLE Victor MD   Hosp Day # 6 PCP Marino Roy     Chief Complaint:  Acute CVA, carotid stenosis    S 58.7 mL/min (based on SCr of 0.59 mg/dL). Recent Labs   Lab  01/13/19   0930   PTP  13.8   INR  1.02       No results for input(s): TROP, CK in the last 168 hours. Imaging: Imaging data reviewed in Epic.     Medications:   • cefTRIAXone  1 g Unique Norris

## 2019-01-19 NOTE — PROGRESS NOTES
[id number] Yudi Simpson Neurology Progress Note    Jostin Villanueva Patient Status:  Inpatient    1934 MRN [id number]   [facility] 7NE-A Attending Viktoria Sue MD   [facility] Day # 6 PCP Sebastián Castillo         Subjective:  Jostin Villanueva Eyes Nightly   • atorvastatin  40 mg Oral Nightly   • Netarsudil Dimesylate  1 drop Both Eyes Nightly   • brimonidine Tartrate  1 drop Both Eyes Q8H [facility] custod[facility]   • ferrous sulfate  325 mg Oral Daily with breakfast   • aspirin  300 mg Rectal Daily    Or   • aspiri updated, plan is still for left CEA on Monday        Sharon Chacon, [address]  1/19/2019  9:17 AM  Spectra [id number]      NEUROLOGY   Attending Addendum Loretha Gilford Notes:    I have seen and evaluated the patient with my housestaff or i

## 2019-01-20 ENCOUNTER — ANESTHESIA EVENT (OUTPATIENT)
Dept: CARDIAC SURGERY | Facility: HOSPITAL | Age: 84
DRG: 037 | End: 2019-01-20
Payer: MEDICARE

## 2019-01-20 LAB
BILIRUB UR QL STRIP.AUTO: NEGATIVE
COLOR UR AUTO: YELLOW
GLUCOSE BLD-MCNC: 105 MG/DL (ref 65–99)
GLUCOSE BLD-MCNC: 109 MG/DL (ref 65–99)
GLUCOSE BLD-MCNC: 121 MG/DL (ref 65–99)
GLUCOSE BLD-MCNC: 96 MG/DL (ref 65–99)
GLUCOSE UR STRIP.AUTO-MCNC: NEGATIVE MG/DL
KETONES UR STRIP.AUTO-MCNC: NEGATIVE MG/DL
NITRITE UR QL STRIP.AUTO: NEGATIVE
PH UR STRIP.AUTO: 6 [PH] (ref 4.5–8)
PROT UR STRIP.AUTO-MCNC: 100 MG/DL
RBC UR QL AUTO: NEGATIVE
SP GR UR STRIP.AUTO: 1.02 (ref 1–1.03)
UROBILINOGEN UR STRIP.AUTO-MCNC: <2 MG/DL
WBC #/AREA URNS AUTO: >50 /HPF
WBC CLUMPS UR QL AUTO: PRESENT

## 2019-01-20 PROCEDURE — 99232 SBSQ HOSP IP/OBS MODERATE 35: CPT | Performed by: OTHER

## 2019-01-20 PROCEDURE — 99233 SBSQ HOSP IP/OBS HIGH 50: CPT | Performed by: HOSPITALIST

## 2019-01-20 RX ORDER — LISINOPRIL 2.5 MG/1
2.5 TABLET ORAL DAILY
Status: DISCONTINUED | OUTPATIENT
Start: 2019-01-20 | End: 2019-01-25

## 2019-01-20 NOTE — PROGRESS NOTES
[address]  Neurology Progress Note    Yesika Villanueva Patient Status:  Inpatient    1934 MRN [id number]   [facility] 7NE-A Attending Darya Tate MD   [facility] Day # 7 PCP Elijah Boast         Subjective:  Yesika Villanueva COPD with asthma ([facility] [address]     Syncope and collapse     Azotemia     Hyperglycemia     Laceration of scalp, initial encounter     Tetanus-diphtheria (Td) vaccination     Anemia, unspecified type     Complete heart block (HCC)     Encephalopathy, ISCHEMIC     A no CP or SOB    Current MEDS:  • lisinopril  2.5 mg Oral Daily   • Travoprost (ALEXANDR Free)  1 drop Both Eyes Nightly   • atorvastatin  40 mg Oral Nightly   • Netarsudil Dimesylate  1 drop Both Eyes Nightly   • brimonidine Tartrate  1 drop Both Eyes Q8H [address] documented above    High risk    (   ) Drug monitoring    (   ) PCA    (   ) Organ failure    (   ) De-escalation of treatment - DNR    (   ) Acute neurologic changes    (   ) Others: New Rx    (   ) ICU >35 minutes total time   Available within moments ca

## 2019-01-20 NOTE — PROGRESS NOTES
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

## 2019-01-20 NOTE — PLAN OF CARE
Assumed care at [address]. Pt confused. Follows simple commands. VSS, NSR per tele. RA. Tylenol prn given for gen pain w/ relief. Kept clean and dry. Meds given per MAR. IVF infusing per order. Will continue to monitor.   Impaired Activities of Daily Livin

## 2019-01-20 NOTE — PROGRESS NOTES
ANN HOSPITALIST  Progress Note     Layne Peoples Patient Status:  Inpatient    1934 MRN [id number]   [facility] 6NE-A Attending MICHELLE Victor MD   Hosp Day # 7 PCP Wolfgang Brunner     Chief Complaint:  Acute CVA, carotid stenosis    S on SCr of 0.59 mg/dL). Recent Labs   Lab  01/13/19   0930   PTP  13.8   INR  1.02       No results for input(s): TROP, CK in the last 168 hours. Imaging: Imaging data reviewed in Epic.     Medications:   • cefTRIAXone  1 g Intravenous Q24H   • Zohra Montoya

## 2019-01-20 NOTE — CONSULTS
INFECTIOUS DISEASE CONSULTATION    Remona Schlatter [facility] Patient Status:  Inpatient    1934 MRN [id number]   [facility] 7NE-A Attending Johny Castaneda MD   Hosp Day # 7 RADHA Jaffe puff, 2 puff, Inhalation, Q4H PRN  •  brimonidine Tartrate (ALPHAGAN) 0.2 % ophthalmic solution 1 drop, 1 drop, Both Eyes, Q8H SERINA  •  docusate sodium (COLACE) cap 100 mg, 100 mg, Oral, Daily PRN  •  ferrous sulfate EC tab 325 mg, 325 mg, Oral, Daily with daily. (Patient taking differently: Take 2.5 mg by mouth daily.  ) Disp: 30 tablet Rfl: 3   gabapentin 600 MG Oral Tab Take 600 mg by mouth 3 (three) times daily.  Disp:  Rfl:    denosumab 60 MG/ML Subcutaneous Solution Inject 60 mg into the skin every 6 (s [13-20] 13  BP: (156-188)/(67-95) 181/82  HEENT: Moist mucous membranes. Extraocular muscles are intact. Neck: No lymphadenopathy. supple, no masses  Respiratory: Clear to auscultation bilaterally. No wheezes. No rhonchi.   Cardiovascular: S1, S2.  Regular Sensitive            Problem list reviewed:  Patient Active Problem List:     COPD with asthma ([facility] [address]     Syncope and collapse     Azotemia     Hyperglycemia     Laceration of scalp, initial encounter     Tetanus-diphtheria (Td) vaccination     Anemia, unsp

## 2019-01-20 NOTE — PLAN OF CARE
Assumed care @ 0700. Pt a/o x1 to person, VSS. Neuro checks q4, pt more alert today, pt follows some simple commands. Rt leg weakness no change. No new deficits. Tele SR w/ occasional AV pacing. No acute respiratory distress noted.   Denies any p

## 2019-01-20 NOTE — ANESTHESIA PREPROCEDURE EVALUATION
PRE-OP EVALUATION    Patient Name: Jes Vincent    Pre-op Diagnosis: carotid stenosis, stroke    Procedure(s):  left carotid endarterectomy with vein patch                   IP   [address]  [facility]    Surgeon(s) and Role:     Bruce Stubbs MD - Primary Q4H PRN   Or      acetaminophen (TYLENOL) 650 MG rectal suppository 650 mg 650 mg Rectal Q4H PRN   aspirin tab 325 mg 325 mg Oral Daily   bisacodyl (DULCOLAX) rectal suppository 10 mg 10 mg Rectal Daily PRN   FLEET ENEMA (FLEET) 7-19 GM/118ML enema 133 mL Endo/Other           (+) hypothyroidism                       Pulmonary        (+) COPD and mild  COPD not requiring home oxygen.     (-) shortness of breath            Neuro/Psych  Comment: Slowing on EEG  acute posterior infarct on 1/15 secondary to carot Airway      Mallampati: II  Mouth opening: 3 FB  TM distance: < 4 cm  Neck ROM: full Cardiovascular  Comment: paced    Rhythm: regular  Rate: normal     Dental    No notable dental history.          Pulmonary      Breath sounds clear to auscultation

## 2019-01-21 ENCOUNTER — ANESTHESIA (OUTPATIENT)
Dept: CARDIAC SURGERY | Facility: HOSPITAL | Age: 84
DRG: 037 | End: 2019-01-21
Payer: MEDICARE

## 2019-01-21 LAB
GLUCOSE BLD-MCNC: 107 MG/DL (ref 65–99)
GLUCOSE BLD-MCNC: 125 MG/DL (ref 65–99)
GLUCOSE BLD-MCNC: 133 MG/DL (ref 65–99)
GLUCOSE BLD-MCNC: 137 MG/DL (ref 65–99)

## 2019-01-21 PROCEDURE — 99232 SBSQ HOSP IP/OBS MODERATE 35: CPT | Performed by: HOSPITALIST

## 2019-01-21 PROCEDURE — 99233 SBSQ HOSP IP/OBS HIGH 50: CPT | Performed by: OTHER

## 2019-01-21 RX ORDER — CHLORHEXIDINE GLUCONATE 4 G/100ML
30 SOLUTION TOPICAL DAILY
Status: DISCONTINUED | OUTPATIENT
Start: 2019-01-21 | End: 2019-01-23

## 2019-01-21 NOTE — PROGRESS NOTES
Notes reviewed. Discussed with Dr. Kee Betts. Lengthy discussion with daughter. Patient with aphasia- improving from last 1/17. Neurologically at that time not responding as well- Found to have UTI- although afebrile was > 100,000 colonies.  Cu

## 2019-01-21 NOTE — PLAN OF CARE
Assumed patient care at 0730. BP elevated- discussed with neurology APN- okay for parameters for now.    Alert to self only, intermittently follows commands appropriately   Neuro assessment limited   No complaints of pain   Vanco for UTI   Endarectomy wit

## 2019-01-21 NOTE — PROGRESS NOTES
ANN HOSPITALIST  Progress Note     Jessicamadonnaольга Rivera Patient Status:  Inpatient    1934 MRN [id number]   [facility] 6NE-A Attending MICHELLE Victor MD   Hosp Day # 8 PCP Serge Ya     Chief Complaint:  Acute CVA, carotid stenosis    S brimonidine Tartrate  1 drop Both Eyes Q8H [address]   • ferrous sulfate  325 mg Oral Daily with breakfast   • aspirin  325 mg Oral Daily   • enoxaparin  40 mg Subcutaneous Nightly       ASSESSMENT / PLAN:     1.  Toxic metabolic encephalopathy d/t acute CVA with

## 2019-01-21 NOTE — PROGRESS NOTES
[facility]                INFECTIOUS DISEASE PROGRESS NOTE    Michelle Villanueva Patient Status:  Inpatient    1934 MRN [id number]   [facility] 7NE-A Attending Pradeep Gimenez MD   [facility] Day # 8 PCP Mita Escobar     Antibiotics Range    Urine Culture >100,000 CFU/ML Staphylococcus aureus, MRSA (A) N/A       Susceptibility    Staphylococcus aureus, MRSA -  (no method available)     Cefazolin  Resistant      Gentamicin <=0.5 Sensitive      Levofloxacin >=8 Resistant      Nitrofuran

## 2019-01-21 NOTE — PLAN OF CARE
Assumed care at [address]. Pt confused. Follows some simple commands. VSS, NSR per tele. RA. Kept clean and dry. Meds given per MAR. On IV abx. Plan: LCEA in am.  Will continue to monitor.   Impaired Activities of Daily Living    • Achieve highest/safest le

## 2019-01-21 NOTE — PROGRESS NOTES
[address] Yudi Simpson Neurology Progress Note    [facility] Patient Status:  Inpatient    1934 MRN [id number]   [facility] 7NE-A Attending Shiva Novak MD   Hosp Day # 8 PCP Sasha Chamberlain     CC: Left sided weakness/Aphasia of the left MCA. Extends toward the posterior aspect of the left insula.      1/13/2019 CTA Brain and Neck  Approximately 80% stenosis secondary to irregular eccentric plaque of the right proximal ICA.   Approximately 50-60% stenosis involving the left car meds;  mg, lipitor  CV surgery is following re: CEA  Stroke education given  Stroke risk factors management  Minimal sedatives/narcotics  DVT prophylaxis  Follow up with Dr Jefry Keller after CEA  I discussed with patient/family at length regarding all

## 2019-01-21 NOTE — CM/SW NOTE
MMN accepted pt for MAI. Pt will have surgery with Dr New Alexis now on [facility].

## 2019-01-22 LAB
ANION GAP SERPL CALC-SCNC: 5 MMOL/L (ref 0–18)
BASOPHILS # BLD AUTO: 0.09 X10(3) UL (ref 0–0.1)
BASOPHILS NFR BLD AUTO: 1 %
BUN BLD-MCNC: 16 MG/DL (ref 8–20)
BUN/CREAT SERPL: 23.9 (ref 10–20)
CALCIUM BLD-MCNC: 8 MG/DL (ref 8.3–10.3)
CHLORIDE SERPL-SCNC: 102 MMOL/L (ref 101–111)
CO2 SERPL-SCNC: 24 MMOL/L (ref 22–32)
CREAT BLD-MCNC: 0.67 MG/DL (ref 0.55–1.02)
EOSINOPHIL # BLD AUTO: 0.23 X10(3) UL (ref 0–0.3)
EOSINOPHIL NFR BLD AUTO: 2.4 %
ERYTHROCYTE [DISTWIDTH] IN BLOOD BY AUTOMATED COUNT: 12.5 % (ref 11.5–16)
GLUCOSE BLD-MCNC: 104 MG/DL (ref 65–99)
GLUCOSE BLD-MCNC: 106 MG/DL (ref 65–99)
GLUCOSE BLD-MCNC: 108 MG/DL (ref 65–99)
GLUCOSE BLD-MCNC: 109 MG/DL (ref 70–99)
GLUCOSE BLD-MCNC: 198 MG/DL (ref 65–99)
HCT VFR BLD AUTO: 38.3 % (ref 34–50)
HGB BLD-MCNC: 12.5 G/DL (ref 12–16)
IMMATURE GRANULOCYTE COUNT: 0.03 X10(3) UL (ref 0–1)
IMMATURE GRANULOCYTE RATIO %: 0.3 %
LYMPHOCYTES # BLD AUTO: 1.87 X10(3) UL (ref 0.9–4)
LYMPHOCYTES NFR BLD AUTO: 19.9 %
MCH RBC QN AUTO: 28.9 PG (ref 27–33.2)
MCHC RBC AUTO-ENTMCNC: 32.6 G/DL (ref 31–37)
MCV RBC AUTO: 88.7 FL (ref 81–100)
MONOCYTES # BLD AUTO: 1.1 X10(3) UL (ref 0.1–1)
MONOCYTES NFR BLD AUTO: 11.7 %
NEUTROPHIL ABS PRELIM: 6.08 X10 (3) UL (ref 1.3–6.7)
NEUTROPHILS # BLD AUTO: 6.08 X10(3) UL (ref 1.3–6.7)
NEUTROPHILS NFR BLD AUTO: 64.7 %
OSMOLALITY SERPL CALC.SUM OF ELEC: 274 MOSM/KG (ref 275–295)
PLATELET # BLD AUTO: 381 10(3)UL (ref 150–450)
POTASSIUM SERPL-SCNC: 4.5 MMOL/L (ref 3.6–5.1)
RBC # BLD AUTO: 4.32 X10(6)UL (ref 3.8–5.1)
RED CELL DISTRIBUTION WIDTH-SD: 40.9 FL (ref 35.1–46.3)
SODIUM SERPL-SCNC: 131 MMOL/L (ref 136–144)
WBC # BLD AUTO: 9.4 X10(3) UL (ref 4–13)

## 2019-01-22 PROCEDURE — 99233 SBSQ HOSP IP/OBS HIGH 50: CPT | Performed by: OTHER

## 2019-01-22 PROCEDURE — 99232 SBSQ HOSP IP/OBS MODERATE 35: CPT | Performed by: HOSPITALIST

## 2019-01-22 RX ORDER — CEFAZOLIN SODIUM/WATER 2 G/20 ML
2 SYRINGE (ML) INTRAVENOUS ONCE
Status: DISCONTINUED | OUTPATIENT
Start: 2019-01-23 | End: 2019-01-23

## 2019-01-22 RX ORDER — POLYETHYLENE GLYCOL 3350 17 G/17G
17 POWDER, FOR SOLUTION ORAL DAILY
Status: DISCONTINUED | OUTPATIENT
Start: 2019-01-22 | End: 2019-01-23

## 2019-01-22 RX ORDER — SODIUM CHLORIDE 9 MG/ML
INJECTION, SOLUTION INTRAVENOUS CONTINUOUS
Status: DISCONTINUED | OUTPATIENT
Start: 2019-01-22 | End: 2019-01-24

## 2019-01-22 NOTE — PROGRESS NOTES
[facility]                INFECTIOUS DISEASE PROGRESS NOTE    Tricia Villanueva Patient Status:  Inpatient    1934 MRN [id number]   [facility] 7NE-A Attending Dalila Ahumada, MD   [facility] Day # 9 PCP Marino Roy     Antibiotics CFU/ML Staphylococcus aureus, MRSA (A) N/A       Susceptibility    Staphylococcus aureus, MRSA -  (no method available)     Cefazolin  Resistant      Gentamicin <=0.5 Sensitive      Levofloxacin >=8 Resistant      Nitrofurantoin <=16 Sensitive      Oxacill

## 2019-01-22 NOTE — OCCUPATIONAL THERAPY NOTE
OCCUPATIONAL THERAPY TREATMENT NOTE - INPATIENT     Room Number: 6660/1674-Y  Session: 2   Number of Visits to Meet Established Goals: 7    Presenting Problem: L MCA infarct, bilateral ICA stenosis, L CEA  scheduled for 1/21    History related to current a • Cancer [facility])    • Cataract    • Depression    • Essential hypertension    • Glaucoma    • Hearing impairment    • High blood pressure    • Incontinence    • Muscle weakness    • Pneumonia due to organism    • Thyroid disease    • Visual impairment for RW management as pt unable to adequately advance RW without assist, max verbal and mod tactile cues. Pt quickly sat with little warning, and unable to verbalize reason for need to sit.  Pt able to reposition in chair with Ankur and significant verbal cue body dressing:  with min assist and while at edge of bed     Functional Transfer Goals  Patient will transfer to bedside commode:  with max assist     UE Exercise Program Goal  Patient will be minimum assistance with bilateral AROM HEP (home exercise progr

## 2019-01-22 NOTE — PROGRESS NOTES
[id number] Yudi Simpson Neurology Progress Note    Kwame Villanueva Patient Status:  Inpatient    1934 MRN [id number]   [facility] 7NE-A Attending Ghazal Rizvi, [address] [facility] [address] Day # 9 PCP Hussein Garsia         Subjective:  Kwame Villanueva Travoprost (ALEXANDR Free)  1 drop Both Eyes Nightly   • atorvastatin  40 mg Oral Nightly   • Netarsudil Dimesylate  1 drop Both Eyes Nightly   • brimonidine Tartrate  1 drop Both Eyes Q8H [address]   • ferrous sulfate  325 mg Oral Daily with breakfast   • aspirin  3 Addendum:  I have seen patient independently, reviewed history, labs and imaging, and agree with above note with following additions:  S:aphasia  O: /74 (BP Location: Left arm)   Pulse 70   Temp 97.8 °F (36.6 °C) (Oral)   Resp 16   Ht 63\"   Wt 135 l

## 2019-01-22 NOTE — PLAN OF CARE
Assumed patient care 0730. Patient alert, confused. Freq. Reorientation provided. Cont with expressive/receptive aphasia. Neuro assessment limited, intermittently follows commands. Up to chair during shift. Tolerating diet. Incontinent and briefed.  Korey Rothman

## 2019-01-22 NOTE — PROGRESS NOTES
ANN HOSPITALIST  Progress Note     Courtney Curran Patient Status:  Inpatient    1934 MRN [id number]   [facility] 6NE-A Attending MICHELLE Victor MD   Hosp Day # 9 PCP Jerome Estrada     Chief Complaint:  Acute CVA, carotid stenosis    S • Travoprost (ALEXANDR Free)  1 drop Both Eyes Nightly   • atorvastatin  40 mg Oral Nightly   • Netarsudil Dimesylate  1 drop Both Eyes Nightly   • brimonidine Tartrate  1 drop Both Eyes Q8H [facility]   • ferrous sulfate  325 mg Oral Daily with breakfast   • aspiri not move / raise Ginny Garcia for me    Recent Labs   Lab  01/17/19   0913  01/19/19   0528  01/22/19   0532   RBC  3.78*  3.65*  4.32   HGB  11.4*  10.8*  12.5   HCT  34.1  33.5*  38.3   MCV  90.2  91.8  88.7   MCH  30.2  29.6  28.9   MCHC  33.4  32.2  32.6   RDW

## 2019-01-22 NOTE — PROGRESS NOTES
[facility]  Cardiology Progress Note    Subjective:  Patient apears to be aphasic, limited subjective health assessment. Patient denies any current chest pain or shortness of breath.     Objective:  /74 (BP Location: Left arm)   Pulse 72   Temp 9 Plan:  · Plan for L CEA with Dr. Mary Carter tomorrow    · Continue ASA, Lipitor    JUDY Callahan  1/22/2019  9:27 AM    Patient seen and examined    Neuro status improving; CV status ok; plan CEA in am    Judy Cespdees MD [facility]

## 2019-01-22 NOTE — PROGRESS NOTES
Seen with daughter. Still significant aphasia/ right leg weakness.  Plan Left CEA/vein patch in AM- patient and daughter aware of risk/complication- death, MI, bleeding/ infection, cranial nerve injury, persistent neurologic weakness/ reperfusion syndrome

## 2019-01-22 NOTE — PLAN OF CARE
Patient alert and oriented to self at most. Confused with expressive and receptive aphasia. Patient prefers to sleep in chair. Meds given per MAR. Vital signs stable. Neuro checks Q4 but not accurate due to unable to follow commands.  Frequent reorientation

## 2019-01-23 LAB
ANION GAP SERPL CALC-SCNC: 5 MMOL/L (ref 0–18)
ANION GAP SERPL CALC-SCNC: 5 MMOL/L (ref 0–18)
ATRIAL RATE: 70 BPM
BUN BLD-MCNC: 11 MG/DL (ref 8–20)
BUN BLD-MCNC: 12 MG/DL (ref 8–20)
BUN/CREAT SERPL: 19.6 (ref 10–20)
BUN/CREAT SERPL: 20 (ref 10–20)
CALCIUM BLD-MCNC: 7.3 MG/DL (ref 8.3–10.3)
CALCIUM BLD-MCNC: 7.6 MG/DL (ref 8.3–10.3)
CHLORIDE SERPL-SCNC: 106 MMOL/L (ref 101–111)
CHLORIDE SERPL-SCNC: 106 MMOL/L (ref 101–111)
CO2 SERPL-SCNC: 28 MMOL/L (ref 22–32)
CO2 SERPL-SCNC: 28 MMOL/L (ref 22–32)
CREAT BLD-MCNC: 0.56 MG/DL (ref 0.55–1.02)
CREAT BLD-MCNC: 0.6 MG/DL (ref 0.55–1.02)
ERYTHROCYTE [DISTWIDTH] IN BLOOD BY AUTOMATED COUNT: 12.7 % (ref 11.5–16)
GLUCOSE BLD-MCNC: 102 MG/DL (ref 65–99)
GLUCOSE BLD-MCNC: 103 MG/DL (ref 65–99)
GLUCOSE BLD-MCNC: 119 MG/DL (ref 70–99)
GLUCOSE BLD-MCNC: 94 MG/DL (ref 70–99)
HCT VFR BLD AUTO: 31.8 % (ref 34–50)
HGB BLD-MCNC: 10.4 G/DL (ref 12–16)
MCH RBC QN AUTO: 29.2 PG (ref 27–33.2)
MCHC RBC AUTO-ENTMCNC: 32.7 G/DL (ref 31–37)
MCV RBC AUTO: 89.3 FL (ref 81–100)
OSMOLALITY SERPL CALC.SUM OF ELEC: 288 MOSM/KG (ref 275–295)
OSMOLALITY SERPL CALC.SUM OF ELEC: 289 MOSM/KG (ref 275–295)
P AXIS: 35 DEGREES
P-R INTERVAL: 182 MS
PLATELET # BLD AUTO: 370 10(3)UL (ref 150–450)
POTASSIUM SERPL-SCNC: 3.4 MMOL/L (ref 3.6–5.1)
POTASSIUM SERPL-SCNC: 3.7 MMOL/L (ref 3.6–5.1)
Q-T INTERVAL: 452 MS
QRS DURATION: 112 MS
QTC CALCULATION (BEZET): 488 MS
R AXIS: -42 DEGREES
RBC # BLD AUTO: 3.56 X10(6)UL (ref 3.8–5.1)
RED CELL DISTRIBUTION WIDTH-SD: 41.3 FL (ref 35.1–46.3)
SODIUM SERPL-SCNC: 139 MMOL/L (ref 136–144)
SODIUM SERPL-SCNC: 139 MMOL/L (ref 136–144)
T AXIS: 6 DEGREES
VANCOMYCIN TROUGH SERPL-MCNC: 9.7 UG/ML (ref 10–20)
VENTRICULAR RATE: 70 BPM
WBC # BLD AUTO: 12.3 X10(3) UL (ref 4–13)

## 2019-01-23 PROCEDURE — 03UL07Z SUPPLEMENT LEFT INTERNAL CAROTID ARTERY WITH AUTOLOGOUS TISSUE SUBSTITUTE, OPEN APPROACH: ICD-10-PCS | Performed by: SURGERY

## 2019-01-23 PROCEDURE — 06BP0ZZ EXCISION OF RIGHT SAPHENOUS VEIN, OPEN APPROACH: ICD-10-PCS | Performed by: SURGERY

## 2019-01-23 PROCEDURE — 03CJ0ZZ EXTIRPATION OF MATTER FROM LEFT COMMON CAROTID ARTERY, OPEN APPROACH: ICD-10-PCS | Performed by: SURGERY

## 2019-01-23 PROCEDURE — 99233 SBSQ HOSP IP/OBS HIGH 50: CPT | Performed by: OTHER

## 2019-01-23 PROCEDURE — 03CL0ZZ EXTIRPATION OF MATTER FROM LEFT INTERNAL CAROTID ARTERY, OPEN APPROACH: ICD-10-PCS | Performed by: SURGERY

## 2019-01-23 PROCEDURE — 03UJ07Z SUPPLEMENT LEFT COMMON CAROTID ARTERY WITH AUTOLOGOUS TISSUE SUBSTITUTE, OPEN APPROACH: ICD-10-PCS | Performed by: SURGERY

## 2019-01-23 PROCEDURE — 03CN0ZZ EXTIRPATION OF MATTER FROM LEFT EXTERNAL CAROTID ARTERY, OPEN APPROACH: ICD-10-PCS | Performed by: SURGERY

## 2019-01-23 PROCEDURE — 03UN07Z SUPPLEMENT LEFT EXTERNAL CAROTID ARTERY WITH AUTOLOGOUS TISSUE SUBSTITUTE, OPEN APPROACH: ICD-10-PCS | Performed by: SURGERY

## 2019-01-23 RX ORDER — SODIUM CHLORIDE, SODIUM LACTATE, POTASSIUM CHLORIDE, CALCIUM CHLORIDE 600; 310; 30; 20 MG/100ML; MG/100ML; MG/100ML; MG/100ML
INJECTION, SOLUTION INTRAVENOUS CONTINUOUS
Status: DISCONTINUED | OUTPATIENT
Start: 2019-01-23 | End: 2019-01-23 | Stop reason: HOSPADM

## 2019-01-23 RX ORDER — HYDROMORPHONE HYDROCHLORIDE 1 MG/ML
0.4 INJECTION, SOLUTION INTRAMUSCULAR; INTRAVENOUS; SUBCUTANEOUS EVERY 5 MIN PRN
Status: DISCONTINUED | OUTPATIENT
Start: 2019-01-23 | End: 2019-01-23 | Stop reason: HOSPADM

## 2019-01-23 RX ORDER — SODIUM CHLORIDE, SODIUM LACTATE, POTASSIUM CHLORIDE, CALCIUM CHLORIDE 600; 310; 30; 20 MG/100ML; MG/100ML; MG/100ML; MG/100ML
INJECTION, SOLUTION INTRAVENOUS CONTINUOUS
Status: DISCONTINUED | OUTPATIENT
Start: 2019-01-23 | End: 2019-01-24

## 2019-01-23 RX ORDER — MORPHINE SULFATE 4 MG/ML
2 INJECTION, SOLUTION INTRAMUSCULAR; INTRAVENOUS EVERY 2 HOUR PRN
Status: DISCONTINUED | OUTPATIENT
Start: 2019-01-23 | End: 2019-01-24

## 2019-01-23 RX ORDER — MORPHINE SULFATE 4 MG/ML
1 INJECTION, SOLUTION INTRAMUSCULAR; INTRAVENOUS EVERY 2 HOUR PRN
Status: DISCONTINUED | OUTPATIENT
Start: 2019-01-23 | End: 2019-01-24

## 2019-01-23 RX ORDER — ONDANSETRON 2 MG/ML
4 INJECTION INTRAMUSCULAR; INTRAVENOUS EVERY 6 HOURS PRN
Status: DISCONTINUED | OUTPATIENT
Start: 2019-01-23 | End: 2019-01-24

## 2019-01-23 RX ORDER — BISACODYL 10 MG
10 SUPPOSITORY, RECTAL RECTAL
Status: DISCONTINUED | OUTPATIENT
Start: 2019-01-23 | End: 2019-01-27

## 2019-01-23 RX ORDER — NITROGLYCERIN 20 MG/100ML
INJECTION INTRAVENOUS CONTINUOUS
Status: DISCONTINUED | OUTPATIENT
Start: 2019-01-23 | End: 2019-01-24

## 2019-01-23 RX ORDER — BACITRACIN 50000 [USP'U]/1
INJECTION, POWDER, LYOPHILIZED, FOR SOLUTION INTRAMUSCULAR AS NEEDED
Status: DISCONTINUED | OUTPATIENT
Start: 2019-01-23 | End: 2019-01-23 | Stop reason: HOSPADM

## 2019-01-23 RX ORDER — NALOXONE HYDROCHLORIDE 0.4 MG/ML
80 INJECTION, SOLUTION INTRAMUSCULAR; INTRAVENOUS; SUBCUTANEOUS AS NEEDED
Status: DISCONTINUED | OUTPATIENT
Start: 2019-01-23 | End: 2019-01-23 | Stop reason: HOSPADM

## 2019-01-23 RX ORDER — NITROGLYCERIN 20 MG/100ML
INJECTION INTRAVENOUS CONTINUOUS
Status: DISCONTINUED | OUTPATIENT
Start: 2019-01-23 | End: 2019-01-23 | Stop reason: HOSPADM

## 2019-01-23 RX ORDER — ASPIRIN 81 MG/1
81 TABLET, CHEWABLE ORAL DAILY
Status: DISCONTINUED | OUTPATIENT
Start: 2019-01-23 | End: 2019-01-27

## 2019-01-23 RX ORDER — ONDANSETRON 2 MG/ML
4 INJECTION INTRAMUSCULAR; INTRAVENOUS AS NEEDED
Status: DISCONTINUED | OUTPATIENT
Start: 2019-01-23 | End: 2019-01-23 | Stop reason: HOSPADM

## 2019-01-23 RX ORDER — DOCUSATE SODIUM 100 MG/1
100 CAPSULE, LIQUID FILLED ORAL 2 TIMES DAILY
Status: DISCONTINUED | OUTPATIENT
Start: 2019-01-23 | End: 2019-01-27

## 2019-01-23 RX ORDER — CLOPIDOGREL BISULFATE 75 MG/1
75 TABLET ORAL DAILY
Status: DISCONTINUED | OUTPATIENT
Start: 2019-01-23 | End: 2019-01-27

## 2019-01-23 RX ORDER — HYDROCODONE BITARTRATE AND ACETAMINOPHEN 5; 325 MG/1; MG/1
2 TABLET ORAL EVERY 4 HOURS PRN
Status: DISCONTINUED | OUTPATIENT
Start: 2019-01-23 | End: 2019-01-24

## 2019-01-23 RX ORDER — LISINOPRIL 5 MG/1
5 TABLET ORAL ONCE
Status: COMPLETED | OUTPATIENT
Start: 2019-01-23 | End: 2019-01-23

## 2019-01-23 RX ORDER — MORPHINE SULFATE 4 MG/ML
4 INJECTION, SOLUTION INTRAMUSCULAR; INTRAVENOUS EVERY 2 HOUR PRN
Status: DISCONTINUED | OUTPATIENT
Start: 2019-01-23 | End: 2019-01-24

## 2019-01-23 RX ORDER — CEFAZOLIN SODIUM 1 G/3ML
INJECTION, POWDER, FOR SOLUTION INTRAMUSCULAR; INTRAVENOUS
Status: DISCONTINUED | OUTPATIENT
Start: 2019-01-23 | End: 2019-01-23 | Stop reason: HOSPADM

## 2019-01-23 RX ORDER — HYDROCODONE BITARTRATE AND ACETAMINOPHEN 5; 325 MG/1; MG/1
1 TABLET ORAL EVERY 4 HOURS PRN
Status: DISCONTINUED | OUTPATIENT
Start: 2019-01-23 | End: 2019-01-24

## 2019-01-23 RX ORDER — HYDRALAZINE HYDROCHLORIDE 20 MG/ML
10 INJECTION INTRAMUSCULAR; INTRAVENOUS
Status: DISCONTINUED | OUTPATIENT
Start: 2019-01-23 | End: 2019-01-27

## 2019-01-23 RX ORDER — VANCOMYCIN HYDROCHLORIDE 1 G/20ML
INJECTION, POWDER, LYOPHILIZED, FOR SOLUTION INTRAVENOUS
Status: DISCONTINUED | OUTPATIENT
Start: 2019-01-23 | End: 2019-01-23 | Stop reason: HOSPADM

## 2019-01-23 RX ORDER — ACETAMINOPHEN 325 MG/1
650 TABLET ORAL EVERY 4 HOURS PRN
Status: DISCONTINUED | OUTPATIENT
Start: 2019-01-23 | End: 2019-01-24

## 2019-01-23 NOTE — PROGRESS NOTES
[facility]  Progress Note    Ryannaung Boudreauxniravnicolas Patient Status:  Inpatient    1934 MRN [id number]   [facility] 6NE-A Attending Stuart Forbes MD   Hosp Day # 10 PCP Mary Riggs       Subjective:  Slightly confused but pleasant.  Doc Kruse Q24H   • [MAR Hold] mupirocin  1 Application Nasal U94Q   • [MAR Hold] lisinopril  2.5 mg Oral Daily   • [MAR Hold] Travoprost (ALEXANDR Free)  1 drop Both Eyes Nightly   • [MAR Hold] atorvastatin  40 mg Oral Nightly   • [MAR Hold] Netarsudil Dimesylate  1 drop

## 2019-01-23 NOTE — ANESTHESIA POSTPROCEDURE EVALUATION
[address] [facility] Patient Status:  Inpatient   Age/Gender 80year old female MRN [id number]   Location [address] [facility] Attending Shvia Novak MD   Hosp Day # 10 PCP Sasha Chamberlain       Anesthesia Post-op Note

## 2019-01-23 NOTE — PROGRESS NOTES
ANN HOSPITALIST  Progress Note     Jean Asher Patient Status:  Inpatient    1934 MRN [id number]   [facility] 6NE-A Attending MICHELLE Victor MD   Hosp Day # 8 PCP Sasha Chamberlain     Chief Complaint:  Acute CVA, carotid stenosis Intravenous Once   • vancomycin  1,000 mg Intravenous Q24H   • mupirocin  1 Application Nasal M65O   • Chlorhexidine Gluconate  30 mL Topical Daily   • lisinopril  2.5 mg Oral Daily   • Travoprost (ALEXANDR Free)  1 drop Both Eyes Nightly   • atorvastatin  40 m

## 2019-01-23 NOTE — PROGRESS NOTES
[facility]                INFECTIOUS DISEASE PROGRESS NOTE    Sebastián Boudreauxniravnicolas Patient Status:  Inpatient    1934 MRN [id number]   [facility] 7NE-A Attending Krishna Gipson MD   Hosp Day # 10 PCP Jerome Kebede Date Value   01/23/2019 9.7 (L)     Microbiology    Hospital Encounter on 01/13/19   1. BLOOD CULTURE     Status: None (Preliminary result)    Collection Time: 01/18/19  5:44 PM   Result Value Ref Range    Blood Culture Result No Growth 4 Days N/A   2.  U

## 2019-01-23 NOTE — PROGRESS NOTES
[id number] Yudi Simpson Neurology Progress Note    Ruel Villanueva Patient Status:  Inpatient    1934 MRN [id number]   [facility] 7NE-A Attending Kyle Clark MD   Hosp Day # 10 PCP Adan Min         Subjective:  Ruel Villanueva Intravenous Once   • vancomycin  1,000 mg Intravenous Q24H   • [MAR Hold] mupirocin  1 Application Nasal I21Y   • [MAR Hold] lisinopril  2.5 mg Oral Daily   • [MAR Hold] Travoprost (ALEXANDR Free)  1 drop Both Eyes Nightly   • [MAR Hold] atorvastatin  40 mg Ora [address] [facility] In affiliation with [employer]. Board Certified in Neurology, Vascular Neurology(Stroke), Neurocritical Care and Headache Medicine.

## 2019-01-23 NOTE — OPERATIVE REPORT
Pre-op diagnosis: Left CVA/ Left carotid stenosis. Joseph Chi Post-op diagnosis: Same. Procedure: Left CEA/  Right GSV vein patch/ leigh ann shunt. Ludy: Jorge./ Asst: ADCHPXLR. EBL 200cc. 700cc crystalloid.  Neurologically unchanged after surgery

## 2019-01-23 NOTE — OPERATIVE REPORT
[facility]    PATIENT'S NAME: Jari Cooks   ATTENDING PHYSICIAN: Martin Victor M.D. OPERATING PHYSICIAN: Yenny Romo M.D.    PATIENT ACCOUNT#:   [de-identified]    LOCATION:  PACU [facility] PACU 6 EDWP 10  MEDICAL RECORD #:   [id number]       DATE OF of medical management alone, risks and complications of contralateral carotid artery occlusion were explained. All questions answered. She is aware of the risk of reperfusion.     OPERATIVE TECHNIQUE:  The patient placed supine on procedure table, underwe beyond its origin.     The patient received a bolus of heparin, and after greater than a 5-minute period of time with the blood pressure elevated, a Hatch clamp was placed upon the internal carotid artery distally, vessel loop and angled PV clamp on the co completion of the anastomosis and control of the internal carotid artery at the bifurcation, flow was established from the common up the external carotid artery, and after an appropriate period of time up the internal carotid artery.   Total clamp time afte

## 2019-01-23 NOTE — PLAN OF CARE
Assumed care at [address] [facility]. Pt a/ox1, neuro assessment unchanged. VSS, NSR per tele. RA. Denies pain. IVF infusing per order. Meds given per MAR. NPO since midnight for LCEA in am.  Will continue to monitor.   Impaired Activities of Daily Living    • Achieve

## 2019-01-24 LAB
ANION GAP SERPL CALC-SCNC: 7 MMOL/L (ref 0–18)
APTT PPP: 33.4 SECONDS (ref 26.1–34.6)
BUN BLD-MCNC: 9 MG/DL (ref 8–20)
BUN/CREAT SERPL: 16.1 (ref 10–20)
CALCIUM BLD-MCNC: 7.2 MG/DL (ref 8.3–10.3)
CHLORIDE SERPL-SCNC: 105 MMOL/L (ref 101–111)
CO2 SERPL-SCNC: 26 MMOL/L (ref 22–32)
CREAT BLD-MCNC: 0.56 MG/DL (ref 0.55–1.02)
ERYTHROCYTE [DISTWIDTH] IN BLOOD BY AUTOMATED COUNT: 12.8 % (ref 11.5–16)
GLUCOSE BLD-MCNC: 114 MG/DL (ref 70–99)
GLUCOSE BLD-MCNC: 121 MG/DL (ref 65–99)
GLUCOSE BLD-MCNC: 203 MG/DL (ref 65–99)
HAV IGM SER QL: 1.7 MG/DL (ref 1.8–2.5)
HCT VFR BLD AUTO: 33.8 % (ref 34–50)
HGB BLD-MCNC: 11.4 G/DL (ref 12–16)
INR BLD: 1.21 (ref 0.9–1.1)
MCH RBC QN AUTO: 29.7 PG (ref 27–33.2)
MCHC RBC AUTO-ENTMCNC: 33.7 G/DL (ref 31–37)
MCV RBC AUTO: 88 FL (ref 81–100)
OSMOLALITY SERPL CALC.SUM OF ELEC: 286 MOSM/KG (ref 275–295)
PHOSPHATE SERPL-MCNC: 1.8 MG/DL (ref 2.5–4.9)
PLATELET # BLD AUTO: 381 10(3)UL (ref 150–450)
POTASSIUM SERPL-SCNC: 3.9 MMOL/L (ref 3.6–5.1)
PSA SERPL DL<=0.01 NG/ML-MCNC: 15.8 SECONDS (ref 12.4–14.7)
RBC # BLD AUTO: 3.84 X10(6)UL (ref 3.8–5.1)
RED CELL DISTRIBUTION WIDTH-SD: 41.2 FL (ref 35.1–46.3)
SODIUM SERPL-SCNC: 138 MMOL/L (ref 136–144)
VANCOMYCIN SERPL-MCNC: 8.8 UG/ML
WBC # BLD AUTO: 11.7 X10(3) UL (ref 4–13)

## 2019-01-24 PROCEDURE — 99233 SBSQ HOSP IP/OBS HIGH 50: CPT | Performed by: OTHER

## 2019-01-24 PROCEDURE — 99232 SBSQ HOSP IP/OBS MODERATE 35: CPT | Performed by: HOSPITALIST

## 2019-01-24 RX ORDER — SENNOSIDES 8.6 MG
8.6 TABLET ORAL 2 TIMES DAILY
Status: DISCONTINUED | OUTPATIENT
Start: 2019-01-24 | End: 2019-01-27

## 2019-01-24 RX ORDER — POLYETHYLENE GLYCOL 3350 17 G/17G
17 POWDER, FOR SOLUTION ORAL DAILY
Status: DISCONTINUED | OUTPATIENT
Start: 2019-01-24 | End: 2019-01-27

## 2019-01-24 RX ORDER — MAGNESIUM OXIDE 400 MG (241.3 MG MAGNESIUM) TABLET
400 TABLET ONCE
Status: COMPLETED | OUTPATIENT
Start: 2019-01-24 | End: 2019-01-24

## 2019-01-24 NOTE — PROGRESS NOTES
[facility]  Progress Note    Anna Villanueva Patient Status:  Inpatient    1934 MRN [id number]   [facility] 6NE-A Attending Kristina Cooper MD   Hosp Day # 11 PCP Anna Saenz       Subjective:  Doing well. Much more oriented.  O Medications:    • potassium & sodium phosphates  1 packet Oral TID CC   • PEG 3350  17 g Oral Daily   • Senna  8.6 mg Oral BID   • Clopidogrel Bisulfate  75 mg Oral Daily   • aspirin  81 mg Oral Daily   • docusate sodium  100 mg Oral BID   • mupi

## 2019-01-24 NOTE — PHYSICAL THERAPY NOTE
PHYSICAL THERAPY EVALUATION - INPATIENT     Room Number: 4627/6616-Q  Evaluation Date: 1/24/2019  Type of Evaluation: Re-evaluation  Physician Order: PT Eval and Treat    Presenting Problem: CVA, s/p L CEA 1/23/19  Reason for Therapy: Mobility Dysfun • HIP REPLACEMENT SURGERY     • TOTAL HIP REPLACEMENT         HOME SITUATION  Type of Home: Independent living facility   Home Layout: Multi-level                Lives With: Alone  Drives: No     Patient Regularly Uses: Glasses    Prior Level of Indepe +  Dynamic Sitting: Not tested  Static Standing: Dependent  Dynamic Standing: Not tested    ADDITIONAL TESTS                                    NEUROLOGICAL FINDINGS                      ACTIVITY TOLERANCE           BP: 122/75             O2 WALK Pt given VC for R knee extension and upright posture. Pt fatiguing easily. Pt returned to bedside chair. Pt educated on LE HEP. Pt left with call light in reach and alarm donned.        Exercise/Education Provided:  Energy conservation  Functional activity training;Balance training  Rehab Potential : Fair  Frequency (Obs): 5x/week  Number of Visits to Meet Established Goals: 6      CURRENT GOALS    Goal #1 Patient is able to demonstrate supine - sit EOB @ level: maximum assistance     Goal #2 Patient is able

## 2019-01-24 NOTE — PLAN OF CARE
Transfer to [facility] [address] Difficulty assessing A&O d/t global aphasia. Slight right sided weakness. Glasses @ bedside. On RA V-paced per tele. C/o back pain. Implanted pain pump and repositioning. Declined further medications.    lft carotid incision c/d/I w/

## 2019-01-24 NOTE — OCCUPATIONAL THERAPY NOTE
OCCUPATIONAL THERAPY EVALUATION - INPATIENT     Room Number: 8924/8556-C  Evaluation Date: 1/24/2019  Type of Evaluation: Initial and Re-evaluation  Presenting Problem: L MCA infarct, B ICA stenosis, s/p L CEA    Physician Order: IP Consult to Occupational Cancer [facility])    • Cataract    • Depression    • Essential hypertension    • Glaucoma    • Hearing impairment    • High blood pressure    • Incontinence    • Muscle weakness    • Pneumonia due to organism    • Thyroid disease    • Visual impairment        P impaired  Safety Judgement:  decreased awareness of need for assistance and decreased awareness of need for safety  Awareness of Errors:  assistance required to identify errors made, assistance required to correct errors made and decreased awareness of err (G-Code): CL    FUNCTIONAL TRANSFER ASSESSMENT  Supine to Sit : Not tested  Sit to Stand: Dependent assistance    Skilled Therapy Provided: Pt seen seated, up to chair with nursing staff. Completed UE testing, oculomotor screening.  Sit to stand with max (A and would benefit from skilled inpatient OT to address the above deficits, maximizing patient’s ability to return safely to her prior level of function.     OT Discharge Recommendations: Sub-acute rehabilitation(ELOS 13-15 days)  OT Device Recommendations:

## 2019-01-24 NOTE — PROGRESS NOTES
ANN HOSPITALIST  Progress Note     Estel Citizen Patient Status:  Inpatient    1934 MRN [id number]   [facility] 6NE-A Attending MICHELLE Victor MD   Hosp Day # 6 PCP Nilo Poll     Chief Complaint:  Acute CVA, carotid stenosis 7.3   --    --    --    --     < > = values in this interval not displayed. Estimated Creatinine Clearance: 61.9 mL/min (based on SCr of 0.56 mg/dL).     Recent Labs   Lab  01/24/19   0432   PTP  15.8*   INR  1.21*       No results for input(s): TROP, Discussed with patient and RN.  Daughter   Amparo Nagy, NP     Patient seen and examined  Doing better at time of visit  Up in chair, aphasic    /77 (BP Location: Left arm)   Pulse 84   Temp 98.3 °F (36.8 °C) (Oral)   Resp 14   Ht 5' 3\" (1.6 m

## 2019-01-24 NOTE — SLP NOTE
SPEECH/LANGUAGE/COGNITIVE EVALUATION & DYSPHAGIA FOLLOW UP - INPATIENT    Admission Date: 1/13/2019  Evaluation Date: 01/24/19    Reason for Referral: Stroke protocol;R/O aspiration    ASSESSMENT & PLAN   ASSESSMENT & IMPRESSION  Pt seen for dysphagia tx t comprehension;Verbal expression(reading and writing TBA)    Discharge Recommendations/Plan: Undetermined    Patient Experiencing Pain: No                           GOALS  Goal #1 The patient will tolerate regular consistency and thin liquids without overt 14:37       Approved by: Anthony Peacock MD       Patient/Family Goals: to get better    Interdisciplinary Communication: Discussed with RN    Patient, family and/or caregiver has been informed and has taken part in this evaluation and plan of treatment an

## 2019-01-24 NOTE — PLAN OF CARE
Received pt from PACU. Pt drowsy but able to follow commands intermittently. Patient confused with expressive aphasia. Patient requesting to be left alone. Patient off Nitro and Nipride gtt.

## 2019-01-24 NOTE — OCCUPATIONAL THERAPY NOTE
Pt being followed by OT. Pt transferred to CNICU s/p L CEA. Due to change in status, will place patient ON HOLD. Please re-order therapy, as appropriate.

## 2019-01-24 NOTE — PROGRESS NOTES
[id number] Yudi Simpson Neurology Progress Note    Reese Villanueva Patient Status:  Inpatient    1934 MRN [id number]   [facility] 7NE-A Attending Gela Gallegos MD   [address] Day # 11 PCP Kenny Gandhi         Subjective:  Reese Villanueva Imaging/Diagnostic:    • potassium & sodium phosphates  1 packet Oral TID CC   • magnesium oxide  400 mg Oral Once   • Clopidogrel Bisulfate  75 mg Oral Daily   • aspirin  81 mg Oral Daily   • docusate sodium  100 mg Oral BID   • vancomycin  1,000 Surgery. Ok to transfer to floor neuro wise. Thank you for allowing me to take care of this patient,    Kvng Dozier MD  Medical Director - Stroke and [address] [facility] - In affiliation with [facility].

## 2019-01-24 NOTE — PROGRESS NOTES
[facility]                INFECTIOUS DISEASE PROGRESS NOTE    Syeda Villanueva Patient Status:  Inpatient    1934 MRN [id number]   [facility] 7NE-A Attending Nahum Covington MD   [facility] Day # 11 PCP Christine Fernandez     Antibiotic --    AST  22  31   --    --    --    --    ALT  10*  25   --    --    --    --    BILT  0.6  0.4   --    --    --    --    TP  7.3  7.3   --    --    --    --     < > = values in this interval not displayed.        Vancomycin Trough (ug/mL)   Date [facility]  [phone]

## 2019-01-24 NOTE — PHYSICAL THERAPY NOTE
Pt being followed by physical therapy. Pt transferred to CNICU s/p L CEA. Due to change in status, will place patient ON HOLD. Please re-order therapy, as appropriate.

## 2019-01-25 LAB
GLUCOSE BLD-MCNC: 113 MG/DL (ref 65–99)
HAV IGM SER QL: 1.9 MG/DL (ref 1.8–2.5)
PHOSPHATE SERPL-MCNC: 1.3 MG/DL (ref 2.5–4.9)
PHOSPHATE SERPL-MCNC: 3.5 MG/DL (ref 2.5–4.9)

## 2019-01-25 PROCEDURE — 99232 SBSQ HOSP IP/OBS MODERATE 35: CPT | Performed by: NURSE PRACTITIONER

## 2019-01-25 PROCEDURE — 99231 SBSQ HOSP IP/OBS SF/LOW 25: CPT | Performed by: HOSPITALIST

## 2019-01-25 RX ORDER — ACETAMINOPHEN 325 MG/1
650 TABLET ORAL EVERY 4 HOURS PRN
Refills: 0 | Status: SHIPPED | COMMUNITY
Start: 2019-01-25

## 2019-01-25 RX ORDER — ATORVASTATIN CALCIUM 40 MG/1
40 TABLET, FILM COATED ORAL NIGHTLY
Qty: 30 TABLET | Refills: 0 | Status: SHIPPED | OUTPATIENT
Start: 2019-01-25 | End: 2019-03-05

## 2019-01-25 RX ORDER — CLOPIDOGREL BISULFATE 75 MG/1
75 TABLET ORAL DAILY
Qty: 30 TABLET | Refills: 1 | Status: SHIPPED | OUTPATIENT
Start: 2019-01-26 | End: 2019-01-01

## 2019-01-25 RX ORDER — LISINOPRIL 2.5 MG/1
2.5 TABLET ORAL ONCE
Status: COMPLETED | OUTPATIENT
Start: 2019-01-25 | End: 2019-01-25

## 2019-01-25 RX ORDER — LISINOPRIL 5 MG/1
5 TABLET ORAL DAILY
Status: DISCONTINUED | OUTPATIENT
Start: 2019-01-26 | End: 2019-01-26

## 2019-01-25 NOTE — PROGRESS NOTES
[id number] Yudi Simpson Neurology Progress Note    Jeff Villanueva Patient Status:  Inpatient    1934 MRN [id number]   [facility] 7NE-A Attending Luciana Epley, MD   Hosp Day # 12 PCP Lucien Prieto     CC: Left sided weakness/Aphasia territory of the left MCA.  Extends toward the posterior aspect of the left insula.      1/13/2019 CTA Brain and Neck  Approximately 80% stenosis secondary to irregular eccentric plaque of the right proximal ICA.   Approximately 50-60% stenosis involving th

## 2019-01-25 NOTE — CM/SW NOTE
fidel notified pt should be ready for DC Saturday. Call placed to [place] [phone] and  left to confirm bed availability and admission for potentially Saturday.  Awaiting call back      ADDENDUM:  fidel confirmed with [facility] that they will be

## 2019-01-25 NOTE — PLAN OF CARE
[employer] dual chamber pacemaker implanted 1/7/2018 interrogated today since patient has not had follow up visit in outpatient clinic.     No events noted  Pacemaker site healing well    JUDY Jones

## 2019-01-25 NOTE — PROGRESS NOTES
No complaints- would like to leave- neurologically at baseline. Still with aphasia. Moving arms/legs. Cranial Nerves intact. Wounds clean/dry.   Would monitor in hospital another day at least- keep Blood Pressure under contro;

## 2019-01-25 NOTE — PROGRESS NOTES
Vi[employer]UAL INTERACTiVE [facility] PPM interrogated. 1 [phone] # called for rep. Will return call to Simi/Sharmila.

## 2019-01-25 NOTE — PROGRESS NOTES
[facility]  Cardiology Progress Note    Mayur Villanueva Patient Status:  Inpatient    1934 MRN [id number]   [facility] 7NE-A Attending Rito Newman MD   Hosp Day # 12 PCP Serge Ya     Subjective:  Doing well - up in chair Both Eyes Q8H [address]         Assessment:  1. Francisco J carotid disease s/p left cea w/vein patch- POD 2- doing well. Asymptomatic 90% PRANAV stenosis. 2. Acute L MCA stroke 1/15  3. CHB s/p [employer] dual chamber ppm 1/7- site looks good. Steri-strips removed  4.  HTN

## 2019-01-25 NOTE — PLAN OF CARE
Assumed care at 1900. Alert and oriented x1-2. With confusion. Telemetry monitor reading V-paced. Very mild back pain with Tylenol given. Pacemaker insertion site closed with glue, BEBETO. Lt neck with steri stripes, dry and intact.  Assessment remains unchang

## 2019-01-25 NOTE — PLAN OF CARE
Difficulty assessing A&O d/t global aphasia. Slight right sided weakness. Glasses @ bedside. On RA V-paced per tele. C/o back pain. Implanted pain pump and repositioning. Declined further medications. lft carotid incision c/d/I w/ steri strips.    Goo

## 2019-01-25 NOTE — PROGRESS NOTES
ANN HOSPITALIST  Progress Note     Guilledianna Hill Patient Status:  Inpatient    1934 MRN [id number]   [facility] 6NE-A Attending MICHELLE Victor MD   Hosp Day # 15 PCP Pushpa Davenport     Chief Complaint:  Acute CVA, carotid stenosis Clearance: 61.9 mL/min (based on SCr of 0.56 mg/dL). Recent Labs   Lab  01/24/19   0432   PTP  15.8*   INR  1.21*       No results for input(s): TROP, CK in the last 168 hours. Imaging: Imaging data reviewed in Epic.     Medications:   • sodium peg · CODE status: DNR    Estimated date of discharge:   Sat   Discharge is dependent on:  Clinical course  At this point Ms. Villanueva is expected to be discharge to:   [facility]     Discussed with patient and RN.  Daughter   Helen Murphy NP     P

## 2019-01-26 LAB
ANION GAP SERPL CALC-SCNC: 6 MMOL/L (ref 0–18)
BASOPHILS # BLD AUTO: 0.07 X10(3) UL (ref 0–0.1)
BASOPHILS NFR BLD AUTO: 0.6 %
BUN BLD-MCNC: 12 MG/DL (ref 8–20)
BUN/CREAT SERPL: 21.8 (ref 10–20)
CALCIUM BLD-MCNC: 7.4 MG/DL (ref 8.3–10.3)
CHLORIDE SERPL-SCNC: 106 MMOL/L (ref 101–111)
CO2 SERPL-SCNC: 28 MMOL/L (ref 22–32)
CREAT BLD-MCNC: 0.55 MG/DL (ref 0.55–1.02)
EOSINOPHIL # BLD AUTO: 0.3 X10(3) UL (ref 0–0.3)
EOSINOPHIL NFR BLD AUTO: 2.6 %
ERYTHROCYTE [DISTWIDTH] IN BLOOD BY AUTOMATED COUNT: 13.2 % (ref 11.5–16)
GLUCOSE BLD-MCNC: 110 MG/DL (ref 70–99)
HCT VFR BLD AUTO: 31.7 % (ref 34–50)
HGB BLD-MCNC: 10.5 G/DL (ref 12–16)
IMMATURE GRANULOCYTE COUNT: 0.04 X10(3) UL (ref 0–1)
IMMATURE GRANULOCYTE RATIO %: 0.3 %
LYMPHOCYTES # BLD AUTO: 1.67 X10(3) UL (ref 0.9–4)
LYMPHOCYTES NFR BLD AUTO: 14.6 %
MCH RBC QN AUTO: 29 PG (ref 27–33.2)
MCHC RBC AUTO-ENTMCNC: 33.1 G/DL (ref 31–37)
MCV RBC AUTO: 87.6 FL (ref 81–100)
MONOCYTES # BLD AUTO: 1.51 X10(3) UL (ref 0.1–1)
MONOCYTES NFR BLD AUTO: 13.2 %
NEUTROPHIL ABS PRELIM: 7.87 X10 (3) UL (ref 1.3–6.7)
NEUTROPHILS # BLD AUTO: 7.87 X10(3) UL (ref 1.3–6.7)
NEUTROPHILS NFR BLD AUTO: 68.7 %
OSMOLALITY SERPL CALC.SUM OF ELEC: 290 MOSM/KG (ref 275–295)
PHOSPHATE SERPL-MCNC: 1.7 MG/DL (ref 2.5–4.9)
PHOSPHATE SERPL-MCNC: 2 MG/DL (ref 2.5–4.9)
PLATELET # BLD AUTO: 375 10(3)UL (ref 150–450)
POTASSIUM SERPL-SCNC: 3.7 MMOL/L (ref 3.6–5.1)
RBC # BLD AUTO: 3.62 X10(6)UL (ref 3.8–5.1)
RED CELL DISTRIBUTION WIDTH-SD: 41.9 FL (ref 35.1–46.3)
SODIUM SERPL-SCNC: 140 MMOL/L (ref 136–144)
WBC # BLD AUTO: 11.5 X10(3) UL (ref 4–13)

## 2019-01-26 PROCEDURE — 99231 SBSQ HOSP IP/OBS SF/LOW 25: CPT | Performed by: HOSPITALIST

## 2019-01-26 RX ORDER — LISINOPRIL 5 MG/1
5 TABLET ORAL DAILY
Qty: 30 TABLET | Refills: 3 | Status: SHIPPED | OUTPATIENT
Start: 2019-01-26 | End: 2019-01-27

## 2019-01-26 RX ORDER — LISINOPRIL 10 MG/1
10 TABLET ORAL DAILY
Status: DISCONTINUED | OUTPATIENT
Start: 2019-01-27 | End: 2019-01-27

## 2019-01-26 RX ORDER — LISINOPRIL 5 MG/1
5 TABLET ORAL ONCE
Status: COMPLETED | OUTPATIENT
Start: 2019-01-26 | End: 2019-01-26

## 2019-01-26 RX ORDER — BISACODYL 10 MG
10 SUPPOSITORY, RECTAL RECTAL ONCE
Status: COMPLETED | OUTPATIENT
Start: 2019-01-26 | End: 2019-01-26

## 2019-01-26 RX ORDER — POTASSIUM CHLORIDE 20 MEQ/1
40 TABLET, EXTENDED RELEASE ORAL ONCE
Status: COMPLETED | OUTPATIENT
Start: 2019-01-26 | End: 2019-01-26

## 2019-01-26 RX ORDER — LACTULOSE 10 G/15ML
20 SOLUTION ORAL ONCE
Status: COMPLETED | OUTPATIENT
Start: 2019-01-26 | End: 2019-01-26

## 2019-01-26 NOTE — PROGRESS NOTES
· [facility]      Subjective:  Confused calling out to nurses    Objective:  170/73  185/80   99.3  SR rare pacing    Cardiac: S1 S2 regular  Lungs:  Clear anterior  Abdomen: soft  Extremities: no edema left sided weakness persists  Skin: left

## 2019-01-26 NOTE — PLAN OF CARE
Assumed care at [address]. AOx1. No s/s of pain or discomfort. No neuro changes. Left neck dressing C/D/I steri-strips BEBETO. Slept all night. Possible d/c today. Bed alarm on for safety. Call light within reach. Continue to monitor.     Impaired Activities

## 2019-01-26 NOTE — PLAN OF CARE
Patient awake, oriented to person and place; confused, forgetful  Expressive aphasia   Following simple commands, moving all extremities   No acute focal neurological deficits  Telemetry, Sinus Rhythm, Ventricular paced  Denies pain/discomfort  Occasional Progressing    • Remains free of injury related to seizure activity Progressing    • Achieves maximal functionality and self care Progressing        Patient/Family Goals    • Patient/Family Long Term Goal Progressing    • Patient/Family Short Term Goal Pro

## 2019-01-26 NOTE — CM/SW NOTE
01/26/19 1256   Discharge disposition   Expected discharge disposition Skilled Nurs   Name of Facillity/Home Care/Hospice [address]   Discharge transportation [employer]     Confirmed with Beena Tuttle at [facility] patient can admit today, rn to call rep

## 2019-01-26 NOTE — PROGRESS NOTES
ANN HOSPITALIST  Progress Note     Nikolai Osborn Patient Status:  Inpatient    1934 MRN [id number]   [facility] 6NE-A Attending MICHELLE Victor MD   Hosp Day # 15 PCP Cristina Hennessy     Chief Complaint:  Acute CVA, Carotid stenosis, MR Clopidogrel Bisulfate  75 mg Oral Daily   • aspirin  81 mg Oral Daily   • docusate sodium  100 mg Oral BID   • Travoprost (ALEXANDR Free)  1 drop Both Eyes Nightly   • atorvastatin  40 mg Oral Nightly   • brimonidine Tartrate  1 drop Both Eyes Q8H [address]       ASS

## 2019-01-26 NOTE — PROGRESS NOTES
[facility]   [facility] Progress Note    Arnie Alvarez Patient Status:  Inpatient    1934 MRN [id number]   [facility] 7NE-A Attending Prudencio Buerger, MD   Hosp Day # 15 PCP Kar Sanders     Subjective:  She's \"cold\"     Objective Rectal Once PRN   ondansetron HCl (ZOFRAN) injection 4 mg 4 mg Intravenous Q6H PRN   Or      Metoclopramide HCl (REGLAN) injection 10 mg 10 mg Intravenous Q8H PRN   [MAR Hold] Labetalol HCl (TRANDATE) injection 10 mg 10 mg Intravenous Q10 Min PRN       Lab

## 2019-01-27 VITALS
RESPIRATION RATE: 19 BRPM | HEIGHT: 63 IN | WEIGHT: 125.25 LBS | DIASTOLIC BLOOD PRESSURE: 54 MMHG | BODY MASS INDEX: 22.19 KG/M2 | SYSTOLIC BLOOD PRESSURE: 149 MMHG | TEMPERATURE: 98 F | OXYGEN SATURATION: 97 % | HEART RATE: 80 BPM

## 2019-01-27 LAB — POTASSIUM SERPL-SCNC: 4 MMOL/L (ref 3.6–5.1)

## 2019-01-27 RX ORDER — LISINOPRIL 10 MG/1
10 TABLET ORAL DAILY
Qty: 30 TABLET | Refills: 11 | Status: SHIPPED | OUTPATIENT
Start: 2019-01-28 | End: 2019-01-01

## 2019-01-27 NOTE — PLAN OF CARE
Assumed care at [address]. Pt A&Ox2, global aphasia. Neuro checks unchanged. VSS on RA, BP improved. NSR per tele. Incontinent, needs attended to. Safety precautions in place. Will continue to monitor.      Impaired Activities of Daily Living    • Ac

## 2019-01-27 NOTE — PROGRESS NOTES
No complaints- appears neurologically unchanged. Wounds clean/dry. Family aware of follow up.  Still with aphasia and right leg weakness as pre-op

## 2019-01-27 NOTE — PROGRESS NOTES
· [facility]      Subjective:   No new issues overnight - BP is better controlled    Objective:  134/57  afebrile  SR rare a sense v pacing    Cardiac: S1 S2 regular systolic murmur  Lungs:  Clear anterior  Abdomen: soft  Extremities: no edema

## 2019-01-27 NOTE — PLAN OF CARE
Assumed care of pt @ 9321. Pt is A/Ox2, to place and person, global aphasia, equal strength NIH 9, drift noted able to follow some commands. VSS, SR/Vpaced on tele. BP kept between parameters. Incontinent x2, poor appetite.  Up with assist x2, takes pills w

## 2019-01-27 NOTE — CM/SW NOTE
MSW sent updates to [facility] via [facility] and informed them of dc today. [facility] arranged for 3pm . PCS form tubed to RN station.     RN report:  Madan Stanton  [phone]    MSW spoke with avila

## 2019-01-28 NOTE — DISCHARGE SUMMARY
ANN HOSPITALIST  DISCHARGE SUMMARY     Dolly Toni Villanueva Patient Status:  Inpatient    1934 MRN [id number]   [facility] 7NE-A Attending No att. providers found   [facility] Day # 15 PCP Tonny Morrissey     Date of Admission: 2019  Date implant. Patient had CT scan evidence of acute stroke. Patient was admitted followed by neurology     Patient is needed control blood pressure CTA and MRI scans as below. Found to have bilateral carotid stenosis. Per imaging patient needed left CEA.   D 77  59-90 High Risk  29-58 Medium Risk  0-28   Low Risk. TCM Follow-Up Recommendation:  LACE > 58:  High Risk of readmission after discharge from the hospital.    Procedures during hospitalization:   · 1/23 left carotid endarterectomy with right greater social work    Discharge Medication List:     Discharge Medications      START taking these medications      Instructions Prescription details   acetaminophen 325 MG Tabs  Commonly known as:  TYLENOL      Take 2 tablets (650 mg total) by mouth every 4 (fou 11.995 mg/day by Intrathecal route. Refills:  0     PROBIOTIC COLON SUPPORT OR      Take 1 capsule by mouth daily. Refills:  0     RHOPRESSA 0.02 % Soln  Generic drug:  Netarsudil Dimesylate      Place 1 drop into both eyes nightly.    Refills:  0     T Next 30 Days 1/27/2019 - 2/26/2019      Date Arrival Time Visit Type Length Department Provider     2/22/2019 11:00 AM  STROKE FOLLOW UP [3395] 20 min.  Pascual Fofana MD                    Vital signs:

## 2019-01-29 ENCOUNTER — INITIAL APN SNF VISIT (OUTPATIENT)
Dept: INTERNAL MEDICINE CLINIC | Age: 84
End: 2019-01-29

## 2019-01-29 DIAGNOSIS — Z74.09 IMPAIRED FUNCTIONAL MOBILITY, BALANCE, GAIT, AND ENDURANCE: ICD-10-CM

## 2019-01-29 DIAGNOSIS — K59.00 CONSTIPATION, UNSPECIFIED CONSTIPATION TYPE: ICD-10-CM

## 2019-01-29 DIAGNOSIS — R47.01 EXPRESSIVE APHASIA: ICD-10-CM

## 2019-01-29 DIAGNOSIS — E78.5 DYSLIPIDEMIA: ICD-10-CM

## 2019-01-29 DIAGNOSIS — H40.2210 CHRONIC PRIMARY ANGLE-CLOSURE GLAUCOMA OF RIGHT EYE, UNSPECIFIED GLAUCOMA STAGE: ICD-10-CM

## 2019-01-29 DIAGNOSIS — R53.1 GENERALIZED WEAKNESS: ICD-10-CM

## 2019-01-29 DIAGNOSIS — J43.8 OTHER EMPHYSEMA (HCC): ICD-10-CM

## 2019-01-29 DIAGNOSIS — G89.29 OTHER CHRONIC PAIN: ICD-10-CM

## 2019-01-29 DIAGNOSIS — Z98.890 HISTORY OF LEFT-SIDED CAROTID ENDARTERECTOMY: ICD-10-CM

## 2019-01-29 DIAGNOSIS — I69.320 APHASIA S/P CVA: Primary | ICD-10-CM

## 2019-01-29 LAB
GLUCOSE BLD-MCNC: 96 MG/DL (ref 70–99)
ISTAT ACTIVATED CLOTTING TIME: 136 SECONDS (ref 74–137)
ISTAT BLOOD GAS BASE EXCESS: 5 MMOL/L (ref ?–30)
ISTAT BLOOD GAS HCO3: 28.2 MEQ/L (ref 22–26)
ISTAT BLOOD GAS O2 SATURATION: 100 % (ref 92–100)
ISTAT BLOOD GAS PCO2: 38.6 MMHG (ref 35–45)
ISTAT BLOOD GAS PH: 7.47 (ref 7.35–7.45)
ISTAT BLOOD GAS PO2: >430 MMHG (ref 80–105)
ISTAT BLOOD GAS TCO2: 29 MMOL/L (ref 22–32)
ISTAT HEMATOCRIT: 30 % (ref 34–50)
ISTAT IONIZED CALCIUM: 1 MMOL/L (ref 1.12–1.32)
ISTAT POTASSIUM: 3.4 MMOL/L (ref 3.6–5.1)
ISTAT SODIUM: 138 MMOL/L (ref 136–144)

## 2019-01-29 PROCEDURE — 99309 SBSQ NF CARE MODERATE MDM 30: CPT | Performed by: NURSE PRACTITIONER

## 2019-01-30 VITALS
SYSTOLIC BLOOD PRESSURE: 148 MMHG | HEART RATE: 86 BPM | TEMPERATURE: 98 F | RESPIRATION RATE: 17 BRPM | DIASTOLIC BLOOD PRESSURE: 78 MMHG

## 2019-01-31 ENCOUNTER — SNF ADMIT/H&P (OUTPATIENT)
Dept: FAMILY MEDICINE CLINIC | Facility: CLINIC | Age: 84
End: 2019-01-31

## 2019-01-31 DIAGNOSIS — I63.9 ACUTE CVA (CEREBROVASCULAR ACCIDENT) (HCC): Primary | ICD-10-CM

## 2019-01-31 DIAGNOSIS — J44.9 COPD WITH ASTHMA (HCC): ICD-10-CM

## 2019-01-31 DIAGNOSIS — D64.9 ANEMIA, UNSPECIFIED TYPE: ICD-10-CM

## 2019-01-31 DIAGNOSIS — I10 ESSENTIAL HYPERTENSION: ICD-10-CM

## 2019-01-31 DIAGNOSIS — N30.00 ACUTE CYSTITIS WITHOUT HEMATURIA: ICD-10-CM

## 2019-01-31 DIAGNOSIS — K59.09 OTHER CONSTIPATION: ICD-10-CM

## 2019-01-31 DIAGNOSIS — I65.21 STENOSIS OF RIGHT CAROTID ARTERY: Chronic | ICD-10-CM

## 2019-01-31 DIAGNOSIS — R47.01 APHASIA DUE TO ACUTE CEREBROVASCULAR ACCIDENT (CVA) (HCC): ICD-10-CM

## 2019-01-31 DIAGNOSIS — R79.89 AZOTEMIA: ICD-10-CM

## 2019-01-31 DIAGNOSIS — Z98.890 S/P CAROTID ENDARTERECTOMY: ICD-10-CM

## 2019-01-31 DIAGNOSIS — R47.01 EXPRESSIVE APHASIA: ICD-10-CM

## 2019-01-31 DIAGNOSIS — A49.02 MRSA INFECTION: ICD-10-CM

## 2019-01-31 DIAGNOSIS — I63.9 APHASIA DUE TO ACUTE CEREBROVASCULAR ACCIDENT (CVA) (HCC): ICD-10-CM

## 2019-01-31 DIAGNOSIS — E87.1 HYPONATREMIA: ICD-10-CM

## 2019-01-31 DIAGNOSIS — I44.2 COMPLETE HEART BLOCK (HCC): ICD-10-CM

## 2019-01-31 PROCEDURE — 99306 1ST NF CARE HIGH MDM 50: CPT | Performed by: FAMILY MEDICINE

## 2019-01-31 NOTE — PROGRESS NOTES
Kwame Garcia [facility]  : 1934  Age 80year old  female patient is admitted to Facility: [facility].     [address] [place] [address] Drive date:  19  Discharge date to [facility]  19  ELOS:  [address] cardiology. Pacemaker was interrogated prior to discharge, functioning properly, according to the medical record. Patient was started on lisinopril adjusted for better blood pressure control.   Patient is also on Plavix per Dr. Andrei Sprague recommendation to cont % Ophthalmic Solution Place 1 drop into both eyes 2 (two) times daily. Disp:  Rfl:    Netarsudil Dimesylate (RHOPRESSA) 0.02 % Ophthalmic Solution Place 1 drop into both eyes nightly.  Disp:  Rfl:    Travoprost, ALEXANDR Free, (TRAVATAN Z) 0.004 % Ophthalmic Sol aphasia  [place]:--- Unable to assess, expressive aphasia  NEURO:--- Unable to assess, expressive aphasia  PSYCHE: --- Unable to assess, expressive aphasia  HEMATOLOGY:--- Unable to assess, expressive aphasia  ENDOCRINE: --- Unable to a 4.7  Chloride=106  CO2=23  ALT=18  AST=23  Alk Phosphatase=75  Calcium=7.8  eGFR of non-=>60    Blood Sugars:  -19:  Fastin-106  PP:     All lab results from [address] records, notes, lab and imaging results and family, reviewing medical records, labs, completing medication reconciliation and entering orders to establish plan of care in [facility].     Lela Machado, APRN  01/29/19   6:25 PM

## 2019-02-01 NOTE — PROGRESS NOTES
Manjit [facility] Author: Heather Ugalde MD     1934 MRN [id number]   [facility]  Admission 19      Last Hospital Discharge 19 [facility] of Discharge 19       Date of Admission:  Tab Take 1 tablet (75 mg total) by mouth daily. brimonidine Tartrate 0.2 % Ophthalmic Solution Place 1 drop into both eyes 2 (two) times daily. Netarsudil Dimesylate (RHOPRESSA) 0.02 % Ophthalmic Solution Place 1 drop into both eyes nightly.    [employer] reports that she does not drink alcohol. Her drug history is not on file. ROS:   A comprehensive 14 point review of systems was completed.     Pertinent positives and negatives noted in the HPI.       PHYSICAL EXAM:   Estimated body mass index is 22.19 in [facility] [address] and Imaging: Xr Chest Pa + Lat Chest (cpt=71046)    Result Date: 1/8/2019  PROCEDURE:  XR CHEST PA + LAT CHEST (CPT=71046)  INDICATIONS:  Pacemaker placement.   COMPARISON:  EDREGINE , XR CHEST AP PORTABLE  (CPT=71045), 1/ territorial infarct. There is no hemorrhage or mass lesion. There is chronic microvascular ischemic white matter disease in the cerebrum bilaterally. SINUSES:  No acute sinutitis. MASTOIDS:  The mastoids are clear.   SKULL:  No evidence for fracture or BRAIN OR HEAD (27083)  COMPARISON:  None. INDICATIONS:  fall, head injury, possible LOC  TECHNIQUE:  Noncontrast CT scanning is performed through the brain. Dose reduction techniques were used.  Dose information is transmitted to the [facility] [employer] subcortical and deep periventricular white matter are noted. No significant abnormal parenchymal gradient susceptibility.    Patchy region of acute infarct in the left parietal lobe extending to the deep periventricular white matter and to the posterior l ICA Peak Systolic Velocity:  527.52 cm/s             Right Distal ICA Peak Systolic Velocity:  852.09 cm/s             Right ICA/CCA Velocity Ratio:  4.83                           Left CCA Peak Systolic Velocity:  75 cm/s             Left Proximal ICA Pea Localized mass effect and cortical swelling, but no sign of significant midline shift. No herniation or hydrocephalus. CONCLUSION:  Increase in the size of infarct. No significant midline shift. No herniation. No hydrocephalus.     Dictated by: Doc Kruse process.      Dictated by: Andre Mason MD on 1/13/2019 at 11:30     Approved by: Andre Mason MD            Xr Chest Ap Portable  (cpt=71045)    Result Date: 1/7/2019  PROCEDURE:  XR CHEST AP PORTABLE  (CPT=71045)  TECHNIQUE:  AP chest radiograph was obt and lateral view recommended. 2.  Elevated right hemidiaphragm suggesting volume loss in the right silvano thorax. Surgical clips over the right hilum suggest previous thoracotomy. Correlate with clinical history.      Dictated by: Janet Juarez MD on 1 hemorrhage, or abnormal enhancement. BASAL CISTERNS:  No subarachnoid hemorrhage or effacement. SKULL:  Negative. LEFT INTERNAL CAROTID:  There is 30-40% stenosis at the proximal internal carotid artery secondary calcified plaque.  EXTERNAL CAROTID:  No in 1 week. 4. Complete heart block (HCC)  -s/p pacemaker    5. Anemia, unspecified type  -stable, watching labs. 6. Acute cystitis without hematuria  -MRSA in urine, colonization,,resolved    7. MRSA infection  -as above.      8. Essential hypertens

## 2019-02-05 ENCOUNTER — SNF VISIT (OUTPATIENT)
Dept: INTERNAL MEDICINE CLINIC | Age: 84
End: 2019-02-05

## 2019-02-05 VITALS
HEART RATE: 69 BPM | RESPIRATION RATE: 18 BRPM | WEIGHT: 125 LBS | SYSTOLIC BLOOD PRESSURE: 137 MMHG | DIASTOLIC BLOOD PRESSURE: 70 MMHG | TEMPERATURE: 98 F | BODY MASS INDEX: 22 KG/M2

## 2019-02-05 DIAGNOSIS — E83.42 HYPOMAGNESEMIA: Primary | ICD-10-CM

## 2019-02-05 DIAGNOSIS — Z74.09 IMPAIRED FUNCTIONAL MOBILITY, BALANCE, GAIT, AND ENDURANCE: ICD-10-CM

## 2019-02-05 DIAGNOSIS — R53.1 GENERALIZED WEAKNESS: ICD-10-CM

## 2019-02-05 DIAGNOSIS — R47.01 EXPRESSIVE APHASIA: ICD-10-CM

## 2019-02-05 DIAGNOSIS — I69.320 APHASIA S/P CVA: ICD-10-CM

## 2019-02-05 DIAGNOSIS — Z98.890 HISTORY OF LEFT-SIDED CAROTID ENDARTERECTOMY: ICD-10-CM

## 2019-02-05 PROCEDURE — 99308 SBSQ NF CARE LOW MDM 20: CPT | Performed by: NURSE PRACTITIONER

## 2019-02-05 RX ORDER — MAGNESIUM 200 MG
400 TABLET ORAL 2 TIMES DAILY
COMMUNITY
Start: 2019-02-05 | End: 2019-02-06

## 2019-02-06 NOTE — PROGRESS NOTES
Anna Villanueva, 6/21/1934, 80year old, female    Chief Complaint:  Patient presents with: Follow - Up: s/p left MCA, CVA       Subjective:   81 y/o female with PMHx who lives at [address] [place] [address] who up one day and did not feel well.   She contacted a neighbor and wa PHYSICAL EXAM:  GENERAL HEALTH: well developed, well nourished, in no apparent distress  LINES, TUBES, DRAINS:  pacemaker  SKIN: pale, warm, dry  WOUND: +left side of neck incision-scabbed over,visible bump where pain pump on abdomen underneath skin; l consistent with acute CVA, carotid stenosis, worsening infarct on repeat MRI and UTI/Generalized Weakness  -PT/OT to eval and treat  -Tylenol 650 mg q6h prn for fever/pain, notify MD/NP if given for fever.  -Plavix 75 mg qd  -Fall Precautions  -Bleeding pr

## 2019-02-07 ENCOUNTER — SNF VISIT (OUTPATIENT)
Dept: INTERNAL MEDICINE CLINIC | Age: 84
End: 2019-02-07

## 2019-02-07 VITALS
HEART RATE: 72 BPM | OXYGEN SATURATION: 96 % | TEMPERATURE: 98 F | SYSTOLIC BLOOD PRESSURE: 110 MMHG | DIASTOLIC BLOOD PRESSURE: 65 MMHG | RESPIRATION RATE: 18 BRPM

## 2019-02-07 DIAGNOSIS — Z74.09 IMPAIRED FUNCTIONAL MOBILITY, BALANCE, GAIT, AND ENDURANCE: ICD-10-CM

## 2019-02-07 DIAGNOSIS — I69.320 APHASIA S/P CVA: ICD-10-CM

## 2019-02-07 DIAGNOSIS — E83.42 HYPOMAGNESEMIA: Primary | ICD-10-CM

## 2019-02-07 DIAGNOSIS — Z98.890 HISTORY OF LEFT-SIDED CAROTID ENDARTERECTOMY: ICD-10-CM

## 2019-02-07 DIAGNOSIS — R47.01 EXPRESSIVE APHASIA: ICD-10-CM

## 2019-02-07 PROCEDURE — 99307 SBSQ NF CARE SF MDM 10: CPT | Performed by: NURSE PRACTITIONER

## 2019-02-07 NOTE — PROGRESS NOTES
Jennifer Longmadonna, 6/21/1934, 80year old, female    Chief Complaint:  Patient presents with: Follow - Up: Left MCA, CVA       Subjective:   79 y/o female with PMHx who lives at [address] [place] [address] who up one day and did not feel well.   She contacted a neighbor and was br tactical cueing  Eating-Min assist with tactical cueing  Speech- worked on responsive naming 4/10, confrontation naming 3/10, object/picture identification 2/44 with visual, verbal, tactile cues. Needed constant redirection to focus on task.  Responses were acute synovitis upper or lower extremity; generalized weakness  EXTREMITIES/VASCULAR:no cyanosis, clubbing or edema, radial pulses 2+ and dorsalis pedal pulses 2+  NEUROLOGIC: intact; no sensorimotor deficit, reflexes normal, follows commands, ---+expressi MAI.  -Dr. Fadia Mondragon, Neurology on 2/22/19  -Dr. Stevan Henley after discharge from [place] [address]  -Dr. Miky Mendez, Cardiology on 2/19/19  -Shawn Mar PA-C 3/5/19, Urology      Ralph Harden, APRN  2/7/2019  1:29 p.m.

## 2019-02-12 ENCOUNTER — SNF VISIT (OUTPATIENT)
Dept: INTERNAL MEDICINE CLINIC | Age: 84
End: 2019-02-12

## 2019-02-12 VITALS
HEART RATE: 82 BPM | SYSTOLIC BLOOD PRESSURE: 150 MMHG | TEMPERATURE: 98 F | OXYGEN SATURATION: 96 % | RESPIRATION RATE: 18 BRPM | BODY MASS INDEX: 22 KG/M2 | WEIGHT: 125 LBS | DIASTOLIC BLOOD PRESSURE: 72 MMHG

## 2019-02-12 DIAGNOSIS — R30.9 VOIDING PAIN: ICD-10-CM

## 2019-02-12 DIAGNOSIS — I69.320 APHASIA S/P CVA: Primary | ICD-10-CM

## 2019-02-12 DIAGNOSIS — R47.01 EXPRESSIVE APHASIA: ICD-10-CM

## 2019-02-12 DIAGNOSIS — L08.9 SKIN PUSTULE: ICD-10-CM

## 2019-02-12 DIAGNOSIS — Z74.09 IMPAIRED FUNCTIONAL MOBILITY, BALANCE, GAIT, AND ENDURANCE: ICD-10-CM

## 2019-02-12 PROCEDURE — 99309 SBSQ NF CARE MODERATE MDM 30: CPT | Performed by: NURSE PRACTITIONER

## 2019-02-12 RX ORDER — CEPHALEXIN 500 MG/1
500 CAPSULE ORAL 4 TIMES DAILY
COMMUNITY
Start: 2019-02-12 | End: 2019-02-18

## 2019-02-19 ENCOUNTER — SNF VISIT (OUTPATIENT)
Dept: INTERNAL MEDICINE CLINIC | Age: 84
End: 2019-02-19

## 2019-02-19 DIAGNOSIS — Z74.09 IMPAIRED FUNCTIONAL MOBILITY, BALANCE, GAIT, AND ENDURANCE: ICD-10-CM

## 2019-02-19 DIAGNOSIS — L08.9 SKIN PUSTULE: ICD-10-CM

## 2019-02-19 DIAGNOSIS — R47.01 EXPRESSIVE APHASIA: ICD-10-CM

## 2019-02-19 DIAGNOSIS — I69.320 APHASIA S/P CVA: Primary | ICD-10-CM

## 2019-02-19 PROCEDURE — 99308 SBSQ NF CARE LOW MDM 20: CPT | Performed by: NURSE PRACTITIONER

## 2019-02-20 VITALS
HEART RATE: 78 BPM | DIASTOLIC BLOOD PRESSURE: 71 MMHG | RESPIRATION RATE: 19 BRPM | SYSTOLIC BLOOD PRESSURE: 119 MMHG | BODY MASS INDEX: 22 KG/M2 | WEIGHT: 125 LBS | TEMPERATURE: 98 F | OXYGEN SATURATION: 96 %

## 2019-02-21 ENCOUNTER — SNF VISIT (OUTPATIENT)
Dept: INTERNAL MEDICINE CLINIC | Age: 84
End: 2019-02-21

## 2019-02-21 DIAGNOSIS — I69.320 APHASIA S/P CVA: Primary | ICD-10-CM

## 2019-02-21 DIAGNOSIS — R47.01 EXPRESSIVE APHASIA: ICD-10-CM

## 2019-02-21 DIAGNOSIS — L08.9 SKIN PUSTULE: ICD-10-CM

## 2019-02-21 DIAGNOSIS — Z74.09 IMPAIRED FUNCTIONAL MOBILITY, BALANCE, GAIT, AND ENDURANCE: ICD-10-CM

## 2019-02-21 PROCEDURE — 99307 SBSQ NF CARE SF MDM 10: CPT | Performed by: NURSE PRACTITIONER

## 2019-02-21 NOTE — PROGRESS NOTES
Herrera Villanueva, 6/21/1934, 80year old, female    Chief Complaint:  Patient presents with: Follow - Up: s/p MCA, CVA, abdominal boil       Subjective:   81 y/o female with PMHx who lives at [address] [place] [address] who up one day and did not feel well.   She contacted a analisa cough, SOB, dyspnea, angina, palpitations, n/v, diarrhea, constipation, and no s/s urinary symptoms.     Objective:  /71   Pulse 78   Temp 97.5 °F (36.4 °C)   Resp 19   Wt 125 lb   SpO2 96%   BMI 22.15 kg/m²     PHYSICAL EXAM:  GENERAL HEALTH: well de Hj=606  MPV=11.5    CMP:2/19/19  Glucose=99  BUN=8  Cr=0.67  Bilirubin=0.75  Protein=6.5  Albumin=3.2  Sodium=138  Potassium=4.5  Chloride=101  CO2=29  ALT=9  AST=16  Alk Phosphatase=81  Calcium=8.4  EGFR=>60    Assessment and plan:  Acute left MCA CVA 1/1 APRN  2/19/2019  5:55 p.m.

## 2019-02-22 ENCOUNTER — OFFICE VISIT (OUTPATIENT)
Dept: NEUROLOGY | Facility: CLINIC | Age: 84
End: 2019-02-22
Payer: MEDICARE

## 2019-02-22 VITALS
HEIGHT: 63 IN | SYSTOLIC BLOOD PRESSURE: 108 MMHG | WEIGHT: 125 LBS | HEART RATE: 84 BPM | RESPIRATION RATE: 14 BRPM | DIASTOLIC BLOOD PRESSURE: 62 MMHG | BODY MASS INDEX: 22.15 KG/M2

## 2019-02-22 DIAGNOSIS — Z98.890 S/P CAROTID ENDARTERECTOMY: ICD-10-CM

## 2019-02-22 DIAGNOSIS — I63.512 ARTERIAL ISCHEMIC STROKE, MCA (MIDDLE CEREBRAL ARTERY), LEFT, ACUTE (HCC): ICD-10-CM

## 2019-02-22 PROCEDURE — 99213 OFFICE O/P EST LOW 20 MIN: CPT | Performed by: OTHER

## 2019-02-22 NOTE — PROGRESS NOTES
Tyra Mateo [facility], 6/21/1934, 80year old, female    Chief Complaint:  No chief complaint on file. Subjective:   81 y/o female with PMHx who lives at [address] [place] [address] who up one day and did not feel well. She contacted a neighbor and was brought to the ER.  She ha s/s urinary symptoms.     Objective:  BP:  138/68, HR: 78, Resp: 18, Sats: 96%, Temp: 97.6    PHYSICAL EXAM:  GENERAL HEALTH: well developed, well nourished, in no apparent distress  LINES, TUBES, DRAINS:  pacemaker  SKIN: pale, warm, dry  WOUND: +left side extended to 2/27/19     S/P [employer] pacemaker 1/9/19 for syncope with complete heart block/eHTN/DL  -Vitals q shift  -Atorvastatin 40 mg qd  -ASA 81 mg qd  -Lisinopril 10 mg qd, hold for sbp<100 or hr<60     Questionable seizure activity, EEG abn

## 2019-02-25 NOTE — PROGRESS NOTES
HPI:    Patient ID: Jes Vincent is a 80year old female. HPI  Patient presents for follow up for left MCA stroke and s/p left CEA.  She was admitted on 1/13 with aphasia and found to have left posterior frontal and temporal infarct and moderate lef topically 2 (two) times daily. Disp:  Rfl:    lisinopril 10 MG Oral Tab Take 1 tablet (10 mg total) by mouth daily. Disp: 30 tablet Rfl: 11   acetaminophen 325 MG Oral Tab Take 2 tablets (650 mg total) by mouth every 4 (four) hours as needed.  Disp:  Rfl: 0 height 63\", weight 125 lb. General: A 80year old female, sitting in wheel chair  HEENT: Normocephalic and atraumatic. Cardiovascular: Normal rate, regular rhythm and normal heart sounds.     Pulmonary/Chest: Effort normal and breath sounds normal. types were placed in this encounter.       Meds This Visit:  Requested Prescriptions      No prescriptions requested or ordered in this encounter       Imaging & Referrals:  None       #[id number]

## 2019-02-26 ENCOUNTER — SNF DISCHARGE (OUTPATIENT)
Dept: INTERNAL MEDICINE CLINIC | Age: 84
End: 2019-02-26

## 2019-02-26 VITALS
DIASTOLIC BLOOD PRESSURE: 78 MMHG | HEART RATE: 74 BPM | RESPIRATION RATE: 18 BRPM | TEMPERATURE: 98 F | OXYGEN SATURATION: 96 % | SYSTOLIC BLOOD PRESSURE: 138 MMHG

## 2019-02-26 DIAGNOSIS — L08.9 SKIN PUSTULE: ICD-10-CM

## 2019-02-26 DIAGNOSIS — R47.01 EXPRESSIVE APHASIA: ICD-10-CM

## 2019-02-26 DIAGNOSIS — I69.320 APHASIA S/P CVA: Primary | ICD-10-CM

## 2019-02-26 DIAGNOSIS — Z74.09 IMPAIRED FUNCTIONAL MOBILITY, BALANCE, GAIT, AND ENDURANCE: ICD-10-CM

## 2019-02-26 PROCEDURE — 99316 NF DSCHRG MGMT 30 MIN+: CPT | Performed by: NURSE PRACTITIONER

## 2019-02-27 NOTE — PROGRESS NOTES
Tammy Villanueva, 6/21/1934, 80year old, female is being discharged from Facility: [facility] of [address]    Date of Admission:1/27/19    Date of Anticipated Discharge:2/28/19                                 Admitting Diag cerumen;+hearing impaired  NECK: supple; FROM; no JVD, no TMG, no carotid bruits-Left side of neck slightly distended; s/p left CEA  BREAST: ---deferred  RESPIRATORY:---diminished melania  CARDIOVASCULAR: S1, S2 normal, RRR; no S3, no S4; , no click, no murmur syncope with complete heart block/eHTN/DL  -Atorvastatin 40 mg qd  -ASA 81 mg qd  -Lisinopril 10 mg qd     Questionable seizure activity, EEG abnormal but no seizure activity seen, no ADD started-stable    MRSA nares-resolved     COPD w/o exacerbation  -Ve

## 2019-03-05 ENCOUNTER — ASST LIVING (OUTPATIENT)
Dept: FAMILY MEDICINE CLINIC | Facility: CLINIC | Age: 84
End: 2019-03-05

## 2019-03-05 VITALS
SYSTOLIC BLOOD PRESSURE: 106 MMHG | OXYGEN SATURATION: 98 % | TEMPERATURE: 98 F | DIASTOLIC BLOOD PRESSURE: 66 MMHG | RESPIRATION RATE: 18 BRPM | HEART RATE: 70 BPM

## 2019-03-05 DIAGNOSIS — J44.9 COPD WITH ASTHMA (HCC): ICD-10-CM

## 2019-03-05 DIAGNOSIS — I63.9 APHASIA DUE TO ACUTE CEREBROVASCULAR ACCIDENT (CVA) (HCC): Primary | ICD-10-CM

## 2019-03-05 DIAGNOSIS — R47.01 APHASIA DUE TO ACUTE CEREBROVASCULAR ACCIDENT (CVA) (HCC): Primary | ICD-10-CM

## 2019-03-05 DIAGNOSIS — I44.2 COMPLETE HEART BLOCK (HCC): ICD-10-CM

## 2019-03-05 DIAGNOSIS — I10 ESSENTIAL HYPERTENSION: ICD-10-CM

## 2019-03-05 DIAGNOSIS — Z98.890 S/P CAROTID ENDARTERECTOMY: ICD-10-CM

## 2019-03-05 RX ORDER — OMEPRAZOLE 20 MG/1
20 CAPSULE, DELAYED RELEASE ORAL
COMMUNITY
End: 2019-01-01

## 2019-03-05 RX ORDER — SACCHAROMYCES BOULARDII 250 MG
250 CAPSULE ORAL 2 TIMES DAILY
COMMUNITY
End: 2019-01-01

## 2019-03-05 RX ORDER — ALBUTEROL SULFATE 90 UG/1
2 AEROSOL, METERED RESPIRATORY (INHALATION) EVERY 4 HOURS PRN
COMMUNITY

## 2019-03-07 NOTE — TELEPHONE ENCOUNTER
Per home provider patient is c/o pain at HS. Does not have an order for any pain Rx. Left detailed message for home provider on phone. Need more details on pain location . how long ,how severe is she taking anything ? Patient was just seen.  3/519

## 2019-03-11 NOTE — TELEPHONE ENCOUNTER
Patient's daughter calling YO (Michelle Neumann per HIPPA) requesting Neuro-psych referral.    Madelynin Score 2/22/2019. Pended Referral for provider review and routed to provider.

## 2019-03-11 NOTE — PROGRESS NOTES
HPI:    Neymar Patel is a 80year old female here today for hospital follow up.    She was discharged from SNF,  [facility] to Assisted living   Admit Date:1/27  Discharge Date: 2/28  Hospital Discharge Diagnosis:   left MCA CVA  Interactive con Inhalation Aero Soln Inhale into the lungs every 6 (six) hours as needed for Wheezing. lisinopril 10 MG Oral Tab Take 1 tablet (10 mg total) by mouth daily. Clopidogrel Bisulfate 75 MG Oral Tab Take 1 tablet (75 mg total) by mouth daily.    brimonidine surgical history that includes hip replacement surgery; cataract; total hip replacement; Colectomy; and CAROTID ENDARTERECTOMY (Left, 1/23/2019). She family history is not on file. She  reports that she has quit smoking.  she has never used smokeless t and all orders for this visit:    Aphasia due to acute cerebrovascular accident (CVA) ([address]  S/P carotid endarterectomy  Complete heart block ([address]  Essential hypertension  COPD with asthma [facility])    Patient admitted to assisted living  Home health certific

## 2019-03-12 NOTE — PROGRESS NOTES
HOME HEALTH NURSE-called    PATIENT HAVING NEUROPATHIC PAIN IN LOWER EXTREMITIES, SHE USED TO BE ON GABAPENTIN THAT WAS DISCONTINUED IN [address]      PLAN-- RESTARTED GABAPENTIN TWICE A DAY  REEVALUATION IN 2 WEEKS,    [address]

## 2019-03-15 NOTE — TELEPHONE ENCOUNTER
LMTCB to inform daughter neuropsych referral has been signed. Does she want to pick it up or have it mailed.

## 2019-03-15 NOTE — TELEPHONE ENCOUNTER
S-daughter, Branden Hudson (ok per HIPAA) calling regarding pt taking sleep medication    B-being followed for stroke in January. A-daughter states that pt currently has 24 hour caregiver with her. Notes that over the last several weeks has had trouble sleeping.  C

## 2019-03-20 NOTE — TELEPHONE ENCOUNTER
Per Dr Paty Anderson:  [id number] Melvi Shine to take low dose sleeping pill but would like to know which one it is    Daughter informed of above and will call back with name and dose of sleeping medication.

## 2019-03-26 NOTE — PROGRESS NOTES
HPI:    Patient ID: Eric Watts is a 80year old female. HPI    Patient is a 80year old female who presents for follow up along with her daughter for left MCA stroke and s/p left CEA.  She was admitted on 1/13 with aphasia and found to have left p Negative. Musculoskeletal: Positive for gait problem. Neurological: Positive for speech difficulty and weakness. Psychiatric/Behavioral: Positive for decreased concentration. All other systems reviewed and are negative.              Current Outpati every 6 (six) months. Disp:  Rfl:    Morphine Sulfate Microinfusion 500 MG/20ML (25 MG/ML) Injection Solution 11.995 mg/day by Intrathecal route. Disp:  Rfl:    aspirin 81 MG Oral Tab Take 81 mg by mouth daily.  Disp:  Rfl:    Cholecalciferol (VITAMIN D3) Left Arm:0  5b. Right Arm:1  6a. Left Le  6b. Right Le  7. Limb Ataxia: 0  8. Sensory: 0  9. Best Language:1  10. Dysarthria:o  11.  Extinction and Inattention :0            ASSESSMENT/PLAN:   Chronic ischemic left mca stroke  (primary encounter diagn

## 2019-03-26 NOTE — PROGRESS NOTES
The patient is here for a stroke follow-up. The patient states her memory has gotten better, the patient has not fallen since her stroke. The patient is having trouble with word finding.

## 2019-04-02 NOTE — PROGRESS NOTES
VITALS:  /70   Pulse 77   Temp 98.6 °F (37 °C) (Oral)   Resp 16   Ht 63\"   Wt 131 lb   SpO2 95%   BMI 23.21 kg/m²     CC--Patient presents with:  Bladder Problem      H. PRamyI Bjcharlette Alvarez is a 80year old female who is being seen here today compl Carbonate-Vit D-Min (CALCIUM 1200) 0100-6375 MG-UNIT Oral Chew Tab Chew 1 tablet by mouth daily. Disp: 30 tablet Rfl: 5   saccharomyces boulardii 250 MG Oral Cap Take 250 mg by mouth 2 (two) times daily.  Disp:  Rfl:    omeprazole 20 MG Oral Capsule Delayed mouth daily with breakfast. Disp:  Rfl:    Probiotic Product (PROBIOTIC COLON SUPPORT OR) Take 1 capsule by mouth daily. Disp:  Rfl:    Polyethylene Glycol 3350 (MIRALAX OR) Take 17 g by mouth daily.    Disp:  Rfl:        Review of Systems:   Constitution [id number]    Electronically signed

## 2019-04-10 NOTE — TELEPHONE ENCOUNTER
Cheryl from [address] [place] [address] called asking to speak to nurse immediately. States they are still waiting for orders for a medication. Stacey Michel this is a new admit for Dr. Angel Bertrand.

## 2019-04-10 NOTE — TELEPHONE ENCOUNTER
Returned call to [facility] nurse. Requesting order for Vitamin B complex orally one tab daily.  Patient is new to [facility] as of 3/5/19    Dr. Baljit Colin,  Please advise     Order pending

## 2019-04-29 NOTE — TELEPHONE ENCOUNTER
Name from pharmacy: OXYBUTYNIN 5MG TABLETS          Will file in chart as: OXYBUTYNIN CHLORIDE 5 MG Oral Tab    Sig: TAKE 1 TABLET BY MOUTH AT BEDTIME    Disp:  30 tablet    Refills:  0    Start: 4/27/2019    Class: Normal    Requested on: 4/27/2019    [facility]

## 2019-05-01 NOTE — TELEPHONE ENCOUNTER
Medical Record Request Received    Date received in office: 5-1-19    Requested from: [employer]     Records to be sent to: [address], [address] [place] [address]           Date request sent to [employer]:5-2-19

## 2019-05-03 NOTE — TELEPHONE ENCOUNTER
Daughter wanted Dr Baljit Colin to know that mother has appt at [address] on Tuesday. Pt unable to clearly communicate well so asking Dr to check her feet and ankles, has been noting swelling x 1 week.

## 2019-05-03 NOTE — TELEPHONE ENCOUNTER
Daughter, Elba Stone, ok per FYI, calling to give information regarding patient. Transferred to triage voicemail as it's in regards to upcoming appt with Dr Yamini Rea and nursing home / symptoms.

## 2019-05-19 NOTE — PROGRESS NOTES
VITALS:  /54   Pulse 62   Temp 97.7 °F (36.5 °C) (Tympanic)   Resp 18   SpO2 97%     CC--Patient presents with:  Edema: BLE       H. P.I Nikolai Osborn is a 80year old female  With history of recent stroke and expressive aphasia comes in today wit Oral Tab TAKE 1 TABLET BY MOUTH AT BEDTIME Disp: 30 tablet Rfl: 0   omeprazole 20 MG Oral Capsule Delayed Release Take 1 capsule (20 mg total) by mouth every morning before breakfast. Disp: 90 capsule Rfl: 3   B Complex Vitamins (VITAMIN B COMPLEX) Oral Ta MG/ML) Injection Solution 11.995 mg/day by Intrathecal route. Disp:  Rfl:    aspirin 81 MG Oral Tab Take 81 mg by mouth daily. Disp:  Rfl:    Cholecalciferol (VITAMIN D3) 1000 units Oral Cap Take 1 tablet by mouth daily.  Disp:  Rfl:    Ferrous Sulfate 325 hypertension       I had a long discussion with the patient regarding the differential diagnosis of bilateral pedal edema the ankles bilaterally and venous insufficiency, as well as inactivity   is reluctant to start her on any diuretics due to her low blo

## 2019-05-22 NOTE — TELEPHONE ENCOUNTER
Per [address] patient has abdominal pain yesterday. /98 P80 R18. Confused later /110 P90 911 called patient refused to go to hospital. Faxed to [address] to continue to monitor per PCP.

## 2019-05-28 NOTE — TELEPHONE ENCOUNTER
LOV 5-7-19 Assisted Living    LAST LAB     LAST RX  4-29-19 x 30    Next OV No future appointments.       PROTOCOL  NONE

## 2019-05-29 NOTE — TELEPHONE ENCOUNTER
Name from pharmacy: LISINOPRIL 10MG TABLETS          Will file in chart as: LISINOPRIL 10 MG Oral Tab    Sig: TAKE 1 TABLET BY MOUTH EVERY DAY    Disp:  30 tablet    Refills:  0    Start: 5/28/2019    Class: Normal    Requested on: 5/28/2019    Last ordere

## 2019-06-14 NOTE — TELEPHONE ENCOUNTER
I do not see a DOS where this patient was seen at the clinic. Can I give out External Facility info?

## 2019-06-14 NOTE — TELEPHONE ENCOUNTER
Shaniqua Joe with [facility] called and LVM at 10:05 am stating she needs to know the last DOS this patient was seen. Forwarded VM to triage for further details.

## 2019-06-20 NOTE — TELEPHONE ENCOUNTER
LOV 5-7-19    LAST LAB    LAST RX 1-26-19 30*1     Next OV No future appointments.     PROTOCOL  Name from pharmacy: CLOPIDOGREL 75MG TABLETS          Will file in chart as: CLOPIDOGREL BISULFATE 75 MG Oral Tab    Sig: TAKE 1 TABLET BY MOUTH EVERY DAY    Jessie Lo

## 2019-06-25 NOTE — PROGRESS NOTES
VITALS:  /72   Pulse 60   Temp 97.8 °F (36.6 °C) (Temporal)   Resp 18   Wt 140 lb   BMI 24.80 kg/m²     CC--Patient presents with:  Edema  Weight Check: gain of 15 lbs in 3 months      H. P.I Dustin Casper is a 80year old female  With history of tablet Rfl: 0   OXYBUTYNIN CHLORIDE 5 MG Oral Tab TAKE 1 TABLET BY MOUTH AT BEDTIME Disp: 30 tablet Rfl: 5   CALCIUM PLUS VITAMIN D3 600-500 MG-UNIT Oral Cap TK 1 C PO D Disp:  Rfl: 5   omeprazole 20 MG Oral Capsule Delayed Release Take 1 capsule (20 mg to MG Oral Tab Take 81 mg by mouth daily. Disp:  Rfl:    Cholecalciferol (VITAMIN D3) 1000 units Oral Cap Take 1 tablet by mouth daily.  Disp:  Rfl:    Ferrous Sulfate 325 (65 Fe) MG Oral Tab Take 325 mg by mouth daily with breakfast. Disp:  Rfl:    Probiotic tablet (25 mg total) by mouth daily.             Ari Escobedo MD   [address] [place] [address]    Electronically signed

## 2019-07-05 NOTE — TELEPHONE ENCOUNTER
LOV 06/25/2019    LAST LAB 04/03/2019    LAST RX 03/10/2019 Given 90 tablets with no refills     Next OV No future appointments.       PROTOCOL Failed

## 2019-07-27 NOTE — TELEPHONE ENCOUNTER
LOV 70-20-19    LAST LAB    LAST RX 5-29-19 30*0    Next OV No future appointments.     PROTOCOL    Name from pharmacy: LISINOPRIL 10MG TABLETS          Will file in chart as: LISINOPRIL 10 MG Oral Tab    Sig: TAKE 1 TABLET BY MOUTH EVERY DAY    Disp:  27 t

## 2019-08-19 NOTE — TELEPHONE ENCOUNTER
Hypertension Medications Protocol Failed8/19 3:09 PM   Appointment in past 6 or next 3 months    CMP or BMP in past 12 months    Last serum creatinine< 2.0     LOV 6/25/19     LAST LAB  7/9/19     LAST RX 6/25/19 30 tabs with 1 refill     Next OV  No futur

## 2019-08-20 NOTE — PROGRESS NOTES
VITALS:  /62   Pulse 80   Temp 98.6 °F (37 °C) (Temporal)   Resp 18   Wt 135 lb   SpO2 96%   BMI 23.91 kg/m²     CC--Patient presents with:  Bloating      H. P.I Angelina Lopez is a 80year old female  History of expressive aphasia comes in today c (325 mg total) by mouth daily with breakfast. Disp: 90 tablet Rfl: 0   saccharomyces boulardii 250 MG Oral Cap Take 1 capsule (250 mg total) by mouth 2 (two) times daily.  Disp: 180 capsule Rfl: 0   LISINOPRIL 10 MG Oral Tab TAKE 1 TABLET BY MOUTH EVERY DAY denosumab 60 MG/ML Subcutaneous Solution Inject 60 mg into the skin every 6 (six) months. Disp:  Rfl:    Morphine Sulfate Microinfusion 500 MG/20ML (25 MG/ML) Injection Solution 11.995 mg/day by Intrathecal route.  Disp:  Rfl:    aspirin 81 MG Oral Tab discontinue iron sulphate and florastar Probiotic   to continue MiraLAX and Colace   I will check her CBC and CMP tomorrow   patient is advised to push fluids   and instructions given to the assisted living staff   followup in 2 weeks      Carisa Marion MD

## 2019-08-23 PROBLEM — R79.89 ABNORMAL LFTS: Status: ACTIVE | Noted: 2019-01-01

## 2019-08-23 NOTE — ED INITIAL ASSESSMENT (HPI)
Patient arrives by ems from [facility] in [facility] for abnormal labs drawn today. Elevated WBC, low sodium and elevated potassium. Patient a0x1, which is baseline per staff. Denies any complaints.

## 2019-08-23 NOTE — ED PROVIDER NOTES
Patient Seen in: [facility] Emergency Department    History   Patient presents with:  Abnormal Result (metabolic, cardiac)    Stated Complaint: abnormal labs, elevated potassium and low sodium    HPI    Patient is a pleasant 24-year-old female avni person, place, and time. She appears well-developed and well-nourished. HENT:   Head: Normocephalic and atraumatic. Mouth/Throat: Oropharynx is clear and moist.   Eyes: Pupils are equal, round, and reactive to light.  Conjunctivae and EOM are normal. CBC W/ DIFFERENTIAL - Abnormal; Notable for the following components:    WBC 16.8 (*)     HGB 11.9 (*)     Neutrophil Absolute Prelim 12.19 (*)     Neutrophil Absolute 12.19 (*)     Monocyte Absolute 2.21 (*)     All other components within normal limits limited historian due to dementia. Will repeat labs, check UA. Admission disposition: 8/23/2019 11:43 PM         Update at 9 PM.  Labs demonstrate mild hyponatremia, hypochloremia. LFTs are elevated as well, etiology of this is unclear.   Bilirubin is

## 2019-08-24 NOTE — PLAN OF CARE
Problem: Patient/Family Goals  Goal: Patient/Family Long Term Goal  Description  Patient's Long Term Goal: return to nursing home    Interventions:  - medications as ordered   - See additional Care Plan goals for specific interventions  Outcome: Progress

## 2019-08-24 NOTE — PROGRESS NOTES
[facility] Pharmacy Note:  Renal Dose Adjustment for Metoclopramide (REGLAN)    Katie Villanueva has been prescribed Metoclopramide (REGLAN) 10 mg every 8 hours as needed for nausea, vomiting.     Estimated Creatinine Clearance: 31 mL/min (A) (based on SCr of 1.0

## 2019-08-24 NOTE — PROGRESS NOTES
[facility] HOSPITALIST  Progress Note     Belindanehal Bearden Patient Status:  Observation    1934 MRN [id number]   [facility] 4NW-A Attending Dana Kirkland MD   Hosp Day # 0 PCP Leonel Alberto MD     Chief Complaint: Abnormal labs    S: Micky Phillips Daily   • Clopidogrel Bisulfate  75 mg Oral Daily   • ferrous sulfate  325 mg Oral Daily with breakfast   • gabapentin  300 mg Oral BID   • lisinopril  10 mg Oral Daily   • Oxybutynin Chloride  5 mg Oral QPM   • Pantoprazole Sodium  20 mg Oral QAM AC   • N

## 2019-08-24 NOTE — H&P
[facility]  [facility]     [facility]:  [facility]    1934 MRN [id number]   Location [facility] Attending Omar Coffey MD   Hosp Day # 0 PCP Lucia Vale MD     Chief Complaint: tablet Rfl: 0   spironolactone 25 MG Oral Tab Take 1 tablet (25 mg total) by mouth daily.  Disp: 30 tablet Rfl: 1   Ferrous Sulfate 325 (65 Fe) MG Oral Tab Take 1 tablet (325 mg total) by mouth daily with breakfast. Disp: 90 tablet Rfl: 0   saccharomyces yaz mouth daily. Disp:  Rfl:    denosumab 60 MG/ML Subcutaneous Solution Inject 60 mg into the skin every 6 (six) months. Disp:  Rfl:    Morphine Sulfate Microinfusion 500 MG/20ML (25 MG/ML) Injection Solution 11.995 mg/day by Intrathecal route.  Disp:  Rfl: input(s): PTP, INR in the last 168 hours. Recent Labs   Lab 08/23/19  1840   TROP <0.045       Imaging: Imaging data reviewed in Epic. ASSESSMENT / PLAN:     1. UTI  1. IV Abx  2. Monitor Urine Cx  2. HypoNatremia  1. IVF  3. Elevated LFT  1.  Dandre Ruano

## 2019-08-24 NOTE — ED NOTES
Report given to [facility] RN at this time. Patient remains alert to baseline, remains updated with plan of care. Labs pending. Xray has been at bedside.

## 2019-08-24 NOTE — PLAN OF CARE
Problem: SAFETY ADULT - FALL  Goal: Free from fall injury  Description  INTERVENTIONS:  - Assess pt frequently for physical needs  - Identify cognitive and physical deficits and behaviors that affect risk of falls.   - Tryon fall precautions as indica

## 2019-08-25 NOTE — PHYSICAL THERAPY NOTE
PT order received, however per RN Nicholas Duane, pt is currently being DC back to [facility]. No further f/u required at this time.

## 2019-08-25 NOTE — PLAN OF CARE
Assumed care of Pt at 2330. Pt alert to self. Pt confused. VSS and afebrile. Pt with nonproductive cough. Isolation precautions maintained. Pt remains on fall precautions. All needs attended to. No acute events overnight. Call light within reach.

## 2019-08-25 NOTE — PROGRESS NOTES
Patient discharged to home explained all medications and follow up appts, also called report to [facility].

## 2019-08-25 NOTE — PROGRESS NOTES
[facility]OSPITALIST  Progress Note     Dock Finer Patient Status:  Observation    1934 MRN [id number]   [facility] 4NW-A Attending Britney Clemens MD   Hosp Day # 0 PCP Charo Washington MD     Chief Complaint: Abnormal labs    S: Matt Olivares 08/23/19  1840   TROP <0.045            Imaging: Imaging data reviewed in Epic.     Medications:   • aspirin  81 mg Oral Daily   • Clopidogrel Bisulfate  75 mg Oral Daily   • ferrous sulfate  325 mg Oral Daily with breakfast   • gabapentin  300 mg Oral BID

## 2019-08-26 NOTE — DISCHARGE SUMMARY
[facility] HOSPITALIST  DISCHARGE SUMMARY     Anna Villanueva Patient Status:  Observation    1934 MRN [id number]   [facility] 4NW-A Attending No att. providers found   Hosp Day # 0 PCP Keke Landin MD     Date of Admission: 2019  D 325 MG Tabs  Commonly known as:  TYLENOL      Take 2 tablets (650 mg total) by mouth every 4 (four) hours as needed.    Refills:  0     Albuterol Sulfate  (90 Base) MCG/ACT Aers      Inhale 2 puffs into the lungs every 4 (four) hours as needed for [facility] spironolactone 25 MG Tabs  Commonly known as:  ALDACTONE      Take 1 tablet (25 mg total) by mouth daily. Quantity:  30 tablet  Refills:  1     TRAVATAN Z 0.004 % Soln  Generic drug:  Travoprost (ALEXANDR Free)      Place 1 drop into both eyes nightly.    Re

## 2019-08-26 NOTE — TELEPHONE ENCOUNTER
Per Hospitalist note 8/25/19 (day of discharge)    ASSESSMENT / PLAN:      1. UTI, on IV abx, UCx negative, d/c IV abx    No Cipro on discharge med list.    Called nurse at [facility] and instructed to discontinue Cipro.

## 2019-08-26 NOTE — TELEPHONE ENCOUNTER
Sky Worley would like to know if you want the patient to continue taking the Cipro.  She was in the hospital.

## 2019-08-29 NOTE — PROGRESS NOTES
HPI:    Rachel Hyde is a 80year old female here today for hospital follow up.    She was discharged from Inpatient hospital, [facility] to Assisted living   Admit Date: 8/23/2019  Discharge Date: 8/25/2019  Hospital Discharge Diagnosis: Hyponatr drop 2 (two) times daily. Psyllium (REGULOID) 0.52 g Oral Cap Take 2 capsules by mouth daily.    Pantoprazole Sodium 20 MG Oral Tab EC Take 1 tablet (20 mg total) by mouth every morning before breakfast.   spironolactone 25 MG Oral Tab Take 1 tablet (25 m past medical history of Back pain, Back problem, Cancer ([facility] [address] Cataract, Depression, Essential hypertension, Glaucoma, Hearing impairment, High blood pressure, Incontinence, Muscle weakness, Pneumonia due to organism, Thyroid disease, and Visual impairment tenderness  MUSCULOSKELETAL: back is not tender, FROM of the extremities  EXTREMITIES: no cyanosis, clubbing or edema  NEURO: Oriented times three, cranial nerves are intact, motor and sensory are grossly intact  Expressive aphasia  ASSESSMENT/ PLAN:   Davina

## 2019-09-03 NOTE — PROGRESS NOTES
Yesika Villanueva Author: Mecca Resendez MD     1934 MRN [id number]   [facility]  Admission 19      Last Hospital Discharge 19 PCP Herbert Marmolejo of Discharge  [facility]--Patient presents w Frequency: Never    Drug use: No      ALLERGIES:    Hctz [Hydrochloroth*    OTHER (SEE COMMENTS)    Comment:Per  Pt affected the pancreas  Lactose                 DIARRHEA    CURRENT MEDICATIONS     Current Outpatient Medications:  Brimonidine Tartrate 0.1 constipation. Disp:  Rfl:    denosumab 60 MG/ML Subcutaneous Solution Inject 60 mg into the skin every 6 (six) months. Disp:  Rfl:    Morphine Sulfate Microinfusion 500 MG/20ML (25 MG/ML) Injection Solution 11.995 mg/day by Intrathecal route.  Disp:  Rfl: medications, lisinopril with the blood pressure is under 100  Blood work reviewed with the patient  We will continue to monitor the labs recheck CBC CMP in 2 weeks  I would like to follow-up with the patient is 1 month      Judy Santoro MD   [facility]

## 2019-09-14 PROBLEM — R41.82 ALTERED MENTAL STATUS: Status: ACTIVE | Noted: 2019-01-01

## 2019-09-14 PROBLEM — R41.82 ALTERED MENTAL STATUS, UNSPECIFIED ALTERED MENTAL STATUS TYPE: Status: ACTIVE | Noted: 2019-01-01
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
## 2019-09-14 NOTE — CONSULTS
[facility]    Report of Consultation    Herrera Boudreauxniravmihirmadonna Patient Status:  Observation    1934 MRN [id number]   [facility] 4NW-A Attending Karoline Emanuel MD   Hosp Day # 0 PCP Miguel A Pierson MD     Date of Admission:  2019 Continuous  •  Enoxaparin Sodium (LOVENOX) 40 MG/0.4ML injection 40 mg, 40 mg, Subcutaneous, Daily    Review of Systems:  A 10-point system was reviewed. Pertinent positives and negatives are noted in HPI.       Physical Exam:  Blood pressure 92/46, pulse Laceration of scalp, initial encounter     Tetanus-diphtheria (Td) vaccination     Anemia, unspecified type     Complete heart block (HCC)     Encephalopathy, ISCHEMIC     Aphasia due to acute cerebrovascular accident (CVA) ([facility] [address]     Stenosis of carotid ar

## 2019-09-14 NOTE — ED PROVIDER NOTES
Patient Seen in: [facility] Emergency Department    History   Patient presents with:  Fall (musculoskeletal, neurologic)  Altered Mental Status (neurologic)    Stated Complaint: fall    HPI    59-year-old female past medical history of hypertension Current:/70 (BP Location: Right arm)   Pulse 63   Temp 98.3 °F (36.8 °C) (Oral)   Resp 18   Ht 165.1 cm (5' 5\")   Wt 57.2 kg   SpO2 98%   BMI 21.00 kg/m²         Physical Exam   Constitutional: She appears well-developed and well-nourished. WITH PLATELET.   Procedure                               Abnormality         Status                     ---------                               -----------         ------                     CBC W/ DIFFERENTIAL[[id number]]          Abnormal            Final this time. No further recommendations at this time.     Admission disposition: 9/14/2019  6:38 AM                 Disposition and Plan     Clinical Impression:  Altered mental status, unspecified altered mental status type  (primary encounter diagnosis)  F

## 2019-09-14 NOTE — H&P
[facility]     Remona Schlatter Warschkow Patient Status:  Emergency    1934 MRN [id number]   Location [address] Attending Tasha Bhardwaj, [address] [place] [address] Day # 0 PCP Lucia Vale MD     Chief Compla Sodium 20 MG Oral Tab EC Take 1 tablet (20 mg total) by mouth every morning before breakfast. Disp: 90 tablet Rfl: 2   spironolactone 25 MG Oral Tab Take 1 tablet (25 mg total) by mouth daily.  Disp: 30 tablet Rfl: 1   LISINOPRIL 10 MG Oral Tab TAKE 1 TABLE Polyethylene Glycol 3350 (MIRALAX OR) Take 17 g by mouth daily. Disp:  Rfl:        Review of Systems:   A comprehensive 14 point review of systems was completed. Pertinent positives and negatives noted in the HPI.     Physical Exam:    /50   Pu stable  6. H/o carotid dz  7. H/o CVA  8. Chronic pain pump  9. Dementia? Quality:  · DVT Prophylaxis: lovenox   · CODE status: DNR  · Daly: none    Plan of care discussed with pt and ER.     Wai Masterson MD  9/14/2019

## 2019-09-14 NOTE — PROGRESS NOTES
[facility] HOSPITALIST  Progress Note     Vick Pablo Patient Status:  Observation    1934 MRN [id number]   [facility] 4NW-A Attending Alfredo Molina MD   Hosp Day # 0 PCP Miguel A Pierson MD     Chief Complaint: AMS    S: Patient wit 40 mg Subcutaneous Daily       ASSESSMENT / PLAN:     1. Altered mental status   1. CT head reviewed  2. IVF  3. PT OT   4. Neuro to eval  5. ? Due to Na  2. Hyponatremia   1. Check urine osm/Na  2. IVF  3. Renal to eval  3. Elevated LFTs,monitor   4.  HTN,

## 2019-09-14 NOTE — CONSULTS
[facility]  Report of Consultation    Kwame Longmadonna Patient Status:  Observation    1934 MRN [id number]   [facility] 4NW-A Attending Kristeen Hashimoto, MD   Hosp Day # 0 PCP José Luis Hensley MD       Assessment / Plan:    1) Nahomy atorvastatin (LIPITOR) tab 40 mg, 40 mg, Oral, Nightly  •  Clopidogrel Bisulfate (PLAVIX) tab 75 mg, 75 mg, Oral, Daily  •  0.9% NaCl infusion, , Intravenous, Continuous  •  Enoxaparin Sodium (LOVENOX) 40 MG/0.4ML injection 40 mg, 40 mg, Subcutaneous, Gavin you for allowing me to participate in the care of your patient.     Sera Flores  9/14/2019  1:01 PM

## 2019-09-14 NOTE — ED INITIAL ASSESSMENT (HPI)
Pt here via EMS after being found on floor by [facility] staff . Pt from [facility] independent living. Pt disoriented, repeatedly asking for mother per EMS.

## 2019-09-14 NOTE — PLAN OF CARE
Problem: CARDIOVASCULAR - ADULT  Goal: Maintains optimal cardiac output and hemodynamic stability  Description  INTERVENTIONS:  - Monitor vital signs, rhythm, and trends  - Monitor for bleeding, hypotension and signs of decreased cardiac output  - Evalua routine/schedule  - Consider collaborating with pharmacy to review patient's medication profile  Outcome: Progressing     Problem: GASTROINTESTINAL - ADULT  Goal: Minimal or absence of nausea and vomiting  Description  INTERVENTIONS:  - Maintain adequate h replacement as ordered  - Monitor response to electrolyte replacements, including rhythm and repeat lab results as appropriate  - Fluid restriction as ordered  - Instruct patient on fluid and nutrition restrictions as appropriate  Outcome: Brenda Paz

## 2019-09-15 NOTE — PROGRESS NOTES
[facility]  Nephrology Progress Note    Dolly Villanueva Attending:  Michael Henderson MD       Assessment and Plan:    1) Hyponatremia- likely due to combination of low solute intake + generous fluid intake + age-related impairment in renal diluting imaging studies reviewed.     Meds:     Current Facility-Administered Medications:  aspirin chewable tab 81 mg 81 mg Oral Daily   atorvastatin (LIPITOR) tab 40 mg 40 mg Oral Nightly   Clopidogrel Bisulfate (PLAVIX) tab 75 mg 75 mg Oral Daily   0.9% NaCl inf

## 2019-09-15 NOTE — PLAN OF CARE
Up sitting in the chair,alert, still  remains w/ confusion, expressive aphasia noted. Denies having pain & no SOB noted. Assisted w/ transfer & ADL needs. Tolerating IV fluids. Noted w/ good urine output from [address]. Kept comfortable,clean & dry.  Fall prec

## 2019-09-15 NOTE — PROGRESS NOTES
ANN HOSPITALIST  Progress Note     Clemente Miguel Patient Status:  Observation    1934 MRN [id number]   [facility] 4NW-A Attending Chico Clayton MD   Hosp Day # 0 PCP Ashlee Singh MD     Chief Complaint: AMS    S: Patient r mg Oral Daily   • enoxaparin  40 mg Subcutaneous Daily   • brimonidine Tartrate  1 drop Both Eyes BID   • cholecalciferol  1,000 Units Oral Daily   • gabapentin  300 mg Oral BID   • Pantoprazole Sodium  20 mg Oral QA AC   • PEG 3350  17 g Oral Daily   • p

## 2019-09-15 NOTE — PROGRESS NOTES
[facility]    Progress Note    Jostin Shawnemmie Villanueva Patient Status:  Observation    1934 MRN [id number]   [facility] 4NW-A Attending Helen Gutierrez MD   Hosp Day # 0 PCP Clearance MD Shayne     Subjective:  Jostin Escobar Ethanmadonna is a(n) packet 17 g 17 g Oral Daily   psyllium (METAMUCIL SMOOTH TEXTURE) 28 % packet 1 packet 1 packet Oral Daily   latanoprost (XALATAN) 0.005 % ophthalmic solution 1 drop 1 drop Both Eyes Nightly   Oxybutynin Chloride (DITROPAN) tab 5 mg 5 mg Oral Nightly

## 2019-09-15 NOTE — PLAN OF CARE
Pt confused at baseline. VSS and afebrile. Pt denies pain. Pt lethargic most of shift but does wake easily. With purewick catheter. No BM overnight. Fall precautions in place. Call light within reach. Will continue to monitor.

## 2019-09-15 NOTE — PHYSICAL THERAPY NOTE
PHYSICAL THERAPY EVALUATION - INPATIENT     Room Number: 426/426-A  Evaluation Date: 9/15/2019  Type of Evaluation: Initial  Physician Order: PT Eval and Treat    Presenting Problem: (AMS)  Reason for Therapy: Mobility Dysfunction and Discharge Plann commands    RANGE OF MOTION AND STRENGTH ASSESSMENT  Upper extremity ROM and strength are within functional limits  Lower extremity ROM is within functional limits  Lower extremity strength is within functional limits  BALANCE  Static Sitting: Fair +  Navya session/findings; All patient questions and concerns addressed; Alarm set    ASSESSMENT   Patient is a 80year old female admitted on 9/13/2019 for toxic metabolic encephalopathy and fall.   Pertinent comorbidities and personal factors impacting therapy inclu

## 2019-09-16 NOTE — DISCHARGE SUMMARY
ANN HOSPITALIST  DISCHARGE SUMMARY     Anna Villanueva Patient Status:  Observation    1934 MRN [id number]   [facility] 4NW-A Attending Ted Tate MD   Hosp Day # 0 PCP Keke Landin MD     Date of Admission: 2019  Date Risk of readmission after discharge from the hospital.    Procedures during hospitalization: none    Consultants: Nephrology, neurology.          Discharge Medications      CHANGE how you take these medications      Instructions Prescription details   claire MG/20ML (25 MG/ML) Soln      11.995 mg/day by Intrathecal route.    Refills:  0     Oxybutynin Chloride 5 MG Tabs  Commonly known as:  DITROPAN      TAKE 1 TABLET BY MOUTH AT BEDTIME   Quantity:  30 tablet  Refills:  5     Pantoprazole Sodium 20 MG Tbec  Co

## 2019-09-16 NOTE — PHYSICAL THERAPY NOTE
PHYSICAL THERAPY TREATMENT NOTE - INPATIENT    Room Number: 426/426-A     Session: 1   Number of Visits to Meet Established Goals: 5    Presenting Problem: (AMS)     Writer called [place] to obtain PLF 9/16/2019     Per RN via phone-     Pt is 1 difficulty does the patient currently have. ..  -   Turning over in bed (including adjusting bedclothes, sheets and blankets)?: A Little   -   Sitting down on and standing up from a chair with arms (e.g., wheelchair, bedside commode, etc.): A Lot   -   Movi light within reach;RN aware of session/findings; All patient questions and concerns addressed; Alarm set    ASSESSMENT   Pt seen this AM for transfers, gait training and BLE strengthening.   Pt with improved participation this session, when compared to PT Bailey Barclay

## 2019-09-16 NOTE — PROGRESS NOTES
ANN HOSPITALIST  Progress Note     Jillian Hough Patient Status:  Observation    1934 MRN [id number]   [facility] 4NW-A Attending Ariane Bloom MD   Hosp Day # 0 PCP Tay Taylor MD     Chief Complaint: AMS    S: Patient u atorvastatin  40 mg Oral Nightly   • Clopidogrel Bisulfate  75 mg Oral Daily   • enoxaparin  40 mg Subcutaneous Daily   • brimonidine Tartrate  1 drop Both Eyes BID   • cholecalciferol  1,000 Units Oral Daily   • gabapentin  300 mg Oral BID   • Pantoprazol

## 2019-09-16 NOTE — PROGRESS NOTES
[facility]  Nephrology Progress Note    Gracie Villanueva Attending:  Alma Mendoza MD       Assessment and Plan:    1) Hyponatremia- likely due to combination of low solute intake + generous fluid intake + age-related impairment in renal diluting mg 40 mg Oral Nightly   Clopidogrel Bisulfate (PLAVIX) tab 75 mg 75 mg Oral Daily   0.9% NaCl infusion  Intravenous Continuous   Enoxaparin Sodium (LOVENOX) 40 MG/0.4ML injection 40 mg 40 mg Subcutaneous Daily   brimonidine Tartrate (ALPHAGAN) 0.2 % opha

## 2019-09-16 NOTE — CM/SW NOTE
09/16/19 1500   CM/SW Referral Data   Referral Source Social Work (self-referral)   Reason for Referral Discharge [address] [facility]

## 2019-09-16 NOTE — PLAN OF CARE
Problem: CARDIOVASCULAR - ADULT  Goal: Maintains optimal cardiac output and hemodynamic stability  Description  INTERVENTIONS:  - Monitor vital signs, rhythm, and trends  - Monitor for bleeding, hypotension and signs of decreased cardiac output  - Evalua profile  Outcome: Progressing     Problem: GENITOURINARY - ADULT  Goal: Absence of urinary retention  Description  INTERVENTIONS:  - Assess patient’s ability to void and empty bladder  - Monitor intake/output and perform bladder scan as needed  - Follow ur and sodium.  Initiate appropriate interventions as ordered  Outcome: Progressing     Problem: Impaired Functional Mobility  Goal: Achieve highest/safest level of mobility/gait  Description  Interventions:  - Assess patient's functional ability and stability

## 2019-09-16 NOTE — OCCUPATIONAL THERAPY NOTE
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 Postoperative changes as described. MAT JUAREZ notified of these findings with preliminary radiology report from [facility] services.        Therapy significant co-morbidities:  COPD, Expressive aphasia    Addit risk    WEIGHT BEARING RESTRICTION  Weight Bearing Restriction: None                PAIN ASSESSMENT  Ratin          COGNITION  Attention Span:  attends with cues to redirect  Orientation Level:  oriented to time and Diffucult to asses due to expressive Impairment: 56.46%  Standardized Score (AM-PAC Scale): 34.69  CMS Modifier (G-Code): CK    FUNCTIONAL TRANSFER ASSESSMENT  Supine to Sit : Moderate assistance  Sit to Stand:  Moderate assistance    Skilled Therapy Provided: Pt received sitting up in arm avani level and would benefit from skilled inpatient OT to address the above deficits, maximizing patient’s ability to return safely to her prior level of function.     Patient Complexity  Occupational Profile/Medical History LOW - Brief history including review

## 2019-09-16 NOTE — PROGRESS NOTES
No events overnite. Sleeping well. Good output. purewick in place. Denies pain. Alert, conversational, cooperative. Afebrile. Vital signs stable.

## 2019-09-17 NOTE — PLAN OF CARE
Assumed care @ 0730. Patient alert, oriented to person. Patient denies discomfort. Patient's vital signs stable.

## 2019-09-17 NOTE — CM/SW NOTE
[facility] will be taking the patient to [facility] today at 2:30pm.  Patient's daughter and facility aware.     Padmini Joseph LCSW

## 2019-09-17 NOTE — CM/SW NOTE
MSW spoke with the patient's daughter Moody Muñoz by phone to inform her of dc today. The patient's daughter would like the patient to return to [facility]. She has arranged for more support at the [facility].   MSW resumed [address] via [place] and arranged for Ed

## 2019-09-17 NOTE — PROGRESS NOTES
ANN HOSPITALIST  Progress Note     Heddie Fuel Patient Status:  Observation    1934 MRN [id number]   [facility] 4NW-A Attending Zohra Bishop MD   Hosp Day # 0 PCP Harriet Lopez MD     Chief Complaint: AMS    S: Patient a • atorvastatin  40 mg Oral Nightly   • Clopidogrel Bisulfate  75 mg Oral Daily   • enoxaparin  40 mg Subcutaneous Daily   • brimonidine Tartrate  1 drop Both Eyes BID   • cholecalciferol  1,000 Units Oral Daily   • gabapentin  300 mg Oral BID   • Pantopr

## 2019-09-17 NOTE — PLAN OF CARE
Pt alert and oriented to self. Pt denies having any pain. Pt forgetful repeating herself and asking the same questions almost immediately after they are answered. Pt is however able to follow commands. Pt with poor appetite and did not eat dinner.  Pt drank stable or improved neurological status  Description  INTERVENTIONS  - Assess for and report changes in neurological status  - Initiate measures to prevent increased intracranial pressure  - Maintain blood pressure and fluid volume within ordered parameters

## 2019-09-17 NOTE — PHYSICAL THERAPY NOTE
PHYSICAL THERAPY TREATMENT NOTE - INPATIENT    Room Number: 426/426-A     Session: 2  Number of Visits to Meet Established Goals: 5    Presenting Problem: (AMS)     Writer called [place] to obtain PLF 9/16/2019     Per RN via phone-     Pt is 1- difficulty does the patient currently have. ..  -   Turning over in bed (including adjusting bedclothes, sheets and blankets)?: A Little   -   Sitting down on and standing up from a chair with arms (e.g., wheelchair, bedside commode, etc.): A Lot   -   Movi chair;Needs met;Call light within reach;RN aware of session/findings; All patient questions and concerns addressed; Alarm set    ASSESSMENT   Pt seen this AM for continued gait training - Pt is motivated to increase ambulation distance.       At this time, Pt

## 2019-09-17 NOTE — CM/SW NOTE
MSW was notified that [facility] could not accommodate the patient's transportation until 5pm.  MSW re-referred the patient to [place] via [place]. Awaiting response.     MED AriasW

## 2019-09-17 NOTE — PROGRESS NOTES
NURSING DISCHARGE NOTE    Discharged Other, (see nursing note) via Ambulance. Accompanied by Support staff  Belongings Taken by patient/family. Patient discharged to [facility].

## 2019-09-17 NOTE — PROGRESS NOTES
[facility]  Nephrology Progress Note    Yesika Villanueva Attending:  Isrrael Staton MD       Assessment and Plan:    1) Hyponatremia- likely due to combination of low solute intake + generous fluid intake + age-related impairment in renal diluting injection 40 mg 40 mg Subcutaneous Daily   brimonidine Tartrate (ALPHAGAN) 0.2 % ophthalmic solution 1 drop 1 drop Both Eyes BID   cholecalciferol (VITAMIN D3) cap/tab 1,000 Units 1,000 Units Oral Daily   gabapentin (NEURONTIN) cap 300 mg 300 mg Oral BID

## 2019-09-23 NOTE — PROGRESS NOTES
HPI:    Kristyn Citiem is a 80year old female here today for hospital follow up.   patient seen in the assisted living  She was discharged from Inpatient hospital, [facility] to Assisted living   Admit Date:  9/13/2019  Discharge Date: 9/17/19  Dis [hydrochlorothiazide] and lactose. Current Meds:    Current Outpatient Medications on File Prior to Visit:  Psyllium 0.52 g Oral Cap Take 2 capsules by mouth daily. Brimonidine Tartrate 0.1 % Ophthalmic Solution 1 drop 2 (two) times daily.    Justyn Abdullahi visit.       HISTORY: reconciled and reviewed with patient  She  has a past medical history of Back pain, Back problem, Cancer ([address] Cataract, Depression, Essential hypertension, Glaucoma, Hearing impairment, High blood pressure, High cholesterol, Inconti to auscultation  CARDIO: RRR without murmur  GI: good BS's, no masses, HSM or tenderness  MUSCULOSKELETAL: back is not tender, FROM of the extremities  EXTREMITIES: no cyanosis, clubbing or edema  NEURO:  Expressive aphasia, cranial nerves are intact, tamara

## 2019-09-24 NOTE — PROGRESS NOTES
[facility] Author: Wesley Hess MD     1934 MRN [id number]   [facility]  Admission 19      Last Hospital Discharge 19 PCP Herbert Smith [facility] of Discharge  [address] [facility]--Patient presents w MEDICATIONS     Current Outpatient Medications:  lisinopril 5 MG Oral Tab Take 1 tablet (5 mg total) by mouth 2 (two) times daily. Disp: 60 tablet Rfl: 0   Psyllium 0.52 g Oral Cap Take 2 capsules by mouth daily.  Disp:  Rfl:    Brimonidine Tartrate 0.1 % O D3) 1000 units Oral Cap Take 1 tablet by mouth daily. Disp:  Rfl:    Polyethylene Glycol 3350 (MIRALAX OR) Take 17 g by mouth daily. Disp:  Rfl:        Review of Systems:   Constitutional: No fevers, chills, fatigue or night sweats.   ENT: No mouth pain, of blood pressures next week    about 25 minutes more than 50% of the time was used for counseling and coordinating care  Deena Mayers MD   [address], [address] [place] [address]    Electronically signed

## 2019-10-03 PROBLEM — I63.9 CVA (CEREBRAL VASCULAR ACCIDENT) (CMD): Status: ACTIVE | Noted: 2019-01-01

## 2019-10-03 PROBLEM — I77.9 CAROTID ARTERY DISEASE (CMD): Status: ACTIVE | Noted: 2019-01-01

## 2019-10-03 PROBLEM — I44.30 AV BLOCK: Status: ACTIVE | Noted: 2019-01-01

## 2019-10-03 PROBLEM — Z95.0 PACEMAKER: Status: ACTIVE | Noted: 2019-01-01

## 2019-10-03 PROBLEM — I10 HTN (HYPERTENSION): Status: ACTIVE | Noted: 2019-01-01

## 2019-10-07 NOTE — TELEPHONE ENCOUNTER
Lab results with Positive UTI    Antibiotics and probiotics sent to [facility] per Dr Raven Cowan written order    [address] [place] [address] and read orders also    Results also faxed to [facility] and sent to scan

## 2019-10-09 NOTE — TELEPHONE ENCOUNTER
VMM from Occupational Therapist states prior to therapy BP was 80/50 and after therapy 80/60. States patient is asymptomatic. Denies dizziness, pain or fatigue. Called nurse at [facility] who states B/P for her was 100/50.   AM Blood Pressure med held

## 2019-10-09 NOTE — TELEPHONE ENCOUNTER
Message left on daughter's VM Dr. Sanders Aase response as stated below. To call back if not using for sleep. Generally well tolerated but would prefer a low dose 25 mg at bedtime if indicated for sleep.

## 2019-10-09 NOTE — TELEPHONE ENCOUNTER
Per Epic review, PCP Dr. Jayson Eid prescribed Trazodone 50mg nightly. LOV 3/26/19 with Dr. Angela Jaeger. Being followed for stroke.

## 2019-10-09 NOTE — PROGRESS NOTES
Humberto Oliva [facility] Author: Boyd Odell MD     1934 MRN [id number]   [facility]  Admission 19      Last Hospital Discharge 19 PCP Herbert Ace of Discharge  [address] [facility]--Patient presents Pt affected the pancreas  Lactose                 DIARRHEA    CURRENT MEDICATIONS     Current Outpatient Medications:  traZODone HCl 50 MG Oral Tab Take 1 tablet (50 mg total) by mouth nightly.  Disp: 20 tablet Rfl: 0   Sulfamethoxazole-TMP -160 MG Or nightly. Disp:  Rfl:    docusate sodium 100 MG Oral Cap Take 100 mg by mouth daily as needed for constipation. Disp:  Rfl:    denosumab 60 MG/ML Subcutaneous Solution Inject 60 mg into the skin every 6 (six) months.    Disp:  Rfl:    Morphine Sulfate Microi visit:    Urinary tract infection without hematuria, site unspecified    Insomnia, unspecified type    Expressive aphasia  Plan--finish the course of Bactrim  Push fluids  Probiotics  I am starting the patient on trazodone 50 mg at bedtime  Limited supply

## 2019-10-14 NOTE — PROGRESS NOTES
I was called by the home health nurse regarding patient's frequent falls  Blood pressure has been low  Systolic blood pressure ranging between 90 and 110  Recently she had blood work checked showing sodium of 123 and potassium of 5.4    Plan at this time i

## 2019-10-15 NOTE — PROGRESS NOTES
Humberto Oliva [facility] Author: Boyd Odell MD     1934 MRN [id number]   [facility]  Admission 19      Last Hospital Discharge 19 PCP Oxana Ace 15 of Discharge  [address] [facility]--Patient presents daily as needed (For blood pressures less than 110). , Disp: 30 tablet, Rfl: 0  traZODone HCl 50 MG Oral Tab, Take 1 tablet (50 mg total) by mouth nightly., Disp: 20 tablet, Rfl: 0  lisinopril 5 MG Oral Tab, Take 1 tablet (5 mg total) by mouth 2 (two) times MG/ML) Injection Solution, 11.995 mg/day by Intrathecal route., Disp: , Rfl:   aspirin 81 MG Oral Tab, Take 81 mg by mouth daily. , Disp: , Rfl:   Cholecalciferol (VITAMIN D3) 1000 units Oral Cap, Take 1 tablet by mouth daily. , Disp: , Rfl:   Polyethylene G sodium remains low I will start her on sodium chloride daily tablets        Harriet Lopez MD   [address] [place] [address]    Electronically signed

## 2019-10-28 NOTE — PROGRESS NOTES
HPI:    Patient ID: Maira Rowland is a 80year old female. HPI    Patient is a 80year old female who presents for follow up along with her daughter for left MCA stroke s/p left CEA follow up.  Daughter reports she was hospitalized twice since I last systems reviewed and are negative.           sodium chloride 1 g Oral Tab, Take 1 tablet (1 g total) by mouth 3 (three) times daily with meals. , Disp: 30 tablet, Rfl: 3  Midodrine HCl 2.5 MG Oral Tab, Take 1 tablet (2.5 mg total) by mouth daily as needed ( Subcutaneous Solution, Inject 60 mg into the skin every 6 (six) months.  , Disp: , Rfl:   Morphine Sulfate Microinfusion 500 MG/20ML (25 MG/ML) Injection Solution, 11.995 mg/day by Intrathecal route., Disp: , Rfl:   aspirin 81 MG Oral Tab, Take 81 mg by mo agree with Midodrine- would benefit from scheduled dosing than prn  Monitor sodium level. Per Dr Catia Gracia note patient allowed to take up to 2 lit of water, encourage adequate fluid and food intake.   Ok to use low dose Trazodone 25 mg for sleep and if needed

## 2019-11-18 PROBLEM — R79.89 ABNORMAL LFTS: Status: RESOLVED | Noted: 2019-01-01 | Resolved: 2019-01-01

## 2019-11-18 PROBLEM — S01.01XA LACERATION OF SCALP, INITIAL ENCOUNTER: Status: RESOLVED | Noted: 2019-01-07 | Resolved: 2019-01-01
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
## 2019-11-18 PROBLEM — R41.82 ALTERED MENTAL STATUS, UNSPECIFIED ALTERED MENTAL STATUS TYPE: Status: RESOLVED | Noted: 2019-01-01 | Resolved: 2019-01-01

## 2019-11-18 PROBLEM — R73.9 HYPERGLYCEMIA: Status: RESOLVED | Noted: 2019-01-07 | Resolved: 2019-01-01

## 2019-11-18 PROBLEM — R41.82 ALTERED MENTAL STATUS: Status: RESOLVED | Noted: 2019-01-01 | Resolved: 2019-01-01

## 2019-11-18 PROBLEM — R55 SYNCOPE AND COLLAPSE: Status: RESOLVED | Noted: 2019-01-07 | Resolved: 2019-01-01

## 2019-11-18 PROBLEM — R79.89 AZOTEMIA: Status: RESOLVED | Noted: 2019-01-07 | Resolved: 2019-01-01

## 2019-11-18 NOTE — PROGRESS NOTES
Herrera Villanueva Author: Miguel A Pierson MD     1934 MRN [id number]   [facility]  Admission 19      Last Hospital Discharge 19 PCP Herbert Uribe [facility] of Discharge  [address] [place] [address]--Patient presents affected the pancreas  Lactose                 DIARRHEA    CURRENT MEDICATIONS   Current Outpatient Medications   Medication Sig Dispense Refill   • B Complex-Biotin-FA (BALANCED B-100) Oral Tab CR TK 1 T PO D  5   • Solifenacin Succinate (VESICARE) 5 MG O 100 MG Oral Cap Take 100 mg by mouth daily as needed for constipation. • denosumab 60 MG/ML Subcutaneous Solution Inject 60 mg into the skin every 6 (six) months.        • Morphine Sulfate Microinfusion 500 MG/20ML (25 MG/ML) Injection Solution 11.995 m reviewed  Continue the other medications unchanged  Other orders  -     Solifenacin Succinate (VESICARE) 5 MG Oral Tab; Take 1 tablet (5 mg total) by mouth daily.             Deena Mayers MD   [address], [address]

## 2019-11-21 NOTE — TELEPHONE ENCOUNTER
LOV 10/15/19    LAST LAB     LAST RX 10/14/19 30 tablet 0 refill    Next OV No future appointments.       PROTOCOL none     Midodrine HCl 2.5 MG Oral Tab              Sig: Take 1 tablet (2.5 mg total) by mouth daily as needed (For blood pressures less than

## 2019-11-21 NOTE — TELEPHONE ENCOUNTER
LOV 10/15/19    LAST LAB none    LAST RX 7/20/19 60 cps 3 refills     Next OV No future appointments.       PROTOCOL none     Name from pharmacy: GABAPENTIN 300MG CAPSULES         Will file in chart as: GABAPENTIN 300 MG Oral Cap         Sig: TAKE ONE CAPSU

## 2019-11-25 NOTE — TELEPHONE ENCOUNTER
lisinopril 5 MG Oral Tab              Sig: Take 1 tablet (5 mg total) by mouth 2 (two) times daily.     Disp:  60 tablet    Refills:  0    Start: 11/23/2019    Class: Normal    Non-formulary    Last ordered: 2 months ago by Miguel A Pierson MD     Hypertension

## 2019-11-29 NOTE — TELEPHONE ENCOUNTER
aspirin 81 MG Oral Tab              Sig: Take 1 tablet (81 mg total) by mouth daily. Disp:  Not specified    Refills:  0    Start: 11/29/2019     LOV 11/12/19 External facility visit     LAST LAB n/a     LAST RX       Next OV No future appointments.

## 2019-12-10 NOTE — PROGRESS NOTES
[facility] Author: Mallorie Davenport MD     1934 MRN [id number]   [facility]  Admission 19      [facility]  [facility]--Patient presents MEDICATIONS   Current Outpatient Medications   Medication Sig Dispense Refill   • sodium chloride 1 g Oral Tab TK 1 T PO ONCE FOR 1 DOSE  0   • gabapentin 300 MG Oral Cap Take 1 capsule (300 mg total) by mouth 2 (two) times daily.  60 capsule 3   • aspirin 4 (four) hours as needed. 0   • Travoprost, BAK Free, (TRAVATAN Z) 0.004 % Ophthalmic Solution Place 1 drop into both eyes nightly. • docusate sodium 100 MG Oral Cap Take 100 mg by mouth daily as needed for constipation.      • Morphine Sulfate Microin time is to check her labs, CBC CMP to look at her sodium and potassium  I would add on Lasix 10 mg 3 times a week for 1 week  I will see her back in a week to check her weight and the swelling    [address] [facility], [address] [place].

## 2019-12-17 NOTE — PROGRESS NOTES
Michelle Villanueva Author: Carisa Marion MD     1934 MRN [id number]   [facility]  Admission 19      Last Hospital Discharge 19 PCP Oxana Cancino 15 of Discharge  [address] [facility]--Patient presents COMMENTS)    Comment:Per  Pt affected the pancreas  Lactose                 DIARRHEA    CURRENT MEDICATIONS   Current Outpatient Medications   Medication Sig Dispense Refill   • spironolactone 25 MG Oral Tab 1 tablet p.o. twice a week, Wednesday and Sunday Ophthalmic Solution Place 1 drop into both eyes nightly. • Travoprost, BAK Free, (TRAVATAN Z) 0.004 % Ophthalmic Solution Place 1 drop into both eyes nightly. • docusate sodium 100 MG Oral Cap Take 100 mg by mouth daily as needed for constipation. diuretics as well as worsening pedal edema and weight gain, risks and benefits discussed with the patient  My plan is to start her on a low dose weekly diuretic  Spironolactone 25 mg p.o. twice a week  We will recheck her labs in 2 weeks  I would follow-up

## 2020-01-01 ENCOUNTER — TELEPHONE (OUTPATIENT)
Dept: FAMILY MEDICINE CLINIC | Facility: CLINIC | Age: 85
End: 2020-01-01

## 2020-01-01 ENCOUNTER — EXTERNAL FACILITY (OUTPATIENT)
Dept: FAMILY MEDICINE CLINIC | Facility: CLINIC | Age: 85
End: 2020-01-01

## 2020-01-01 ENCOUNTER — HOSPITAL ENCOUNTER (EMERGENCY)
Facility: HOSPITAL | Age: 85
Discharge: HOME | End: 2020-01-01
Attending: EMERGENCY MEDICINE
Payer: COMMERCIAL

## 2020-01-01 ENCOUNTER — APPOINTMENT (OUTPATIENT)
Dept: HEMATOLOGY/ONCOLOGY | Facility: HOSPITAL | Age: 85
End: 2020-01-01
Attending: INTERNAL MEDICINE
Payer: MEDICARE

## 2020-01-01 ENCOUNTER — TELEPHONE (OUTPATIENT)
Dept: HEMATOLOGY/ONCOLOGY | Facility: HOSPITAL | Age: 85
End: 2020-01-01

## 2020-01-01 ENCOUNTER — MED REC SCAN ONLY (OUTPATIENT)
Dept: FAMILY MEDICINE CLINIC | Facility: CLINIC | Age: 85
End: 2020-01-01

## 2020-01-01 ENCOUNTER — APPOINTMENT (OUTPATIENT)
Dept: CT IMAGING | Facility: HOSPITAL | Age: 85
End: 2020-01-01
Attending: EMERGENCY MEDICINE
Payer: MEDICARE

## 2020-01-01 ENCOUNTER — HOSPITAL ENCOUNTER (EMERGENCY)
Facility: HOSPITAL | Age: 85
Discharge: HOME | End: 2020-01-01
Attending: EMERGENCY MEDICINE
Payer: MEDICARE

## 2020-01-01 ENCOUNTER — TELEPHONE (OUTPATIENT)
Dept: CARDIOLOGY | Age: 85
End: 2020-01-01

## 2020-01-01 VITALS
TEMPERATURE: 98 F | HEART RATE: 96 BPM | SYSTOLIC BLOOD PRESSURE: 147 MMHG | OXYGEN SATURATION: 99 % | DIASTOLIC BLOOD PRESSURE: 79 MMHG | RESPIRATION RATE: 18 BRPM

## 2020-01-01 VITALS
OXYGEN SATURATION: 97 % | HEART RATE: 89 BPM | SYSTOLIC BLOOD PRESSURE: 144 MMHG | DIASTOLIC BLOOD PRESSURE: 87 MMHG | BODY MASS INDEX: 24 KG/M2 | TEMPERATURE: 99 F | WEIGHT: 138.88 LBS | RESPIRATION RATE: 21 BRPM

## 2020-01-01 VITALS
DIASTOLIC BLOOD PRESSURE: 72 MMHG | SYSTOLIC BLOOD PRESSURE: 142 MMHG | TEMPERATURE: 98 F | HEART RATE: 72 BPM | RESPIRATION RATE: 16 BRPM | OXYGEN SATURATION: 97 %

## 2020-01-01 VITALS
OXYGEN SATURATION: 95 % | DIASTOLIC BLOOD PRESSURE: 60 MMHG | SYSTOLIC BLOOD PRESSURE: 140 MMHG | HEART RATE: 60 BPM | TEMPERATURE: 99 F | BODY MASS INDEX: 22 KG/M2 | WEIGHT: 132 LBS

## 2020-01-01 VITALS
SYSTOLIC BLOOD PRESSURE: 140 MMHG | BODY MASS INDEX: 23.73 KG/M2 | DIASTOLIC BLOOD PRESSURE: 63 MMHG | HEART RATE: 73 BPM | HEIGHT: 63.98 IN | RESPIRATION RATE: 18 BRPM | WEIGHT: 139 LBS | TEMPERATURE: 100 F | OXYGEN SATURATION: 94 %

## 2020-01-01 DIAGNOSIS — K86.89 PANCREATIC MASS: ICD-10-CM

## 2020-01-01 DIAGNOSIS — R60.0 PEDAL EDEMA: ICD-10-CM

## 2020-01-01 DIAGNOSIS — R47.01 EXPRESSIVE APHASIA: ICD-10-CM

## 2020-01-01 DIAGNOSIS — R19.00 RETROPERITONEAL MASS: Primary | ICD-10-CM

## 2020-01-01 DIAGNOSIS — R18.8 OTHER ASCITES: Primary | ICD-10-CM

## 2020-01-01 DIAGNOSIS — R18.0 MALIGNANT ASCITES: ICD-10-CM

## 2020-01-01 DIAGNOSIS — C79.9 MULTIPLE LESIONS OF METASTATIC MALIGNANCY (HCC): Primary | ICD-10-CM

## 2020-01-01 DIAGNOSIS — R18.0 MALIGNANT ASCITES: Primary | ICD-10-CM

## 2020-01-01 DIAGNOSIS — I95.1 ORTHOSTATIC HYPOTENSION: ICD-10-CM

## 2020-01-01 DIAGNOSIS — S09.90XA INJURY OF HEAD, INITIAL ENCOUNTER: ICD-10-CM

## 2020-01-01 DIAGNOSIS — R59.1 DIFFUSE LYMPHADENOPATHY: ICD-10-CM

## 2020-01-01 DIAGNOSIS — R18.0 ASCITES, MALIGNANT: ICD-10-CM
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
## 2020-01-01 LAB
ALBUMIN SERPL-MCNC: 3.7 G/DL (ref 3.4–5)
ALBUMIN/GLOB SERPL: 0.8 {RATIO} (ref 1–2)
ALP LIVER SERPL-CCNC: 195 U/L (ref 55–142)
ALT SERPL-CCNC: 31 U/L (ref 13–56)
ANION GAP SERPL CALC-SCNC: 5 MMOL/L (ref 0–18)
AST SERPL-CCNC: 63 U/L (ref 15–37)
BASOPHILS # BLD AUTO: 0.06 X10(3) UL (ref 0–0.2)
BASOPHILS NFR BLD AUTO: 0.9 %
BILIRUB SERPL-MCNC: 1.1 MG/DL (ref 0.1–2)
BILIRUB UR QL STRIP.AUTO: NEGATIVE
BUN BLD-MCNC: 10 MG/DL (ref 7–18)
BUN/CREAT SERPL: 12.3 (ref 10–20)
CALCIUM BLD-MCNC: 9.8 MG/DL (ref 8.5–10.1)
CHLORIDE SERPL-SCNC: 94 MMOL/L (ref 98–112)
CO2 SERPL-SCNC: 30 MMOL/L (ref 21–32)
CREAT BLD-MCNC: 0.81 MG/DL (ref 0.55–1.02)
DEPRECATED RDW RBC AUTO: 44.7 FL (ref 35.1–46.3)
EOSINOPHIL # BLD AUTO: 0.17 X10(3) UL (ref 0–0.7)
EOSINOPHIL NFR BLD AUTO: 2.6 %
ERYTHROCYTE [DISTWIDTH] IN BLOOD BY AUTOMATED COUNT: 12.7 % (ref 11–15)
GLOBULIN PLAS-MCNC: 4.6 G/DL (ref 2.8–4.4)
GLUCOSE BLD-MCNC: 86 MG/DL (ref 70–99)
GLUCOSE UR STRIP.AUTO-MCNC: NEGATIVE MG/DL
HCT VFR BLD AUTO: 37.4 % (ref 35–48)
HGB BLD-MCNC: 12.2 G/DL (ref 12–16)
IMM GRANULOCYTES # BLD AUTO: 0.02 X10(3) UL (ref 0–1)
IMM GRANULOCYTES NFR BLD: 0.3 %
KETONES UR STRIP.AUTO-MCNC: NEGATIVE MG/DL
LEUKOCYTE ESTERASE UR QL STRIP.AUTO: NEGATIVE
LIPASE SERPL-CCNC: 18 U/L (ref 73–393)
LYMPHOCYTES # BLD AUTO: 1.27 X10(3) UL (ref 1–4)
LYMPHOCYTES NFR BLD AUTO: 19.3 %
M PROTEIN MFR SERPL ELPH: 8.3 G/DL (ref 6.4–8.2)
MCH RBC QN AUTO: 30.8 PG (ref 26–34)
MCHC RBC AUTO-ENTMCNC: 32.6 G/DL (ref 31–37)
MCV RBC AUTO: 94.4 FL (ref 80–100)
MONOCYTES # BLD AUTO: 1.26 X10(3) UL (ref 0.1–1)
MONOCYTES NFR BLD AUTO: 19.1 %
NEUTROPHILS # BLD AUTO: 3.81 X10 (3) UL (ref 1.5–7.7)
NEUTROPHILS # BLD AUTO: 3.81 X10(3) UL (ref 1.5–7.7)
NEUTROPHILS NFR BLD AUTO: 57.8 %
NITRITE UR QL STRIP.AUTO: NEGATIVE
OSMOLALITY SERPL CALC.SUM OF ELEC: 266 MOSM/KG (ref 275–295)
PH UR STRIP.AUTO: 6 [PH] (ref 4.5–8)
PLATELET # BLD AUTO: 161 10(3)UL (ref 150–450)
POTASSIUM SERPL-SCNC: 4.6 MMOL/L (ref 3.5–5.1)
PROT UR STRIP.AUTO-MCNC: NEGATIVE MG/DL
RBC # BLD AUTO: 3.96 X10(6)UL (ref 3.8–5.3)
RBC UR QL AUTO: NEGATIVE
SODIUM SERPL-SCNC: 129 MMOL/L (ref 136–145)
SP GR UR STRIP.AUTO: 1.01 (ref 1–1.03)
UROBILINOGEN UR STRIP.AUTO-MCNC: <2 MG/DL
WBC # BLD AUTO: 6.6 X10(3) UL (ref 4–11)

## 2020-01-01 PROCEDURE — 90471 IMMUNIZATION ADMIN: CPT

## 2020-01-01 PROCEDURE — 99283 EMERGENCY DEPT VISIT LOW MDM: CPT

## 2020-01-01 PROCEDURE — 12001 RPR S/N/AX/GEN/TRNK 2.5CM/<: CPT

## 2020-01-01 PROCEDURE — 99285 EMERGENCY DEPT VISIT HI MDM: CPT

## 2020-01-01 PROCEDURE — 80053 COMPREHEN METABOLIC PANEL: CPT | Performed by: EMERGENCY MEDICINE

## 2020-01-01 PROCEDURE — 83690 ASSAY OF LIPASE: CPT | Performed by: EMERGENCY MEDICINE

## 2020-01-01 PROCEDURE — 99205 OFFICE O/P NEW HI 60 MIN: CPT | Performed by: INTERNAL MEDICINE

## 2020-01-01 PROCEDURE — 96360 HYDRATION IV INFUSION INIT: CPT

## 2020-01-01 PROCEDURE — 99284 EMERGENCY DEPT VISIT MOD MDM: CPT

## 2020-01-01 PROCEDURE — 85025 COMPLETE CBC W/AUTO DIFF WBC: CPT | Performed by: EMERGENCY MEDICINE

## 2020-01-01 PROCEDURE — 81003 URINALYSIS AUTO W/O SCOPE: CPT | Performed by: EMERGENCY MEDICINE

## 2020-01-01 PROCEDURE — 74177 CT ABD & PELVIS W/CONTRAST: CPT | Performed by: EMERGENCY MEDICINE

## 2020-01-01 RX ORDER — MULTIVITAMIN WITH IRON
TABLET ORAL
Qty: 30 TABLET | Refills: 5 | Status: SHIPPED | OUTPATIENT
Start: 2020-01-01

## 2020-01-01 RX ORDER — TETANUS AND DIPHTHERIA TOXOIDS ADSORBED 2; 2 [LF]/.5ML; [LF]/.5ML
0.5 INJECTION INTRAMUSCULAR ONCE
Status: COMPLETED | OUTPATIENT
Start: 2020-01-01 | End: 2020-01-01

## 2020-01-01 RX ORDER — OXYBUTYNIN CHLORIDE 5 MG/1
5 TABLET ORAL 3 TIMES DAILY
Qty: 30 TABLET | Refills: 3 | Status: SHIPPED | OUTPATIENT
Start: 2020-01-01

## 2020-01-01 RX ORDER — SPIRONOLACTONE 25 MG/1
TABLET ORAL
Qty: 10 TABLET | Refills: 0 | Status: SHIPPED | OUTPATIENT
Start: 2020-01-01

## 2020-01-01 RX ORDER — ASPIRIN 81 MG/1
TABLET, COATED ORAL
Qty: 90 TABLET | Refills: 0 | Status: SHIPPED | OUTPATIENT
Start: 2020-01-01

## 2020-01-01 RX ORDER — GABAPENTIN 300 MG/1
CAPSULE ORAL
Qty: 60 CAPSULE | Refills: 0 | Status: SHIPPED | OUTPATIENT
Start: 2020-01-01

## 2020-01-01 RX ORDER — POLYETHYLENE GLYCOL 3350 17 G/17G
17 POWDER, FOR SOLUTION ORAL DAILY
Qty: 90 EACH | Refills: 0 | Status: SHIPPED | OUTPATIENT
Start: 2020-01-01

## 2020-01-01 RX ORDER — POLYETHYLENE GLYCOL 3350 17 G/17G
POWDER, FOR SOLUTION ORAL
COMMUNITY
Start: 2020-01-01

## 2020-01-01 RX ORDER — ASPIRIN 81 MG/1
TABLET, COATED ORAL
Qty: 30 TABLET | Refills: 0 | OUTPATIENT
Start: 2020-01-01

## 2020-01-02 NOTE — TELEPHONE ENCOUNTER
LOV 12/19 We will recheck her labs in 2 weeks  I would follow-up with her in a month    LAST LAB    LAST RX   gabapentin 300 MG Oral Cap 60 capsule 3 12/10/2019    Sig:   Take 1 capsule (300 mg total) by mouth 2 (two) times daily.            Next OV Visit d

## 2020-01-02 NOTE — TELEPHONE ENCOUNTER
[facility] nurse called asking for new Rx for Vitamin B complex per pharmacy and requesting Rx for Miralax. Called pharmacy to check on active refill for Vitamin B complex. They will get ready and send to [facility].   States there is no activ

## 2020-01-21 NOTE — TELEPHONE ENCOUNTER
LOV Visit date not found    LAST LAB     LAST RX 19 10*0    Next OV No future appointments.     PROTOCOL          Si tablet p.o. twice a week, Wednesday and     Disp:  10 tablet    Refills:  0    Start: 2020    Class: Normal    Last ord

## 2020-01-21 NOTE — TELEPHONE ENCOUNTER
LOV Visit date not found    LAST LAB    LAST RX 12-17-19 10 tab    Next OV No future appointments.     PROTOCOL    Name from pharmacy: SPIRONOLACTONE 25MG TABLETS          Will file in chart as: SPIRONOLACTONE 25 MG Oral Tab         Sig: TAKE 1 TABLET BY MO

## 2020-01-21 NOTE — ED PROVIDER NOTES
Patient Seen in: [facility] Emergency Department      History   Patient presents with:  Abdomen/Flank Pain    Stated Complaint: pt denies abd pain.  Pt sent in by nurse who believes pt has worsening ascites    HPI    80-year-old female from a local e Current:/74   Pulse 95   Temp 98 °F (36.7 °C) (Oral)   Resp 18   SpO2 100%         Physical Exam    General appearance: This is an elderly female who is awake and conversive but irritable sitting on a gurney.   Vital signs were reviewed per nurs CBC W/ DIFFERENTIAL[[id number]]          Abnormal            Final result                 Please view results for these tests on the individual orders.           Ct Abdomen Pelvis Iv Contrast, No Oral (er)    Result Date: 1/21/2020  CONCLUSION:  Varinderiv Impression:  Retroperitoneal mass  (primary encounter diagnosis)  Ascites, malignant    Disposition:  Discharge  1/21/2020  9:14 pm    Follow-up:  MD Khushbu Franco. [address] [place] [address]  [phone]

## 2020-01-22 NOTE — TELEPHONE ENCOUNTER
Patient's daughter called and stated that after having tests done at the hospital yesterday, they found out patient has cancer. Would like to speak with dr. Adriana Farias when he has a chance about next steps.

## 2020-01-22 NOTE — TELEPHONE ENCOUNTER
Dr. Nimo Cleary,  [facility]    Patient was sent to ED yesterday:    MDM      #1. 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 2.  Malignant ascites.     The patient's daughter opted for the patient to go back to the extended care facility

## 2020-01-27 NOTE — PROGRESS NOTES
Hematology/Oncology Initial Consultation Note    Patient Name: Mildred Vincent  Medical Record Number: [id number]    YOB: 1934   Date of Consultation: 1/27/2020   PCP: Dr. Eddie Liriano    Reason for Consultation:  Ryann Villanueva was seen Date   • Back pain    • Back problem    • Cancer [facility])    • Cataract    • Depression    • Essential hypertension    • Glaucoma    • Hearing impairment    • High blood pressure    • High cholesterol    • Incontinence    • Lung cancer ([address] 2004    stage 1A 1 tablet (50 mg total) by mouth nightly., Disp: 20 tablet, Rfl: 0  Psyllium 0.52 g Oral Cap, Take 2 capsules by mouth daily. , Disp: , Rfl:   Brimonidine Tartrate 0.1 % Ophthalmic Solution, 1 drop 2 (two) times daily. , Disp: , Rfl:   Pantoprazole Sodium 20 History:  Social History    Patient does not qualify to have social determinant information on file (likely too young). Social History Narrative      ,   about 10 yrs ago. She used to work as a .   Has 2 adult daughters-1 live Skin: no rashes or petechiae    Laboratory:  Lab Results   Component Value Date    WBC 6.6 01/21/2020    WBC 6.1 09/13/2019    WBC 12.8 (H) 08/25/2019    HGB 12.2 01/21/2020    HGB 11.1 (L) 09/13/2019    HGB 10.9 (L) 08/25/2019    HCT 37.4 01/21/2020 Other etiologies such as lymphoma or metastatic adenopathy from a soft YOANA are [facility]   for example are possibilities but pancreatic carcinoma is favored. SPLEEN:  No enlargement or focal lesion. KIDNEYS:  No mass, obstruction, or calcification.     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 This looks chronic.     Impression & Plan:     *Metastatic malignancy  -Based on her exam and imaging findings she almost certainly has a metastatic malignancy involving her retroperito peritoneal drain for repeated drainage if recurs and is uncomfortable. At this time her ascites is somewhat bothersome but not particularly painful or uncomfortable.     Ultimately the patient and her family were leaning towards a less aggressive strategy

## 2020-01-27 NOTE — TELEPHONE ENCOUNTER
Pt's daughter called with update on patient as well as questions, transferred to triage for detailed voicemail.

## 2020-01-27 NOTE — TELEPHONE ENCOUNTER
Patient's daughter left M stating they saw Dr. Pastora Lanier in [facility] and he recommended [facility]. Daughter is working on that. Statyes Dr. Nba Hines had advised compression stockings and patient has been wearing them.  Daughter is asking if they are re

## 2020-01-27 NOTE — TELEPHONE ENCOUNTER
Compression stockings would help to keep her swelling down as well as her blood pressure up,(she has history of very low blood pressures)  In my opinion she should continue wearing that

## 2020-01-27 NOTE — TELEPHONE ENCOUNTER
Called patient's daughter and advised of Dr. Josh Montenegro response regarding compression stockings. States the thigh high stockings have been difficult to find and she is wondering if knee high stockings would be sufficient.     Dr. Denney Primrose, please advise

## 2020-01-28 NOTE — PROGRESS NOTES
[facility]: Judy Santoro MD     1934 MRN [id number]   [facility]  Admission 20      Last Hospital Discharge 20 PCP Oxana Hannon 15 of Discharge  [facility]--Patient presents [facility] stage 1A   • TOTAL HIP REPLACEMENT       No family history on file. Social History    Tobacco Use      Smoking status: Former Smoker        Packs/day: 0.00      Smokeless tobacco: Never Used      Tobacco comment: quit 25 years ago    Alcohol use:  Ramona Morales BISULFATE 75 MG Oral Tab TAKE 1 TABLET BY MOUTH EVERY DAY 90 tablet 3   • atorvastatin 40 MG Oral Tab Take 1 tablet (40 mg total) by mouth nightly. 90 tablet 3   • B Complex Vitamins (VITAMIN B COMPLEX) Oral Tab Take 1 tablet by mouth daily.  90 tablet 3 orders for this visit:    Malignant ascites    Pancreatic mass    Expressive aphasia    Pedal edema        I called the patient's daughter and we discussed hospice, in my opinion patient is appropriate given the advanced nature of the pancreatic tumor  Ref

## 2020-01-28 NOTE — TELEPHONE ENCOUNTER
Daughter called M stating that her mom has made a decision to move forward with hospice care. She wanted doctor to know and had questions on who will be providing care.

## 2020-01-29 NOTE — PROGRESS NOTES
Ramona Villanueva Author: Rafael Figueroa MD     1934 MRN [id number]   [facility]  Admission 20      Last Hospital Discharge 20 PCP Oxana Alvarenga 15 of Discharge  [facility]--Patient presents Medications   Medication Sig Dispense Refill   • Polyethylene Glycol 3350 Oral Powder TK 17 GRAMS MIXED IN LIQUID PO D     • spironolactone 25 MG Oral Tab 1 tablet p.o. twice a week, Wednesday and Sunday 10 tablet 0   • spironolactone 25 MG Oral Tab TAKE 1 (650 mg total) by mouth every 4 (four) hours as needed. 0   • Netarsudil Dimesylate (RHOPRESSA) 0.02 % Ophthalmic Solution Place 1 drop into both eyes nightly.      • Travoprost, BAK Free, (TRAVATAN Z) 0.004 % Ophthalmic Solution Place 1 drop into both eye aphasia    ASSESSMENT AND PLAN:  Diagnoses and all orders for this visit:    Other ascites    Expressive aphasia    Pedal edema    Orthostatic hypotension    I called the patient's daughter regarding the patient's condition which does not look good, she ha

## 2020-01-30 PROBLEM — R59.1 DIFFUSE LYMPHADENOPATHY: Status: ACTIVE | Noted: 2020-01-01

## 2020-01-30 PROBLEM — C79.9 MULTIPLE LESIONS OF METASTATIC MALIGNANCY (HCC): Status: ACTIVE | Noted: 2020-01-01

## 2020-01-30 PROBLEM — R18.0 MALIGNANT ASCITES: Status: ACTIVE | Noted: 2020-01-01

## 2020-01-30 NOTE — TELEPHONE ENCOUNTER
Barb Christy from home hospice called she said she received a RFL for this , but for pancreatic cancer was the DX however they have nothing in there record about this diagnosis , they need something to be sent over with this as the diagnosis in order to compl

## 2020-01-31 NOTE — TELEPHONE ENCOUNTER
Magi calling with [facility] you're faxing documents showing pt has pancreatic cancer to land line. Please fax to [phone].  Thank you Pastor Mendoza

## 2020-01-31 NOTE — TELEPHONE ENCOUNTER
Called Katy with Hospice and informed faxed notes will not go through. States the wrong fax number was given. They already received the necessary documentation from Dr. Hannah Valle office. No need to refax notes.

## 2020-01-31 NOTE — TELEPHONE ENCOUNTER
Notes showing diagnosis of pancreatic cancer faxed to [address] [place] [address] at [phone] as requested.

## 2020-01-31 NOTE — TELEPHONE ENCOUNTER
Daughter of patient called back following her visit earlier this week and states that she and her mother have discussed her options and have decided to pursue hospice care at this time. I discussed this a little further with her.   Her PCP will assist in t

## 2020-02-04 NOTE — TELEPHONE ENCOUNTER
This is not on patients formulary. Solifenacin Succinate (VESICARE) 5 MG Oral Tab 30 tablet 3 11/12/2019    Sig:   Take 1 tablet (5 mg total) by mouth daily. Pended what is.

## 2020-02-11 NOTE — ED NOTES
This rn spoke with rn from [facility] who states pt has orders to discontinue blood work and no hospital admissions due to dx of metastatic pancreatic ca.

## 2020-02-11 NOTE — ED NOTES
This rn spoke with rn from [facility] who states [facility][phone]

## 2020-02-11 NOTE — ED PROVIDER NOTES
Patient Seen in: [facility] Emergency Department      History   Patient presents with:  Trauma    Stated Complaint: fall, head injury    HPI    Patient is an 71-year-old presents to ED after a fall. Patient apparently fell walking to the bathroom. above.    Physical Exam     ED Triage Vitals [02/11/20 0312]   /67   Pulse 90   Resp 24   Temp 98.5 °F (36.9 °C)   Temp src Temporal   SpO2 (!) 88 %   O2 Device None (Room air)       Current:/87   Pulse 89   Temp 98.5 °F (36.9 °C) (Temporal) concerns. Patient felt comfortable going home.           Disposition and Plan     Clinical Impression:  Fall, initial encounter  (primary encounter diagnosis)  Injury of head, initial encounter    Disposition:  Discharge  2/11/2020  4:25 am    Follow-up:

## 2020-02-11 NOTE — ED INITIAL ASSESSMENT (HPI)
Pt presents to ed via ems with head injury following a fall at [address] [place] [address]. On arrival hard cervical collar in place per ems.  Pt answering questions appropriately, denies loc, states mechanical fall, denies dizziness, denies blurred vision, denies n

## 2020-03-09 ENCOUNTER — MED REC SCAN ONLY (OUTPATIENT)
Dept: FAMILY MEDICINE CLINIC | Facility: CLINIC | Age: 85
End: 2020-03-09

## 2020-10-08 ENCOUNTER — APPOINTMENT (OUTPATIENT)
Dept: CARDIOLOGY | Age: 85
End: 2020-10-08
Attending: INTERNAL MEDICINE

## 2021-01-27 DIAGNOSIS — Z23 NEED FOR VACCINATION: ICD-10-CM

## 2023-01-03 NOTE — TELEPHONE ENCOUNTER
Received call from [address] who states patient's B/P was low this am, 89/59, HR 76.  Patient is asymptomatic, denies dizziness, nausea, diaphoresis. Up at usual time, ate breakfast and wants to go to exercise class.       Lisinopril was held th .  62years male alert mental state (AOX3) received on foot.  -Allergy: N/A.  -complain of chest pain.  Hx of asthma, COPD. pt has SOB and chest pain today. pt states " pump did not help my breathing."  -denied dizziness, headache, fever, n/v/d. abdomen pain.  Pt is in the bed comfortably at this time. Will continue to monitor and document any changes.

## 2024-06-10 NOTE — PLAN OF CARE
NURSING DISCHARGE NOTE    Discharged Rehab facility via Ambulance. Accompanied by Support staff  Belongings Taken by patient/family. Discharge packet given to ambulance personnel. Report called to RN at [facility],  All questions answered.  Lisa Mcnair complains of pain/discomfort

## 2025-04-26 NOTE — TELEPHONE ENCOUNTER
Is the patient having trouble sleeping? Generally well tolerated but would prefer a low dose 25 mg at bedtime if indicated for sleep. Alert and oriented to person, place and time/Patient baseline mental status

## (undated) DEVICE — TRANSPOSAL ULTRAFLEX DUO/QUAD ULTRA CART MANIFOLD

## (undated) DEVICE — SOL  .9 500ML

## (undated) DEVICE — DECANTER BAG 9": Brand: MEDLINE INDUSTRIES, INC.

## (undated) DEVICE — TRAY FOLEY 16F IC URINMETER

## (undated) DEVICE — 3M™ STERI-STRIP™ REINFORCED ADHESIVE SKIN CLOSURES, R1547, 1/2 IN X 4 IN (12 MM X 100 MM), 6 STRIPS/ENVELOPE: Brand: 3M™ STERI-STRIP™

## (undated) DEVICE — SUTURE ETHIBOND EXCEL 3-0 SH

## (undated) DEVICE — SKIN AFFIX .4ML

## (undated) DEVICE — 3M(TM) TEGADERM(TM) TRANSPARENT FILM DRESSING FRAME STYLE 1628: Brand: 3M™ TEGADERM™

## (undated) DEVICE — INTENDED TO BE USED TO OCCLUDE, RETRACT AND IDENTIFY ARTERIES, VEINS, TENDONS AND NERVES IN SURGICAL PROCEDURES: Brand: STERION®  VESSEL LOOP

## (undated) DEVICE — HEMOCLIP MED 24 CLIP/CARTRIDGE

## (undated) DEVICE — GAUZE SPONGES,12 PLY: Brand: CURITY

## (undated) DEVICE — MARKER SKIN 2 TIP

## (undated) DEVICE — BARD® JAVID™ CAROTID BYPASS SHUNT, 17F - 10F X 27.5CM: Brand: BARD® JAVID

## (undated) DEVICE — 3M™ IOBAN™ 2 ANTIMICROBIAL INCISE DRAPE 6650EZ: Brand: IOBAN™ 2

## (undated) DEVICE — SUTURE PROLENE 6-0 C-1

## (undated) DEVICE — HEMOCLIP HORIZON SM MULTI

## (undated) DEVICE — SYRINGE 10ML LL TIP

## (undated) DEVICE — 3M(TM) TEGADERM(TM) TRANSPARENT FILM DRESSING FRAME STYLE 9505W: Brand: 3M™ TEGADERM™

## (undated) DEVICE — CATH RED RUBBER 18FR

## (undated) DEVICE — UNDYED BRAIDED (POLYGLACTIN 910), SYNTHETIC ABSORBABLE SUTURE: Brand: COATED VICRYL

## (undated) DEVICE — SUTURE VICRYL 3-0 CT-1

## (undated) DEVICE — HOOK LOCK LATEX FREE ELASTIC BANDAGE 6INX5YD

## (undated) DEVICE — SUTURE PROLENE 7-0 CC

## (undated) DEVICE — PROXIMATE RH ROTATING HEAD SKIN STAPLERS (35 WIDE) CONTAINS 35 STAINLESS STEEL STAPLES: Brand: PROXIMATE

## (undated) DEVICE — CHLORAPREP ORANGE TINT 10.5ML

## (undated) DEVICE — SUTURE VICRYL 2-0 CT-1

## (undated) DEVICE — CV PACK-LF: Brand: MEDLINE INDUSTRIES, INC.

## (undated) DEVICE — GLOVE SURG TRIUMPH SZ 7

## (undated) DEVICE — TOWEL OR BLU 16X26 STRL

## (undated) DEVICE — STANDARD HYPODERMIC NEEDLE,POLYPROPYLENE HUB: Brand: MONOJECT

## (undated) DEVICE — SUTURE POLYDEK 4-0 TIES 6-932

## (undated) DEVICE — SOL  .9 1000ML BTL

## (undated) DEVICE — BASIC DOUBLE BASIN 1-LF: Brand: MEDLINE INDUSTRIES, INC.

## (undated) DEVICE — 3M™ TEGADERM™ TRANSPARENT FILM DRESSING, 1626W, 4 IN X 4-3/4 IN (10 CM X 12 CM), 50 EACH/CARTON, 4 CARTON/CASE: Brand: 3M™ TEGADERM™

## (undated) DEVICE — GLOVE SURG TRIUMPH SZ 71/2

## (undated) DEVICE — MEDI-VAC SUCTION FINE CAPACITY: Brand: CARDINAL HEALTH

## (undated) DEVICE — SUTURE VICRYL 3-0 PS-1

## (undated) NOTE — IP AVS SNAPSHOT
Patient Demographics     Address  [address] [address] Phone  [phone] [facility]  [phone] [facility]      Emergency Contact(s)     Name Relation Home Work Lizbeth Daughter [phone] Albuterol Sulfate  (90 Base) MCG/ACT Aers  Commonly known as:  VENTOLIN HFA      Inhale 2 puffs into the lungs every 4 (four) hours as needed for Wheezing.    Nathen Eason MD         aspirin 81 MG Tabs  Next dose due:  1/28      Take 81 mg by These medications were sent to [address], [address] [place] [address], [phone], [phone]  [address] [address]    Phone:  [phone]   atorvastatin 40 MG Tabs     Please  your p Most Recent Value   Patient Weight  56.8 kg (125 lb 3.5 oz)         Lab Results Last 24 Hours      POTASSIUM [[id number]] (Normal)  Resulted: 01/27/19 0624, Result status: Final result   Ordering provider:  Krishna Gipson MD  01/26/19 2300 Resulting lab Specimen:  Urine, clean catch      Urine Culture >100,000 CFU/ML Staphylococcus aureus, MRSA    Susceptibility      Staphylococcus aureus, MRSA     Not Specified    Cefazolin  Resistant    Gentamicin <=0.5  Sensitive    Levofloxacin >=8  Resistant    Christoph placed last week at our hospital.  Patient was doing well according to the discharge summary. No other information can be obtained at this time due to her mental status    Review of Systems:   A comprehensive 14 point review of systems was completed.     P Brimonidine Tartrate 0.1 % Ophthalmic Solution 1 drop 2 (two) times daily. Disp:  Rfl:    NON FORMULARY Place 0.02 % into both eyes nightly.  Disp:  Rfl:    Netarsudil Dimesylate (RHOPRESSA) 0.02 % Ophthalmic Solution Apply 0.02 % to eye 2 (two) times daily Chest and Back: No tenderness or deformity. Recent incision pacemaker site looks clean. Abdomen: Soft, nontender, nondistended. Positive bowel sounds. No rebound or guarding. Neurologic: Patient follows simple commands and motor wise is intact grossly. 3. HTN- uncontrolled and elevated in ED  1. Monitor for Neuro protocol  4. DL- statin   5. COPD- stable   6. Chronic Pain- on scheduled narcotics  1. Hold medicine temporarily until we get more information on the encephalopathy  7.  Insomnia- hold Restoril • Cataract    • Depression    • Essential hypertension    • Glaucoma    • Hearing impairment    • High blood pressure    • Incontinence    • Muscle weakness    • Pneumonia due to organism    • Thyroid disease    • Visual impairment      Past Surgical Histo •  ondansetron HCl (ZOFRAN) injection 4 mg, 4 mg, Intravenous, Q6H PRN **OR** Metoclopramide HCl (REGLAN) injection 10 mg, 10 mg, Intravenous, Q8H PRN  •  Enoxaparin Sodium (LOVENOX) 40 MG/0.4ML injection 40 mg, 40 mg, Subcutaneous, Nightly  •  Labetalol H Morphine Sulfate Microinfusion 500 MG/20ML (25 MG/ML) Injection Solution 11.995 mg/day by Intrathecal route. Disp:  Rfl:    Omega-3 Fatty Acids (FISH OIL) 1200 MG Oral Cap Take 1,200 mg by mouth daily.    Disp:  Rfl:    aspirin 81 MG Oral Tab Take 81 mg by CREATSERUM  0.61  0.73  0.59   GFRAA  96  87  97   GFRNAA  84  76  84   CA  8.4  8.1*  7.4*   ALB   --   2.6*  2.3*   NA  139  136  139   K  4.0  4.3  3.9   CL  104  104  108   CO2  29.0  27.0  23.0   ALKPHO   --   75  96   AST   --   22  31   ALT   --   1 -can repeat UA with reflex culture today, vancomycin x 1 pending results        Dewain Epley, MD  [address] [place] [address]  [phone][MO.0]    Electronically signed by Pastor Kelly MD on 1/20/2019  1:07 PM   [address]    Constanza Kingston Recent hospital admission due to syncope with PPM    Problem List[KW. 1]  Principal Problem:    Aphasia due to acute cerebrovascular accident (CVA) ([facility] [address]  Active Problems:    Complete heart block (HCC)    Encephalopathy, ISCHEMIC    Stenosis of carotid michelle · Overall Cognitive Status:  Impaired  · Orientation Level:  oriented to person, disoriented to place, disoriented to time and disoriented to situation  · Memory:  impaired working memory and decreased recall of recent events  · Following Commands:  follow Approx Degree of Impairment: 86.62%   Standardized Score (AM-PAC Scale): 28.58   CMS Modifier (G-Code): CM[KW. 2]    FUNCTIONAL ABILITY STATUS  Gait Assessment[KW. 1]   Gait Assistance: Not tested  Distance (ft): 0        Stoop/Curb Assistance: Not tested  C presents with the following impairments decreased strength, decreased balance, gait dysfunction, cognitive deficits including impaired processing, safety, command following, attention. Functional outcome measures completed include AMPAC.  Based on this eval through 1/27/2019 12:20 PM)      Occupational Therapy Note signed by Dixie Velázquez OT at 1/24/2019  3:45 PM  Version 1 of 1    Author:  Dixie Velázquez OT Service:  Shadi Rivas Author Type:  Occupational Therapist    Filed:  1/24/2019  3:45 PM Date of Service:  1/24/201 Complete heart block (HCC)    Encephalopathy, ISCHEMIC    Stenosis of carotid artery    Acute CVA (cerebrovascular accident) ([facility] [address]    Fever    Acute cystitis without hematuria    Atelectasis    Essential hypertension    MRSA infection    Hyponatremia Attention Span:  difficulty attending to directions and difficulty dividing attention  Orientation Level:  oriented to person  Memory:  impaired working memory, decreased recall of recent events and decreased short term memory  Following Commands:   Follows -   Toileting, which includes using toilet, bedpan or urinal? : Total  -   Putting on and taking off regular upper body clothing?: Total  -   Taking care of personal grooming such as brushing teeth?: A Lot  -   Eating meals?: A Little    AM-PAC Score:  Sco The AM-JORGE ' '6-Clicks' Inpatient Daily Activity Short Form was completed and  this patient  is demonstrating a  80% degree impairment in activities of daily living.  This represents a decline from 70% at initial eval.     Continues to be most appropriate f Filed:  1/24/2019 11:50 AM Date of Service:  1/24/2019 11:33 AM Status:  Signed    :  Jeanna Rodgers (Speech and Language Pathologist)       [address]    Admission Date: 1/13/2019  Evalu WAB Bedside Score: moderate expressive/receptive language deficits          Aphasic Depression Rating Scale Administered: Yes  Deficits Identified: Auditory comprehension;Verbal expression(reading and writing TBA)    Discharge Recommendations/Plan: Gina Chandler CONCLUSION:  Increase in the size of infarct. No significant midline shift. No herniation. No hydrocephalus.            Dictated by: Ryder Reid MD on 1/18/2019 at 14:37       Approved by: Ryder Reid MD       Patient/Family Goals: to get better

## (undated) NOTE — ED AVS SNAPSHOT
Marcella Groves   MRN: [id number]    Department:  [facility] Emergency Department   Date of Visit:  6/25/2018           Disclosure     Insurance plans vary and the physician(s) referred by the ER may not be covered by your plan.  Please contact y tell this physician (or your personal doctor if your instructions are to return to your personal doctor) about any new or lasting problems. The primary care or specialist physician will see patients referred from the [facility] Emergency Department.  Cheryle Levins

## (undated) NOTE — IP AVS SNAPSHOT
[address]            (For Outpatient Use Only) Initial Admit Date: 1/13/2019   Inpt/Obs Admit Date: Inpt: 1/13/19 / Obs: N/A   Discharge Date:    [place] Later:  [de-identified]   MRN: [de-identified]   CSN: [id number]        ENCOUNTER  Patient Group Number:  Insurance Type:    Subscriber Name:  Subscriber :    Subscriber ID:  Pt Rel to Subscriber:    Hospital Account Financial Class: Medicare    2019

## (undated) NOTE — LETTER
Ana Webb [address] [facility] [address]  [place], [address] [facility]    Consent for Operation  Date: __________________                                Time: _______________    1. I authorize the performance upon Routt Dura the following operation:    2.  Pr revealed by the pictures or by descriptive texts accompanying them. If the procedure has been videotaped, the surgeon will obtain the original videotape. The hospital will not be responsible for storage or maintenance of this tape.   7. For the purpose of a THAT MY DOCTOR PROVIDED ME WITH THE ABOVE EXPLANATIONS, THAT ALL BLANKS OR STATEMENTS REQUIRING INSERTION OR COMPLETION WERE FILLED IN.     Signature of Patient:   ___________________________    When the patient is a minor or mentally incompetent to give co iii. All of the medicines I take (including prescriptions, herbal supplements, and pills I can buy without a prescription (including street drugs/illegal medications).  Failure to inform my anesthesiologist about these medicines may increase my risk of anes _____________________________________________________________________________  Anesthesiologist Signature     Date   Time  I have discussed the procedure and information above with the patient (or patient’s representative) and answered their questions.  The

## (undated) NOTE — ED AVS SNAPSHOT
Nelda Doyle   MRN: [id number]    Department:  [facility] Emergency Department   Date of Visit:  1/21/2020           Disclosure     Insurance plans vary and the physician(s) referred by the ER may not be covered by your plan.  Please contact y tell this physician (or your personal doctor if your instructions are to return to your personal doctor) about any new or lasting problems. The primary care or specialist physician will see patients referred from the [facility] Emergency Department.  Cheryle Levins

## (undated) NOTE — LETTER
[facility] [address], [address] [facility]    Consent for Anesthesia   1.    Janelle Villanueva agree to be cared for by an anesthesiologist, who is specially trained to monitor me and give me medicine to put me to sleep or keep me comfor vision, nerves, or muscles and in extremely rare instances death. 5. My doctor has explained to me other choices available to me for my care (alternatives).   6. Pregnant Patients (“epidural”):  I understand that the risks of having an epidural (medicine g

## (undated) NOTE — IP AVS SNAPSHOT
[address]            (For Outpatient Use Only) Initial Admit Date: 9/13/2019   Inpt/Obs Admit Date: Inpt: N/A / Obs: 09/14/19   Discharge Date:    Hospital Acct:  [de-identified]   MRN: [de-identified]   CSN: [id number]   CEID: [id number] Subscriber Name:  Ever Landry :    Subscriber ID:  Pt Rel to Subscriber:    Hospital Account Financial Class: Medicare    2019

## (undated) NOTE — ED AVS SNAPSHOT
Angela Zamora   MRN: [id number]    Department:  [facility] Emergency Department   Date of Visit:  2/11/2020           Disclosure     Insurance plans vary and the physician(s) referred by the ER may not be covered by your plan.  Please contact y tell this physician (or your personal doctor if your instructions are to return to your personal doctor) about any new or lasting problems. The primary care or specialist physician will see patients referred from the [facility] Emergency Department.  Kevin Phillips

## (undated) NOTE — LETTER
[facility] [address], [address] [place] [address]    Consent for Anesthesia   1.    Mars Villanueva agree to be cared for by an anesthesiologist, who is specially trained to monitor me and give me medicine to put me to sleep or keep me comfor vision, nerves, or muscles and in extremely rare instances death. 5. My doctor has explained to me other choices available to me for my care (alternatives).   6. Pregnant Patients (“epidural”):  I understand that the risks of having an epidural (medicine g

## (undated) NOTE — IP AVS SNAPSHOT
[address]            (For Outpatient Use Only) Initial Admit Date: 8/23/2019   Inpt/Obs Admit Date: Inpt: N/A / Obs: 08/23/19   Discharge Date:    Hospital Acct:  [de-identified]   MRN: [de-identified]   CSN: [id number]   CEID: [id number] Subscriber Name:  Juan Renae :    Subscriber ID:  Pt Rel to Subscriber:    Hospital Account Financial Class: Medicare    2019

## (undated) NOTE — LETTER
[facility] [address], [address]  Consent for Procedure/Sedation    Date: ***    Time: ***      1. I authorize the performance upon Brendan Leavitt the following:  ***     2. I authorize Dr. Newman Siemens (and whomever is designated as t Witness: _________________________      Date: ___________________    Printed: 2019   3:08 AM  Patient Name: Akiko Brown        : 1934       Medical Record #: [id number]